# Patient Record
Sex: FEMALE | Race: BLACK OR AFRICAN AMERICAN | NOT HISPANIC OR LATINO | Employment: OTHER | URBAN - METROPOLITAN AREA
[De-identification: names, ages, dates, MRNs, and addresses within clinical notes are randomized per-mention and may not be internally consistent; named-entity substitution may affect disease eponyms.]

---

## 2017-06-28 ENCOUNTER — CONSULT (OUTPATIENT)
Dept: SLEEP MEDICINE | Facility: HOSPITAL | Age: 67
End: 2017-06-28

## 2017-06-28 VITALS
WEIGHT: 293 LBS | OXYGEN SATURATION: 93 % | SYSTOLIC BLOOD PRESSURE: 114 MMHG | HEIGHT: 65 IN | HEART RATE: 81 BPM | DIASTOLIC BLOOD PRESSURE: 70 MMHG | BODY MASS INDEX: 48.82 KG/M2

## 2017-06-28 DIAGNOSIS — G47.33 OBSTRUCTIVE SLEEP APNEA, ADULT: Primary | ICD-10-CM

## 2017-06-28 PROBLEM — E66.01 MORBID OBESITY DUE TO EXCESS CALORIES: Status: ACTIVE | Noted: 2017-06-28

## 2017-06-28 PROCEDURE — 99203 OFFICE O/P NEW LOW 30 MIN: CPT | Performed by: INTERNAL MEDICINE

## 2017-06-28 RX ORDER — FLUTICASONE PROPIONATE 50 MCG
SPRAY, SUSPENSION (ML) NASAL DAILY
COMMUNITY
Start: 2017-06-16 | End: 2021-09-24

## 2017-06-28 RX ORDER — LEVOTHYROXINE SODIUM 0.12 MG/1
TABLET ORAL DAILY
COMMUNITY
Start: 2017-03-27

## 2017-06-28 RX ORDER — MELOXICAM 7.5 MG/1
7.5 TABLET ORAL DAILY
COMMUNITY
End: 2018-07-26

## 2017-06-28 RX ORDER — FUROSEMIDE 40 MG/1
40 TABLET ORAL 2 TIMES DAILY
COMMUNITY

## 2017-06-28 RX ORDER — UBIDECARENONE 75 MG
CAPSULE ORAL DAILY
COMMUNITY
Start: 2017-04-01

## 2017-06-28 RX ORDER — GABAPENTIN 300 MG/1
300 CAPSULE ORAL 3 TIMES DAILY
COMMUNITY
End: 2018-07-26

## 2017-06-28 RX ORDER — BUMETANIDE 1 MG/1
TABLET ORAL DAILY
COMMUNITY
Start: 2017-05-23

## 2017-06-28 RX ORDER — PRAVASTATIN SODIUM 40 MG
40 TABLET ORAL DAILY
COMMUNITY
End: 2022-01-25

## 2017-06-28 NOTE — PROGRESS NOTES
Subjective   Romel Wilson is a 67 y.o. female is being seen for consultation today at the request of Lena Bush MD  for the evaluation of snoring and obstructive sleep apnea.    History of Present Illness  Patient has a history of being diagnosed with obstructive sleep apnea.  She been on CPAP but switched insurance this past October November.  Apparently her [employer] company picked up her CPAP machine because her current insurance was not paying for it.  She is referred now for further evaluation.  The patient states she's had snoring noted for at least 10 or 12 years.  She thinks she was diagnosed first in 2014 been on treatment since then.  She really had a last study in March 2016.  It showed severe obstructive sleep apnea with an elevated periodic limb movement index.    Patient states she will fall asleep if sitting quietly during the day.  She is often is sleeping sitting up since she no longer has her machine.  She history of loud snoring she is awakened with a dry mouth and gasping for breath.  She has awakened choking.  She has trouble breathing through her nose but denies ever breaking her nose.  She has morning headaches 3 days per week.  She denies any history of reflux symptoms she denies sleep paralysis.  She occasionally has auditory hallucinations when going to sleep.  She occasionally has kicking and jerking of her legs noted at night.  She that she is gained 10 pounds in the past year.    She goes to bed about 11:30 at night will fall asleep within 20 minutes.  She awakens 2-3 times during the night.  She gets about 6 hours of sleep but does not feel rested.  She denies any history of hypertension or diabetes.  She does have a history of thyroid problems.  She had a previous thyroidectomy.  She has history of arthritis.  No Known Allergies to medication she does have seasonal environmental allergies.       Current Outpatient Prescriptions:   •  bumetanide (BUMEX) 1 MG tablet, , Disp: , Rfl:   •   furosemide (LASIX) 40 MG tablet, Take 40 mg by mouth 2 (Two) Times a Day., Disp: , Rfl:   •  gabapentin (NEURONTIN) 300 MG capsule, Take 300 mg by mouth 3 (Three) Times a Day., Disp: , Rfl:   •  levothyroxine (SYNTHROID, LEVOTHROID) 125 MCG tablet, , Disp: , Rfl:   •  meloxicam (MOBIC) 7.5 MG tablet, Take 7.5 mg by mouth Daily., Disp: , Rfl:   •  pravastatin (PRAVACHOL) 40 MG tablet, Take 40 mg by mouth Daily., Disp: , Rfl:   •  vitamin B-12 (CYANOCOBALAMIN) 100 MCG tablet, , Disp: , Rfl:   •  fluticasone (FLONASE) 50 MCG/ACT nasal spray, , Disp: , Rfl:     Smoking status: Current Some Day Smoker                                                    Packs/day: 1.00      Years: 48.00      Smokeless status: Never Used                           History   Alcohol Use No     Comment: once a year       Caffeine: She has 1 cup of coffee per day in 3 yifan per week    Past Medical History:   Diagnosis Date   • Arthritis    • Disease of thyroid gland        Past Surgical History:   Procedure Laterality Date   • HYSTERECTOMY     • THYROIDECTOMY         Family History   Problem Relation Age of Onset   • Hypertension Mother        The following portions of the patient's history were reviewed and updated as appropriate: allergies, current medications, past family history, past medical history, past social history, past surgical history and problem list.    Review of Systems   Constitutional: Positive for fatigue and unexpected weight change.   HENT: Negative.    Eyes: Negative.    Respiratory: Positive for cough and shortness of breath.    Cardiovascular: Positive for leg swelling.   Gastrointestinal: Negative.    Endocrine: Positive for cold intolerance and heat intolerance.   Genitourinary: Negative.    Musculoskeletal: Positive for arthralgias, back pain, gait problem, joint swelling and myalgias.   Skin: Negative.    Allergic/Immunologic: Positive for environmental allergies.   Neurological: Negative for headaches.  "  Hematological: Negative.    Psychiatric/Behavioral: Negative.     [place] scores 13/24    Objective     /70  Pulse 81  Ht 65\" (165.1 cm)  Wt (!) 322 lb (146 kg)  SpO2 93%  BMI 53.58 kg/m2     Physical Exam   Constitutional: She is oriented to person, place, and time. She appears well-developed and well-nourished.   She is morbidly obese.   HENT:   Head: Normocephalic and atraumatic.   She has nasal airway narrowing and Mallampati class III anatomy   Eyes: EOM are normal. Pupils are equal, round, and reactive to light.   Neck: Normal range of motion. Neck supple.   Cardiovascular: Normal rate, regular rhythm and normal heart sounds.    Pulmonary/Chest: Effort normal and breath sounds normal.   Abdominal: Soft. Bowel sounds are normal.   Musculoskeletal: Normal range of motion. She exhibits edema.   She has trace pedal edema   Neurological: She is alert and oriented to person, place, and time.   Skin: Skin is warm and dry.   Psychiatric: She has a normal mood and affect. Her behavior is normal.    patient's sleep study at [facility] March 2016 showed an AHI of 30.7.  She had oxygen desaturation to 78% and spent 4.35% of her time in bed in desaturated state.  She had an elevated periodic limb movement arousal index of 15.9.      Assessment/Plan   Romel was seen today for sleeping problem.    Diagnoses and all orders for this visit:    Obstructive sleep apnea, adult  -     Detailed AutoPAP Order     periodic limb movement disorder    Patient.  She does have severe obstructive sleep apnea.  We've discussed possible therapies including CPAP weight loss oral appliances and surgery.  We will order CPAP with an AutoSet of 8 cm minimum an 18 cm maximum.  We will see her back in 2 months and be certain were correcting her respiratory events.  We may need to consider medical therapy for periodic limb movements if it seems be bothering her although she is already on gabapentin which may well be be effective.  " She is encouraged to lose weight.  She is encouraged practice lateral position sleep.  She is encouraged to avoid alcohol and sedatives close to bedtime.         Clyde Mccollum MD [facility]  Sleep Medicine  Pulmonary and Critical Care Medicine

## 2017-06-28 NOTE — PATIENT INSTRUCTIONS
Sleep Apnea  Sleep apnea is a condition in which breathing pauses or becomes shallow during sleep. Episodes of sleep apnea usually last 10 seconds or longer, and they may occur as many as 20 times an hour. Sleep apnea disrupts your sleep and keeps your body from getting the rest that it needs. This condition can increase your risk of certain health problems, including:  · Heart attack.  · Stroke.  · Obesity.  · Diabetes.  · Heart failure.  · Irregular heartbeat.  There are three kinds of sleep apnea:  · Obstructive sleep apnea. This kind is caused by a blocked or collapsed airway.  · Central sleep apnea. This kind happens when the part of the brain that controls breathing does not send the correct signals to the muscles that control breathing.  · Mixed sleep apnea. This is a combination of obstructive and central sleep apnea.  CAUSES  The most common cause of this condition is a collapsed or blocked airway. An airway can collapse or become blocked if:  · Your throat muscles are abnormally relaxed.  · Your tongue and tonsils are larger than normal.  · You are overweight.  · Your airway is smaller than normal.  RISK FACTORS  This condition is more likely to develop in people who:  · Are overweight.  · Smoke.  · Have a smaller than normal airway.  · Are elderly.  · Are male.  · Drink alcohol.  · Take sedatives or tranquilizers.  · Have a family history of sleep apnea.  SYMPTOMS  Symptoms of this condition include:  · Trouble staying asleep.  · Daytime sleepiness and tiredness.  · Irritability.  · Loud snoring.  · Morning headaches.  · Trouble concentrating.  · Forgetfulness.  · Decreased interest in sex.  · Unexplained sleepiness.  · Mood swings.  · Personality changes.  · Feelings of depression.  · Waking up often during the night to urinate.  · Dry mouth.  · Sore throat.  DIAGNOSIS  This condition may be diagnosed with:  · A medical history.  · A physical exam.  · A series of tests that are done while you are  sleeping (sleep study). These tests are usually done in a sleep lab, but they may also be done at home.  TREATMENT  Treatment for this condition aims to restore normal breathing and to ease symptoms during sleep. It may involve managing health issues that can affect breathing, such as high blood pressure or obesity. Treatment may include:  · Sleeping on your side.  · Using a decongestant if you have nasal congestion.  · Avoiding the use of depressants, including alcohol, sedatives, and narcotics.  · Losing weight if you are overweight.  · Making changes to your diet.  · Quitting smoking.  · Using a device to open your airway while you sleep, such as:    An oral appliance. This is a custom-made mouthpiece that shifts your lower jaw forward.    A continuous positive airway pressure (CPAP) device. This device delivers oxygen to your airway through a mask.    A nasal expiratory positive airway pressure (EPAP) device. This device has valves that you put into each nostril.    A bi-level positive airway pressure (BPAP) device. This device delivers oxygen to your airway through a mask.  · Surgery if other treatments do not work. During surgery, excess tissue is removed to create a wider airway.  It is important to get treatment for sleep apnea. Without treatment, this condition can lead to:  · High blood pressure.  · Coronary artery disease.  · (Men) An inability to achieve or maintain an erection (impotence).  · Reduced thinking abilities.  HOME CARE INSTRUCTIONS  · Make any lifestyle changes that your health care provider recommends.  · Eat a healthy, well-balanced diet.  · Take over-the-counter and prescription medicines only as told by your health care provider.  · Avoid using depressants, including alcohol, sedatives, and narcotics.  · Take steps to lose weight if you are overweight.  · If you were given a device to open your airway while you sleep, use it only as told by your health care provider.  · Do not use any  tobacco products, such as cigarettes, chewing tobacco, and e-cigarettes. If you need help quitting, ask your health care provider.  · Keep all follow-up visits as told by your health care provider. This is important.  SEEK MEDICAL CARE IF:  · The device that you received to open your airway during sleep is uncomfortable or does not seem to be working.  · Your symptoms do not improve.  · Your symptoms get worse.  SEEK IMMEDIATE MEDICAL CARE IF:  · You develop chest pain.  · You develop shortness of breath.  · You develop discomfort in your back, arms, or stomach.  · You have trouble speaking.  · You have weakness on one side of your body.  · You have drooping in your face.  These symptoms may represent a serious problem that is an emergency. Do not wait to see if the symptoms will go away. Get medical help right away. Call your local emergency services (911 in the [place] Do not drive yourself to the hospital.     This information is not intended to replace advice given to you by your health care provider. Make sure you discuss any questions you have with your health care provider.     Document Released: 12/08/2003 Document Revised: 04/10/2017 Document Reviewed: 09/26/2016  [employer] Interactive Patient Education ©2017 [employer]

## 2017-08-07 ENCOUNTER — TELEPHONE (OUTPATIENT)
Dept: SLEEP MEDICINE | Facility: HOSPITAL | Age: 67
End: 2017-08-07

## 2017-08-07 NOTE — TELEPHONE ENCOUNTER
Called patient to re schedule her compliance follow up. It needs to be rescheduled 31-90 days after 8/4/2017

## 2017-09-29 ENCOUNTER — OFFICE VISIT (OUTPATIENT)
Dept: SLEEP MEDICINE | Facility: HOSPITAL | Age: 67
End: 2017-09-29

## 2017-09-29 VITALS
HEART RATE: 66 BPM | HEIGHT: 65 IN | BODY MASS INDEX: 48.82 KG/M2 | OXYGEN SATURATION: 97 % | WEIGHT: 293 LBS | SYSTOLIC BLOOD PRESSURE: 130 MMHG | DIASTOLIC BLOOD PRESSURE: 64 MMHG

## 2017-09-29 DIAGNOSIS — G47.33 OBSTRUCTIVE SLEEP APNEA, ADULT: Primary | ICD-10-CM

## 2017-09-29 PROCEDURE — 99213 OFFICE O/P EST LOW 20 MIN: CPT | Performed by: INTERNAL MEDICINE

## 2017-09-29 NOTE — PROGRESS NOTES
Subjective   Romel Wilson is a 67 y.o. female is here today for follow-up.  For severe obstructive sleep apnea and periodic limb and disorder.  Her primary care physician is Dr. Lena Bush    History of Present Illness  Patient was last seen in this clinic June 28, 2017.  She had severe obstructive sleep apnea seen on a previous sleep study with an AHI of 30.7.  Her periodic limb movement arousal index was also 15.9.  She is been on gabapentin.  She has a history of morbid obesity and hypothyroidism.  She was placed on new CPAP machine last she's doing well with that.  She says sometimes she sleeps better than others but thinks may be sleeping somewhat better overall is less tired during the day.  She says her gabapentin has been increased in her legs seem to be bothering her less.  Past Medical History:   Diagnosis Date   • Arthritis    • Disease of thyroid gland        Past Surgical History:   Procedure Laterality Date   • HYSTERECTOMY     • THYROIDECTOMY             Current Outpatient Prescriptions:   •  bumetanide (BUMEX) 1 MG tablet, , Disp: , Rfl:   •  fluticasone (FLONASE) 50 MCG/ACT nasal spray, , Disp: , Rfl:   •  furosemide (LASIX) 40 MG tablet, Take 40 mg by mouth 2 (Two) Times a Day., Disp: , Rfl:   •  gabapentin (NEURONTIN) 300 MG capsule, Take 300 mg by mouth 3 (Three) Times a Day., Disp: , Rfl:   •  levothyroxine (SYNTHROID, LEVOTHROID) 125 MCG tablet, , Disp: , Rfl:   •  meloxicam (MOBIC) 7.5 MG tablet, Take 7.5 mg by mouth Daily., Disp: , Rfl:   •  pravastatin (PRAVACHOL) 40 MG tablet, Take 40 mg by mouth Daily., Disp: , Rfl:   •  vitamin B-12 (CYANOCOBALAMIN) 100 MCG tablet, , Disp: , Rfl:     No Known Allergies    The following portions of the patient's history were reviewed and updated as appropriate: allergies, current medications and problem list.    Review of Systems   Constitutional: Negative.    HENT: Positive for congestion and postnasal drip.    Eyes: Negative.    Respiratory: Positive  "for cough and wheezing.    Cardiovascular: Negative.    Gastrointestinal: Negative.    Endocrine: Negative.    Genitourinary: Negative.    Musculoskeletal: Positive for arthralgias, back pain and joint swelling.   Skin: Positive for rash.   Allergic/Immunologic: Negative.    Neurological: Positive for headaches.   Hematological: Negative.    Psychiatric/Behavioral: Negative.    [place] scores 11/24    Objective     /64  Pulse 66  Ht 65\" (165.1 cm)  Wt (!) 328 lb (149 kg)  SpO2 97%  BMI 54.58 kg/m2    Physical Exam   Constitutional: She is oriented to person, place, and time. She appears well-developed and well-nourished.   She is morbidly obese.   HENT:   Head: Normocephalic and atraumatic.   She has Mallampati class IV anatomy   Eyes: EOM are normal. Pupils are equal, round, and reactive to light.   Neck: Normal range of motion. Neck supple.   Cardiovascular: Normal rate, regular rhythm and normal heart sounds.    Pulmonary/Chest: Effort normal and breath sounds normal.   Abdominal: Soft. Bowel sounds are normal.   Musculoskeletal: Normal range of motion. She exhibits edema.   She has trace pedal edema   Neurological: She is alert and oriented to person, place, and time.   Skin: Skin is warm and dry.   Psychiatric: She has a normal mood and affect. Her behavior is normal.    download from her machine for the past 55 days shows usage 98.2% the days.  She's using it 6 hours 54 minutes per day.  Her 90% pressure is 10.3.  Her AHI is normal at 4.7.      Assessment/Plan   Romel was seen today for follow-up.    Diagnoses and all orders for this visit:    Obstructive sleep apnea, adult  -     CPAP Therapy    Patient does have severe obstructive sleep apnea.  She has good control of her respiratory events on her current pressure settings.  She wishes to continue with the CPAP.  We will renew her supplies.  She is encouraged to lose weight.  She is encouraged to avoid alcohol and sedatives close to bedtime.  She " is encouraged practice lateral position sleep.  We will plan to see her back in 1 year.  She is to contact us earlier symptoms worsen             Clyde Mccollum MD [facility]  Sleep Medicine  Pulmonary and Critical Care Medicine      09/29/17  8:51 AM

## 2017-09-29 NOTE — PATIENT INSTRUCTIONS
Sleep Apnea  Sleep apnea is a condition in which breathing pauses or becomes shallow during sleep. Episodes of sleep apnea usually last 10 seconds or longer, and they may occur as many as 20 times an hour. Sleep apnea disrupts your sleep and keeps your body from getting the rest that it needs. This condition can increase your risk of certain health problems, including:  · Heart attack.  · Stroke.  · Obesity.  · Diabetes.  · Heart failure.  · Irregular heartbeat.  There are three kinds of sleep apnea:  · Obstructive sleep apnea. This kind is caused by a blocked or collapsed airway.  · Central sleep apnea. This kind happens when the part of the brain that controls breathing does not send the correct signals to the muscles that control breathing.  · Mixed sleep apnea. This is a combination of obstructive and central sleep apnea.  CAUSES  The most common cause of this condition is a collapsed or blocked airway. An airway can collapse or become blocked if:  · Your throat muscles are abnormally relaxed.  · Your tongue and tonsils are larger than normal.  · You are overweight.  · Your airway is smaller than normal.  RISK FACTORS  This condition is more likely to develop in people who:  · Are overweight.  · Smoke.  · Have a smaller than normal airway.  · Are elderly.  · Are male.  · Drink alcohol.  · Take sedatives or tranquilizers.  · Have a family history of sleep apnea.  SYMPTOMS  Symptoms of this condition include:  · Trouble staying asleep.  · Daytime sleepiness and tiredness.  · Irritability.  · Loud snoring.  · Morning headaches.  · Trouble concentrating.  · Forgetfulness.  · Decreased interest in sex.  · Unexplained sleepiness.  · Mood swings.  · Personality changes.  · Feelings of depression.  · Waking up often during the night to urinate.  · Dry mouth.  · Sore throat.  DIAGNOSIS  This condition may be diagnosed with:  · A medical history.  · A physical exam.  · A series of tests that are done while you are  sleeping (sleep study). These tests are usually done in a sleep lab, but they may also be done at home.  TREATMENT  Treatment for this condition aims to restore normal breathing and to ease symptoms during sleep. It may involve managing health issues that can affect breathing, such as high blood pressure or obesity. Treatment may include:  · Sleeping on your side.  · Using a decongestant if you have nasal congestion.  · Avoiding the use of depressants, including alcohol, sedatives, and narcotics.  · Losing weight if you are overweight.  · Making changes to your diet.  · Quitting smoking.  · Using a device to open your airway while you sleep, such as:    An oral appliance. This is a custom-made mouthpiece that shifts your lower jaw forward.    A continuous positive airway pressure (CPAP) device. This device delivers oxygen to your airway through a mask.    A nasal expiratory positive airway pressure (EPAP) device. This device has valves that you put into each nostril.    A bi-level positive airway pressure (BPAP) device. This device delivers oxygen to your airway through a mask.  · Surgery if other treatments do not work. During surgery, excess tissue is removed to create a wider airway.  It is important to get treatment for sleep apnea. Without treatment, this condition can lead to:  · High blood pressure.  · Coronary artery disease.  · (Men) An inability to achieve or maintain an erection (impotence).  · Reduced thinking abilities.  HOME CARE INSTRUCTIONS  · Make any lifestyle changes that your health care provider recommends.  · Eat a healthy, well-balanced diet.  · Take over-the-counter and prescription medicines only as told by your health care provider.  · Avoid using depressants, including alcohol, sedatives, and narcotics.  · Take steps to lose weight if you are overweight.  · If you were given a device to open your airway while you sleep, use it only as told by your health care provider.  · Do not use any  tobacco products, such as cigarettes, chewing tobacco, and e-cigarettes. If you need help quitting, ask your health care provider.  · Keep all follow-up visits as told by your health care provider. This is important.  SEEK MEDICAL CARE IF:  · The device that you received to open your airway during sleep is uncomfortable or does not seem to be working.  · Your symptoms do not improve.  · Your symptoms get worse.  SEEK IMMEDIATE MEDICAL CARE IF:  · You develop chest pain.  · You develop shortness of breath.  · You develop discomfort in your back, arms, or stomach.  · You have trouble speaking.  · You have weakness on one side of your body.  · You have drooping in your face.  These symptoms may represent a serious problem that is an emergency. Do not wait to see if the symptoms will go away. Get medical help right away. Call your local emergency services (911 in the [place] Do not drive yourself to the hospital.     This information is not intended to replace advice given to you by your health care provider. Make sure you discuss any questions you have with your health care provider.     Document Released: 12/08/2003 Document Revised: 04/10/2017 Document Reviewed: 09/26/2016  [employer] Interactive Patient Education ©2017 [employer]

## 2018-07-26 ENCOUNTER — OFFICE VISIT (OUTPATIENT)
Dept: SLEEP MEDICINE | Facility: HOSPITAL | Age: 68
End: 2018-07-26

## 2018-07-26 VITALS
HEART RATE: 61 BPM | WEIGHT: 293 LBS | HEIGHT: 65 IN | BODY MASS INDEX: 48.82 KG/M2 | DIASTOLIC BLOOD PRESSURE: 63 MMHG | OXYGEN SATURATION: 92 % | SYSTOLIC BLOOD PRESSURE: 105 MMHG

## 2018-07-26 DIAGNOSIS — E66.01 MORBID OBESITY DUE TO EXCESS CALORIES (HCC): ICD-10-CM

## 2018-07-26 DIAGNOSIS — G47.33 OSA (OBSTRUCTIVE SLEEP APNEA): Primary | ICD-10-CM

## 2018-07-26 PROCEDURE — 99213 OFFICE O/P EST LOW 20 MIN: CPT | Performed by: NURSE PRACTITIONER

## 2018-07-26 NOTE — PROGRESS NOTES
Subjective: Follow-up        Chief Complaint:   Chief Complaint   Patient presents with   • Follow-up       HPI:    Romel Wilson is a 68 y.o. female here for follow-up of alan  Patient [place]  Score shows 19/24 states she is still very sleepy during the day and does like to take a nap intermittently she does feel refreshed after awakening.  She has not had any success with weight loss.  When she wakes up in the night she finds that her mask is in the bed she has taken it off during the night and does not know that she did so.  She has also had some problems with the mask leaking.      Current medications are:   Current Outpatient Prescriptions:   •  bumetanide (BUMEX) 1 MG tablet, , Disp: , Rfl:   •  fluticasone (FLONASE) 50 MCG/ACT nasal spray, , Disp: , Rfl:   •  furosemide (LASIX) 40 MG tablet, Take 40 mg by mouth 2 (Two) Times a Day., Disp: , Rfl:   •  levothyroxine (SYNTHROID, LEVOTHROID) 125 MCG tablet, , Disp: , Rfl:   •  pravastatin (PRAVACHOL) 40 MG tablet, Take 40 mg by mouth Daily., Disp: , Rfl:   •  vitamin B-12 (CYANOCOBALAMIN) 100 MCG tablet, , Disp: , Rfl: .      The patient's relevant past medical, surgical, family and social history were reviewed and updated in Epic as appropriate.       Review of Systems   Constitutional: Positive for fatigue.   Respiratory: Positive for apnea and shortness of breath.    Cardiovascular: Positive for chest pain and leg swelling.   Allergic/Immunologic: Positive for environmental allergies.   Psychiatric/Behavioral: Positive for sleep disturbance. The patient is nervous/anxious.    All other systems reviewed and are negative.        Objective:    Physical Exam   Constitutional: She is oriented to person, place, and time. She appears well-developed and well-nourished.   Obese female   HENT:   Head: Normocephalic and atraumatic.   Mouth/Throat: Oropharynx is clear and moist.   Mallampati stage IV anatomy   Eyes: Conjunctivae are normal.   Neck: Neck supple. No  thyromegaly present.   Cardiovascular: Normal rate.    Pulmonary/Chest: Effort normal and breath sounds normal.   Musculoskeletal:   Ambulating with rolling walker   Lymphadenopathy:     She has no cervical adenopathy.   Neurological: She is alert and oriented to person, place, and time.   Skin: Skin is warm and dry.   Psychiatric: She has a normal mood and affect. Her behavior is normal. Judgment and thought content normal.   Nursing note and vitals reviewed.    Usage greater than 4 hours 76.7%.  Greater than 90% of the time pressure 10.8.  AHI is 4.8.    ASSESSMENT/PLAN    Romel was seen today for follow-up.    Diagnoses and all orders for this visit:    NANCY (obstructive sleep apnea)  -     CPAP Therapy    Morbid obesity due to excess calories (CMS/[facility])            1. Counseled patient regarding multimodal approach with healthy nutrition, healthy sleep, regular physical activity, social activities, counseling, and medications. Encouraged to practice lateral sleep position. Avoid alcohol and sedatives close to bedtime.  2. Refill supplies.  3. She is going to her [employer] company to be refitted for another mask.  I feel this is a good idea and she will possibility of a better fit with a different mask.  Return to clinic if she does not found that she is at least 90% better.  Otherwise we will see her in 6 months to 1 year.    I have reviewed the results of my evaluation and impression and discussed my recommendations in detail with the patient.      Signed by  KATELYNN Caraballo    July 27, 2018      CC: Lena Bush MD          No ref. provider found

## 2018-07-26 NOTE — PATIENT INSTRUCTIONS
Obesity, Adult  Obesity is the condition of having too much total body fat. Being overweight or obese means that your weight is greater than what is considered healthy for your body size. Obesity is determined by a measurement called BMI. BMI is an estimate of body fat and is calculated from height and weight. For adults, a BMI of 30 or higher is considered obese.  Obesity can eventually lead to other health concerns and major illnesses, including:  · Stroke.  · Coronary artery disease (CAD).  · Type 2 diabetes.  · Some types of cancer, including cancers of the colon, breast, uterus, and gallbladder.  · Osteoarthritis.  · High blood pressure (hypertension).  · High cholesterol.  · Sleep apnea.  · Gallbladder stones.  · Infertility problems.    What are the causes?  The main cause of obesity is taking in (consuming) more calories than your body uses for energy. Other factors that contribute to this condition may include:  · Being born with genes that make you more likely to become obese.  · Having a medical condition that causes obesity. These conditions include:  ? Hypothyroidism.  ? Polycystic ovarian syndrome (PCOS).  ? Binge-eating disorder.  ? Cushing syndrome.  · Taking certain medicines, such as steroids, antidepressants, and seizure medicines.  · Not being physically active (sedentary lifestyle).  · Living where there are limited places to exercise safely or buy healthy foods.  · Not getting enough sleep.    What increases the risk?  The following factors may increase your risk of this condition:  · Having a family history of obesity.  · Being a woman of -American descent.  · Being a man of  descent.    What are the signs or symptoms?  Having excessive body fat is the main symptom of this condition.  How is this diagnosed?  This condition may be diagnosed based on:  · Your symptoms.  · Your medical history.  · A physical exam. Your health care provider may measure:  ? Your BMI. If you are an  adult with a BMI between 25 and less than 30, you are considered overweight. If you are an adult with a BMI of 30 or higher, you are considered obese.  ? The distances around your hips and your waist (circumferences). These may be compared to each other to help diagnose your condition.  ? Your skinfold thickness. Your health care provider may gently pinch a fold of your skin and measure it.    How is this treated?  Treatment for this condition often includes changing your lifestyle. Treatment may include some or all of the following:  · Dietary changes. Work with your health care provider and a dietitian to set a weight-loss goal that is healthy and reasonable for you. Dietary changes may include eating:  ? Smaller portions. A portion size is the amount of a particular food that is healthy for you to eat at one time. This varies from person to person.  ? Low-calorie or low-fat options.  ? More whole grains, fruits, and vegetables.  · Regular physical activity. This may include aerobic activity (cardio) and strength training.  · Medicine to help you lose weight. Your health care provider may prescribe medicine if you are unable to lose 1 pound a week after 6 weeks of eating more healthily and doing more physical activity.  · Surgery. Surgical options may include gastric banding and gastric bypass. Surgery may be done if:  ? Other treatments have not helped to improve your condition.  ? You have a BMI of 40 or higher.  ? You have life-threatening health problems related to obesity.    Follow these instructions at home:    Eating and drinking    · Follow recommendations from your health care provider about what you eat and drink. Your health care provider may advise you to:  ? Limit fast foods, sweets, and processed snack foods.  ? Choose low-fat options, such as low-fat milk instead of whole milk.  ? Eat 5 or more servings of fruits or vegetables every day.  ? Eat at home more often. This gives you more control over  what you eat.  ? Choose healthy foods when you eat out.  ? Learn what a healthy portion size is.  ? Keep low-fat snacks on hand.  ? Avoid sugary drinks, such as soda, fruit juice, iced tea sweetened with sugar, and flavored milk.  ? Eat a healthy breakfast.  · Drink enough water to keep your urine clear or pale yellow.  · Do not go without eating for long periods of time (do not fast) or follow a fad diet. Fasting and fad diets can be unhealthy and even dangerous.  Physical Activity  · Exercise regularly, as told by your health care provider. Ask your health care provider what types of exercise are safe for you and how often you should exercise.  · Warm up and stretch before being active.  · Cool down and stretch after being active.  · Rest between periods of activity.  Lifestyle  · Limit the time that you spend in front of your TV, computer, or video game system.  · Find ways to reward yourself that do not involve food.  · Limit alcohol intake to no more than 1 drink a day for nonpregnant women and 2 drinks a day for men. One drink equals 12 oz of beer, 5 oz of wine, or 1½ oz of hard liquor.  General instructions  · Keep a weight loss journal to keep track of the food you eat and how much you exercise you get.  · Take over-the-counter and prescription medicines only as told by your health care provider.  · Take vitamins and supplements only as told by your health care provider.  · Consider joining a support group. Your health care provider may be able to recommend a support group.  · Keep all follow-up visits as told by your health care provider. This is important.  Contact a health care provider if:  · You are unable to meet your weight loss goal after 6 weeks of dietary and lifestyle changes.  This information is not intended to replace advice given to you by your health care provider. Make sure you discuss any questions you have with your health care provider.  Document Released: 01/25/2006 Document Revised:  05/22/2017 Document Reviewed: 10/05/2016  [employer] Interactive Patient Education © 2018 [employer]  Sleep Apnea  Sleep apnea is a condition that affects breathing. People with sleep apnea have moments during sleep when their breathing pauses briefly or gets shallow. Sleep apnea can cause these symptoms:  · Trouble staying asleep.  · Sleepiness or tiredness during the day.  · Irritability.  · Loud snoring.  · Morning headaches.  · Trouble concentrating.  · Forgetting things.  · Less interest in sex.  · Being sleepy for no reason.  · Mood swings.  · Personality changes.  · Depression.  · Waking up a lot during the night to pee (urinate).  · Dry mouth.  · Sore throat.    Follow these instructions at home:  · Make any changes in your routine that your doctor recommends.  · Eat a healthy, well-balanced diet.  · Take over-the-counter and prescription medicines only as told by your doctor.  · Avoid using alcohol, calming medicines (sedatives), and narcotic medicines.  · Take steps to lose weight if you are overweight.  · If you were given a machine (device) to use while you sleep, use it only as told by your doctor.  · Do not use any tobacco products, such as cigarettes, chewing tobacco, and e-cigarettes. If you need help quitting, ask your doctor.  · Keep all follow-up visits as told by your doctor. This is important.  Contact a doctor if:  · The machine that you were given to use during sleep is uncomfortable or does not seem to be working.  · Your symptoms do not get better.  · Your symptoms get worse.  Get help right away if:  · Your chest hurts.  · You have trouble breathing in enough air (shortness of breath).  · You have an uncomfortable feeling in your back, arms, or stomach.  · You have trouble talking.  · One side of your body feels weak.  · A part of your face is hanging down (drooping).  These symptoms may be an emergency. Do not wait to see if the symptoms will go away. Get medical help right away. Call  your local emergency services (911 in the [place] Do not drive yourself to the hospital.  This information is not intended to replace advice given to you by your health care provider. Make sure you discuss any questions you have with your health care provider.  Document Released: 09/26/2009 Document Revised: 08/13/2017 Document Reviewed: 09/26/2016  [employer] Interactive Patient Education © 2018 [employer]

## 2019-07-29 ENCOUNTER — OFFICE VISIT (OUTPATIENT)
Dept: SLEEP MEDICINE | Facility: HOSPITAL | Age: 69
End: 2019-07-29

## 2019-07-29 VITALS
HEIGHT: 65 IN | BODY MASS INDEX: 48.82 KG/M2 | OXYGEN SATURATION: 91 % | WEIGHT: 293 LBS | HEART RATE: 55 BPM | SYSTOLIC BLOOD PRESSURE: 125 MMHG | DIASTOLIC BLOOD PRESSURE: 71 MMHG

## 2019-07-29 DIAGNOSIS — G47.33 OBSTRUCTIVE SLEEP APNEA, ADULT: Primary | ICD-10-CM

## 2019-07-29 PROCEDURE — 99213 OFFICE O/P EST LOW 20 MIN: CPT | Performed by: NURSE PRACTITIONER

## 2019-07-29 NOTE — PROGRESS NOTES
"    Chief Complaint:   Chief Complaint   Patient presents with   • Follow-up       HPI:    Romel Wilson is a 69 y.o. female here for follow-up of sleep apnea.  Patient was last seen 7/26/2018 for severe obstructive sleep apnea.  On that visit patient did not feel she was doing well.  Patient is sleeping 6 to 7 hours nightly and does not feel refreshed upon awakening.  Patient states she can go back to sleep as soon as she gets up for the morning.  Patient has an [place] score of 16/24.  Patient states \"I feel no different than I ever have.\"  Patient is willing to try an increase setting to see if this will correct her sleepiness as well as her apnea.        Current medications are:   Current Outpatient Medications:   •  bumetanide (BUMEX) 1 MG tablet, , Disp: , Rfl:   •  fluticasone (FLONASE) 50 MCG/ACT nasal spray, , Disp: , Rfl:   •  furosemide (LASIX) 40 MG tablet, Take 40 mg by mouth 2 (Two) Times a Day., Disp: , Rfl:   •  levothyroxine (SYNTHROID, LEVOTHROID) 125 MCG tablet, , Disp: , Rfl:   •  pravastatin (PRAVACHOL) 40 MG tablet, Take 40 mg by mouth Daily., Disp: , Rfl:   •  vitamin B-12 (CYANOCOBALAMIN) 100 MCG tablet, , Disp: , Rfl: .      The patient's relevant past medical, surgical, family and social history were reviewed and updated in Epic as appropriate.       Review of Systems   Constitutional: Positive for fatigue.   Respiratory: Positive for apnea, cough, choking and shortness of breath.    Cardiovascular: Positive for chest pain and leg swelling.   Musculoskeletal: Positive for arthralgias, gait problem, joint swelling and myalgias.   Allergic/Immunologic: Positive for environmental allergies.   Psychiatric/Behavioral: Positive for sleep disturbance. The patient is nervous/anxious.    All other systems reviewed and are negative.        Objective:    Physical Exam   Constitutional: She is oriented to person, place, and time. She appears well-developed and well-nourished.   HENT:   Head: " Normocephalic and atraumatic.   Mouth/Throat: Oropharynx is clear and moist.   Mallampati 4 anatomy   Eyes: Conjunctivae are normal.   Neck: Neck supple. No thyromegaly present.   Cardiovascular: Normal rate and regular rhythm.   Pulmonary/Chest: Effort normal. She has wheezes in the left upper field.   Lymphadenopathy:     She has no cervical adenopathy.   Neurological: She is alert and oriented to person, place, and time.   Skin: Skin is warm and dry.   Psychiatric: She has a normal mood and affect. Her behavior is normal. Judgment and thought content normal.   Nursing note and vitals reviewed.    Greater than 4-hour use 91.7%.  90% pressure 11.2.  AHI of 6.4.  Days of use 177/180.  Download reviewed with patient.  ASSESSMENT/PLAN    Romel was seen today for follow-up.    Diagnoses and all orders for this visit:    Obstructive sleep apnea, adult  -     CPAP Therapy            1. Counseled patient regarding multimodal approach with healthy nutrition, healthy sleep, regular physical activity, social activities, counseling, and medications. Encouraged to practice lateral sleep position. Avoid alcohol and sedatives close to bedtime.  2. Refill supplies x1 year.  Have changed patient's pressure to 11-20 this is up from 8-18 due to 90% pressure, hypersomnolence, and increased AHI.  I will see patient back in 8 weeks.    I have reviewed the results of my evaluation and impression and discussed my recommendations in detail with the patient.      Signed by  KATELYNN Caraballo    July 29, 2019      CC: Lena Bush MD          No ref. provider found

## 2019-09-23 ENCOUNTER — OFFICE VISIT (OUTPATIENT)
Dept: SLEEP MEDICINE | Facility: HOSPITAL | Age: 69
End: 2019-09-23

## 2019-09-23 VITALS
OXYGEN SATURATION: 95 % | BODY MASS INDEX: 48.82 KG/M2 | HEIGHT: 65 IN | DIASTOLIC BLOOD PRESSURE: 54 MMHG | HEART RATE: 56 BPM | WEIGHT: 293 LBS | SYSTOLIC BLOOD PRESSURE: 114 MMHG

## 2019-09-23 DIAGNOSIS — G47.33 OBSTRUCTIVE SLEEP APNEA, ADULT: Primary | ICD-10-CM

## 2019-09-23 DIAGNOSIS — G47.30 HYPERSOMNIA WITH SLEEP APNEA: ICD-10-CM

## 2019-09-23 DIAGNOSIS — G47.10 HYPERSOMNIA WITH SLEEP APNEA: ICD-10-CM

## 2019-09-23 PROCEDURE — 99212 OFFICE O/P EST SF 10 MIN: CPT | Performed by: NURSE PRACTITIONER

## 2019-09-23 NOTE — PROGRESS NOTES
Chief Complaint:   Chief Complaint   Patient presents with   • Follow-up       HPI:    Romel Wilson is a 69 y.o. female here for follow-up of sleep apnea.  On patient's last visit 7/29/2019 she was not feeling any better while wearing CPAP and her AHI was mildly elevated.  I did increase her pressures to 11-20.  She is in complete compliance with CPAP and AHI is within normal limits.  Patient does still feel sleepy during the day.  Patient sleeps 5 hours nightly and is still tired upon awakening.  Patient does nap during the day.  Patient has an [place] score of 10/24.  We have discussed medications to help with this feeling of tiredness and patient declines.        Current medications are:   Current Outpatient Medications:   •  bumetanide (BUMEX) 1 MG tablet, , Disp: , Rfl:   •  fluticasone (FLONASE) 50 MCG/ACT nasal spray, , Disp: , Rfl:   •  furosemide (LASIX) 40 MG tablet, Take 40 mg by mouth 2 (Two) Times a Day., Disp: , Rfl:   •  levothyroxine (SYNTHROID, LEVOTHROID) 125 MCG tablet, , Disp: , Rfl:   •  pravastatin (PRAVACHOL) 40 MG tablet, Take 40 mg by mouth Daily., Disp: , Rfl:   •  vitamin B-12 (CYANOCOBALAMIN) 100 MCG tablet, , Disp: , Rfl: .      The patient's relevant past medical, surgical, family and social history were reviewed and updated in Epic as appropriate.       Review of Systems   Constitutional: Positive for fatigue.   Eyes: Positive for visual disturbance.   Respiratory: Positive for apnea, cough and choking.    Cardiovascular: Positive for leg swelling.   Musculoskeletal: Positive for arthralgias, back pain, gait problem, joint swelling and myalgias.   Allergic/Immunologic: Positive for environmental allergies.   Psychiatric/Behavioral: Positive for sleep disturbance. The patient is nervous/anxious.    All other systems reviewed and are negative.        Objective:    Physical Exam   Constitutional: She is oriented to person, place, and time. She appears well-developed and well-nourished.    HENT:   Head: Normocephalic and atraumatic.   Mouth/Throat: Oropharynx is clear and moist.   Class 4 airway   Eyes: Conjunctivae are normal.   Neck: Neck supple. No thyromegaly present.   Cardiovascular: Normal rate and regular rhythm.   Pulmonary/Chest: Effort normal and breath sounds normal.   Lymphadenopathy:     She has no cervical adenopathy.   Neurological: She is alert and oriented to person, place, and time.   Skin: Skin is warm and dry.   Psychiatric: She has a normal mood and affect. Her behavior is normal. Judgment and thought content normal.   Nursing note and vitals reviewed.  60/60 days of use.  Greater than 4-hour use 100%.  90% pressure 12.6.  AHI 4.3.  Download reviewed with patient.      ASSESSMENT/PLAN    Romel was seen today for follow-up.    Diagnoses and all orders for this visit:    Obstructive sleep apnea, adult  -     CPAP Therapy    Hypersomnia with sleep apnea            1. Counseled patient regarding multimodal approach with healthy nutrition, healthy sleep, regular physical activity, social activities, counseling, and medications. Encouraged to practice lateral  sleep position. Avoid alcohol and sedatives close to bedtime.  2. Patient wishes to continue with CPAP therapy.  I have advised patient should she change her mind at any time regarding stimulant therapy she may call the office and let me know.    I have reviewed the results of my evaluation and impression and discussed my recommendations in detail with the patient.      Signed by  KATELYNN Caraballo    September 23, 2019      CC: Lena Bush MD          No ref. provider found

## 2019-10-31 ENCOUNTER — TELEPHONE (OUTPATIENT)
Dept: SLEEP MEDICINE | Facility: HOSPITAL | Age: 69
End: 2019-10-31

## 2019-10-31 DIAGNOSIS — G47.33 OSA (OBSTRUCTIVE SLEEP APNEA): Primary | ICD-10-CM

## 2019-10-31 NOTE — TELEPHONE ENCOUNTER
Patient states that she is waking up with a dry mouth and feels that her pressure is too high. Could her pressure be adjusted? Compliance is in chart

## 2019-10-31 NOTE — TELEPHONE ENCOUNTER
CALLED PATIENT TO ADVISE PRESSURE HAS BEEN LOWERED AND THAT SHE CAN ADJUST HUMIDIFIER LEVEL ON MACHINE. PATIENT VERBALIZED UNDERSTANDING PATIENT IS SCHEDULED FOR FU 10/31/19 TR

## 2019-10-31 NOTE — TELEPHONE ENCOUNTER
She can increase humidity setting to help with dryness. I have lowered pressure, want to see her in 4-5 weeks/

## 2019-12-27 ENCOUNTER — OFFICE VISIT (OUTPATIENT)
Dept: SLEEP MEDICINE | Facility: HOSPITAL | Age: 69
End: 2019-12-27

## 2019-12-27 VITALS
DIASTOLIC BLOOD PRESSURE: 67 MMHG | BODY MASS INDEX: 48.82 KG/M2 | HEART RATE: 78 BPM | HEIGHT: 65 IN | SYSTOLIC BLOOD PRESSURE: 128 MMHG | OXYGEN SATURATION: 91 % | WEIGHT: 293 LBS

## 2019-12-27 DIAGNOSIS — G47.33 OSA (OBSTRUCTIVE SLEEP APNEA): Primary | ICD-10-CM

## 2019-12-27 PROCEDURE — 99212 OFFICE O/P EST SF 10 MIN: CPT | Performed by: NURSE PRACTITIONER

## 2019-12-27 RX ORDER — OMEPRAZOLE 40 MG/1
CAPSULE, DELAYED RELEASE ORAL DAILY
COMMUNITY
Start: 2019-12-06 | End: 2021-09-24

## 2019-12-27 RX ORDER — ROSUVASTATIN CALCIUM 20 MG/1
TABLET, COATED ORAL DAILY
COMMUNITY
Start: 2019-12-06

## 2019-12-27 RX ORDER — NAPROXEN 500 MG/1
TABLET ORAL
COMMUNITY
Start: 2019-10-22 | End: 2020-06-08

## 2019-12-27 NOTE — PROGRESS NOTES
Patient states that the dme has requested a decrease of min pressure back to 8 until they see her in 2 weeks. They believe the pressure is drying her out.

## 2019-12-27 NOTE — PROGRESS NOTES
Chief Complaint:   Chief Complaint   Patient presents with   • Follow-up       HPI:    Romel Wilson is a 69 y.o. female here for follow-up of sleep apnea.  Patient was last seen 9/23/2019.  Patient was still having excessive daytime sleepiness as well as a mild increased AHI.  Patient's settings were increased at that time to 11 to 20 cm H2O.  Patient then did call 10/31/2019 and did want her minimum pressure lowered back to 9 cm H2O.  This was done for patient.  Patient states she is doing well now with CPAP therapy though is having very dry mucous membranes.  Patient has increased humidity setting on her CPAP and also using over-the-counter mouth moisturizers without relief.  Patient does wish to continue CPAP and lieu of these symptoms.  Patient is going to speak again to her DME to see if anything else can be done about the moisture level.        Current medications are:   Current Outpatient Medications:   •  bumetanide (BUMEX) 1 MG tablet, , Disp: , Rfl:   •  fluticasone (FLONASE) 50 MCG/ACT nasal spray, , Disp: , Rfl:   •  furosemide (LASIX) 40 MG tablet, Take 40 mg by mouth 2 (Two) Times a Day., Disp: , Rfl:   •  levothyroxine (SYNTHROID, LEVOTHROID) 125 MCG tablet, , Disp: , Rfl:   •  naproxen (NAPROSYN) 500 MG tablet, , Disp: , Rfl:   •  omeprazole (priLOSEC) 40 MG capsule, , Disp: , Rfl:   •  pravastatin (PRAVACHOL) 40 MG tablet, Take 40 mg by mouth Daily., Disp: , Rfl:   •  rosuvastatin (CRESTOR) 20 MG tablet, , Disp: , Rfl:   •  vitamin B-12 (CYANOCOBALAMIN) 100 MCG tablet, , Disp: , Rfl: .      The patient's relevant past medical, surgical, family and social history were reviewed and updated in Epic as appropriate.       Review of Systems   Constitutional: Positive for fatigue.   HENT:        Dry mouth   Eyes: Positive for visual disturbance.   Respiratory: Positive for apnea and cough.    Cardiovascular: Positive for chest pain and leg swelling.   Gastrointestinal:        Heartburn    Musculoskeletal: Positive for arthralgias, back pain, gait problem, joint swelling and myalgias.   Allergic/Immunologic: Positive for environmental allergies.   Psychiatric/Behavioral: Positive for sleep disturbance. The patient is nervous/anxious.    All other systems reviewed and are negative.        Objective:    Physical Exam   Constitutional: She is oriented to person, place, and time. She appears well-developed and well-nourished.   HENT:   Head: Normocephalic and atraumatic.   Mouth/Throat: Oropharynx is clear and moist.   Class 4 airway   Eyes: Conjunctivae are normal.   Neck: Neck supple.   Cardiovascular: Normal rate and regular rhythm.   Pulmonary/Chest: Effort normal and breath sounds normal.   Neurological: She is alert and oriented to person, place, and time.   Skin: Skin is warm and dry.   Psychiatric: She has a normal mood and affect. Her behavior is normal. Judgment and thought content normal.   Nursing note and vitals reviewed.    56/57 days of use.  Greater than 4-hour use 96.5%.  90% pressure 13.3.  AHI 4.3.  Download reviewed with patient.    ASSESSMENT/PLAN    Romel was seen today for follow-up.    Diagnoses and all orders for this visit:    NANCY (obstructive sleep apnea)            1. Counseled patient regarding multimodal approach with healthy nutrition, healthy sleep, regular physical activity, social activities, counseling, and medications. Encouraged to practice lateral  sleep position. Avoid alcohol and sedatives close to bedtime.  2.   Refill supplies x1 year.  Return to clinic 1 year or sooner symptoms warrant.  I have reviewed the results of my evaluation and impression and discussed my recommendations in detail with the patient.      Signed by  KATELYNN Caraballo    December 27, 2019      CC: Lena Bush MD          No ref. provider found

## 2020-01-15 ENCOUNTER — TELEPHONE (OUTPATIENT)
Dept: SLEEP MEDICINE | Facility: HOSPITAL | Age: 70
End: 2020-01-15

## 2020-01-15 NOTE — TELEPHONE ENCOUNTER
Patient called and states that she has been having a lot of trouble with goulds and wants to know if she could go on and try the mandibular device that you all discussed.

## 2020-01-16 DIAGNOSIS — G47.33 OSA (OBSTRUCTIVE SLEEP APNEA): Primary | ICD-10-CM

## 2020-01-16 NOTE — TELEPHONE ENCOUNTER
SPOKE TO PATIENT AND ADVISED HER THAT I WILL MAIL THE ORDER TO HER SO THAT SHE CAN SCHEDULE AN APPOINTMENT WITH HER DENTIST. PATIENT VERBALIZED UNDERSTANDING AND APPRECIATION.

## 2020-02-04 ENCOUNTER — TELEPHONE (OUTPATIENT)
Dept: SLEEP MEDICINE | Facility: HOSPITAL | Age: 70
End: 2020-02-04

## 2020-02-04 NOTE — TELEPHONE ENCOUNTER
PT STATES SEVERAL ATTEMPTS MADE TO CONTACT [employer] AND NO RESOONSE, PT WISHES TO CHANGE DME COMPANY

## 2020-02-05 NOTE — TELEPHONE ENCOUNTER
SPOKE WITH PATIENT. SHE STATES THAT SHE HAS TRIED TO CONTACT [employer] SEVERAL TIMES OVER THE PAST WEEKS WITH NO RESPONSE. SHE HAS EVEN LEFT A VM FOR THE MANAGER WITH NO RETURN CALL. SHE FEELS THAT HER MACHINE IS NOT WORKING PROPERLY AND WOULD LIKE TO DISCUSS WITH A RESPIRATORY THERAPIST. SHE WOULD LIKE A NEW [employer] COMPANY. FAXED INFORMATION TO [employer].

## 2020-05-04 ENCOUNTER — APPOINTMENT (OUTPATIENT)
Dept: GENERAL RADIOLOGY | Facility: HOSPITAL | Age: 70
End: 2020-05-04

## 2020-05-04 ENCOUNTER — HOSPITAL ENCOUNTER (EMERGENCY)
Facility: HOSPITAL | Age: 70
Discharge: HOME | End: 2020-05-04
Attending: EMERGENCY MEDICINE | Admitting: EMERGENCY MEDICINE

## 2020-05-04 VITALS
HEART RATE: 85 BPM | BODY MASS INDEX: 48.82 KG/M2 | TEMPERATURE: 99.2 F | HEIGHT: 65 IN | SYSTOLIC BLOOD PRESSURE: 124 MMHG | RESPIRATION RATE: 20 BRPM | OXYGEN SATURATION: 89 % | WEIGHT: 293 LBS | DIASTOLIC BLOOD PRESSURE: 69 MMHG

## 2020-05-04 DIAGNOSIS — E79.0 ELEVATED URIC ACID IN BLOOD: ICD-10-CM

## 2020-05-04 DIAGNOSIS — M13.10 MONOARTICULAR ARTHRITIS: Primary | ICD-10-CM

## 2020-05-04 LAB
ALBUMIN SERPL-MCNC: 4 G/DL (ref 3.5–5.2)
ALBUMIN/GLOB SERPL: 1 G/DL
ALP SERPL-CCNC: 92 U/L (ref 39–117)
ALT SERPL W P-5'-P-CCNC: 19 U/L (ref 1–33)
ANION GAP SERPL CALCULATED.3IONS-SCNC: 14 MMOL/L (ref 5–15)
AST SERPL-CCNC: 22 U/L (ref 1–32)
BASOPHILS # BLD AUTO: 0.03 10*3/MM3 (ref 0–0.2)
BASOPHILS NFR BLD AUTO: 0.2 % (ref 0–1.5)
BILIRUB SERPL-MCNC: 0.4 MG/DL (ref 0.2–1.2)
BUN BLD-MCNC: 13 MG/DL (ref 8–23)
BUN/CREAT SERPL: 10.9 (ref 7–25)
CALCIUM SPEC-SCNC: 9.3 MG/DL (ref 8.6–10.5)
CHLORIDE SERPL-SCNC: 92 MMOL/L (ref 98–107)
CO2 SERPL-SCNC: 35 MMOL/L (ref 22–29)
CREAT BLD-MCNC: 1.19 MG/DL (ref 0.57–1)
CRP SERPL-MCNC: 1.12 MG/DL (ref 0–0.5)
DEPRECATED RDW RBC AUTO: 49 FL (ref 37–54)
EOSINOPHIL # BLD AUTO: 0.07 10*3/MM3 (ref 0–0.4)
EOSINOPHIL NFR BLD AUTO: 0.5 % (ref 0.3–6.2)
ERYTHROCYTE [DISTWIDTH] IN BLOOD BY AUTOMATED COUNT: 14.9 % (ref 12.3–15.4)
GFR SERPL CREATININE-BSD FRML MDRD: 54 ML/MIN/1.73
GLOBULIN UR ELPH-MCNC: 4.1 GM/DL
GLUCOSE BLD-MCNC: 108 MG/DL (ref 65–99)
HCT VFR BLD AUTO: 44.4 % (ref 34–46.6)
HGB BLD-MCNC: 14.4 G/DL (ref 12–15.9)
IMM GRANULOCYTES # BLD AUTO: 0.06 10*3/MM3 (ref 0–0.05)
IMM GRANULOCYTES NFR BLD AUTO: 0.4 % (ref 0–0.5)
LYMPHOCYTES # BLD AUTO: 3.18 10*3/MM3 (ref 0.7–3.1)
LYMPHOCYTES NFR BLD AUTO: 20.9 % (ref 19.6–45.3)
MCH RBC QN AUTO: 28.9 PG (ref 26.6–33)
MCHC RBC AUTO-ENTMCNC: 32.4 G/DL (ref 31.5–35.7)
MCV RBC AUTO: 89 FL (ref 79–97)
MONOCYTES # BLD AUTO: 0.98 10*3/MM3 (ref 0.1–0.9)
MONOCYTES NFR BLD AUTO: 6.4 % (ref 5–12)
NEUTROPHILS # BLD AUTO: 10.93 10*3/MM3 (ref 1.7–7)
NEUTROPHILS NFR BLD AUTO: 71.6 % (ref 42.7–76)
NRBC BLD AUTO-RTO: 0 /100 WBC (ref 0–0.2)
PLATELET # BLD AUTO: 324 10*3/MM3 (ref 140–450)
PMV BLD AUTO: 9.7 FL (ref 6–12)
POTASSIUM BLD-SCNC: 2.9 MMOL/L (ref 3.5–5.2)
PROT SERPL-MCNC: 8.1 G/DL (ref 6–8.5)
RBC # BLD AUTO: 4.99 10*6/MM3 (ref 3.77–5.28)
SODIUM BLD-SCNC: 141 MMOL/L (ref 136–145)
URATE SERPL-MCNC: 9.9 MG/DL (ref 2.4–5.7)
WBC NRBC COR # BLD: 15.25 10*3/MM3 (ref 3.4–10.8)

## 2020-05-04 PROCEDURE — 99284 EMERGENCY DEPT VISIT MOD MDM: CPT

## 2020-05-04 PROCEDURE — 85025 COMPLETE CBC W/AUTO DIFF WBC: CPT | Performed by: EMERGENCY MEDICINE

## 2020-05-04 PROCEDURE — 80053 COMPREHEN METABOLIC PANEL: CPT | Performed by: EMERGENCY MEDICINE

## 2020-05-04 PROCEDURE — 84550 ASSAY OF BLOOD/URIC ACID: CPT | Performed by: EMERGENCY MEDICINE

## 2020-05-04 PROCEDURE — 63710000001 PREDNISONE PER 1 MG: Performed by: NURSE PRACTITIONER

## 2020-05-04 PROCEDURE — 73630 X-RAY EXAM OF FOOT: CPT

## 2020-05-04 PROCEDURE — 63710000001 ONDANSETRON PER 8 MG: Performed by: EMERGENCY MEDICINE

## 2020-05-04 PROCEDURE — 86140 C-REACTIVE PROTEIN: CPT | Performed by: EMERGENCY MEDICINE

## 2020-05-04 RX ORDER — CEPHALEXIN 250 MG/1
500 CAPSULE ORAL ONCE
Status: COMPLETED | OUTPATIENT
Start: 2020-05-04 | End: 2020-05-04

## 2020-05-04 RX ORDER — ONDANSETRON 4 MG/1
4 TABLET, FILM COATED ORAL ONCE
Status: COMPLETED | OUTPATIENT
Start: 2020-05-04 | End: 2020-05-04

## 2020-05-04 RX ORDER — PREDNISONE 20 MG/1
60 TABLET ORAL ONCE
Status: COMPLETED | OUTPATIENT
Start: 2020-05-04 | End: 2020-05-04

## 2020-05-04 RX ORDER — HYDROCODONE BITARTRATE AND ACETAMINOPHEN 7.5; 325 MG/1; MG/1
1 TABLET ORAL EVERY 8 HOURS PRN
Qty: 12 TABLET | Refills: 0 | OUTPATIENT
Start: 2020-05-04 | End: 2020-06-08

## 2020-05-04 RX ORDER — METHYLPREDNISOLONE 4 MG/1
TABLET ORAL
Qty: 21 TABLET | Refills: 0 | OUTPATIENT
Start: 2020-05-04 | End: 2020-06-08

## 2020-05-04 RX ORDER — HYDROCODONE BITARTRATE AND ACETAMINOPHEN 7.5; 325 MG/1; MG/1
1 TABLET ORAL ONCE
Status: COMPLETED | OUTPATIENT
Start: 2020-05-04 | End: 2020-05-04

## 2020-05-04 RX ORDER — ONDANSETRON 4 MG/1
4 TABLET, ORALLY DISINTEGRATING ORAL EVERY 8 HOURS PRN
Qty: 14 TABLET | Refills: 0 | OUTPATIENT
Start: 2020-05-04 | End: 2020-06-08

## 2020-05-04 RX ORDER — CEPHALEXIN 500 MG/1
500 CAPSULE ORAL 3 TIMES DAILY
Qty: 30 CAPSULE | Refills: 0 | OUTPATIENT
Start: 2020-05-04 | End: 2020-06-08

## 2020-05-04 RX ADMIN — PREDNISONE 60 MG: 20 TABLET ORAL at 21:51

## 2020-05-04 RX ADMIN — ONDANSETRON HYDROCHLORIDE 4 MG: 4 TABLET, FILM COATED ORAL at 19:34

## 2020-05-04 RX ADMIN — HYDROCODONE BITARTRATE AND ACETAMINOPHEN 1 TABLET: 7.5; 325 TABLET ORAL at 19:34

## 2020-05-04 RX ADMIN — CEPHALEXIN 500 MG: 250 CAPSULE ORAL at 21:51

## 2020-05-05 NOTE — DISCHARGE INSTRUCTIONS
Ends as directed.  Take pain medications with nausea medicine and drink ample clear fluids.  Follow-up with your primary care provider to monitor your recovery.  Thank you

## 2020-05-05 NOTE — ED PROVIDER NOTES
"Subjective   Patient presents to the emergency department with complaint of pain to her right foot which began spontaneously this morning after awakening.  Patient states that she experienced a similar pain in the left foot the week before but it resolved spontaneously.  Patient typically uses a walker.  She denies any recent falls.  She denies any fever or history of gout.  \"I have arthritis\".  Patient localizes her greatest pain in the arch and near the first MTP joint.  Patient has no history of diabetes or hypertension.      Foot Pain   Location:  Right foot on the dorsal portion as well as the distal portion of the arch and MTP joint  Quality:  8  Severity:  Moderate  Onset quality:  Sudden  Duration:  1 day  Timing:  Constant  Progression:  Unchanged  Chronicity:  New  Associated symptoms: no fever        Review of Systems   Constitutional: Negative for fever.   Endocrine: Negative.    Musculoskeletal: Positive for gait problem (painful weight bearing). Negative for back pain.        Pain at the first MTP joint and the dorsal right foot.  No trauma   Allergic/Immunologic: Negative.    Hematological: Does not bruise/bleed easily.   All other systems reviewed and are negative.      Past Medical History:   Diagnosis Date   • Arthritis    • Disease of thyroid gland        No Known Allergies    Past Surgical History:   Procedure Laterality Date   • HYSTERECTOMY     • THYROIDECTOMY         Family History   Problem Relation Age of Onset   • Hypertension Mother        Social History     Socioeconomic History   • Marital status: Single     Spouse name: Not on file   • Number of children: Not on file   • Years of education: Not on file   • Highest education level: Not on file   Tobacco Use   • Smoking status: Current Every Day Smoker     Packs/day: 0.50     Types: Cigarettes   • Smokeless tobacco: Never Used   Substance and Sexual Activity   • Alcohol use: No     Comment: once a year   • Drug use: No           Objective "   Physical Exam   Constitutional: She is oriented to person, place, and time. She appears well-developed and well-nourished. No distress.   HENT:   Head: Normocephalic.   Eyes: Conjunctivae are normal.   Cardiovascular: Normal rate and regular rhythm.   Pulmonary/Chest: Effort normal and breath sounds normal.   Abdominal: Soft. Bowel sounds are normal.   Musculoskeletal:   Right foot: There is no erythema or warmth or soft tissue swelling to the foot.  Skin is in good repair.  Pulses are normal.  Capillary refill is brisk.  Palpation of the first MTP joint seems to be the focus for the pain.  Proximal and distal joints are negative.  Neurovascular exam is negative.  No calf pain.  No hip pain pelvis is stable left foot is unremarkable.   Neurological: She is alert and oriented to person, place, and time.   Skin: Skin is warm and dry. Capillary refill takes less than 2 seconds. No rash noted. She is not diaphoretic. No erythema.   Psychiatric: She has a normal mood and affect. Her behavior is normal. Judgment and thought content normal.   Nursing note and vitals reviewed.      Procedures           ED Course            Recent Results (from the past 24 hour(s))   Comprehensive Metabolic Panel    Collection Time: 05/04/20  7:35 PM   Result Value Ref Range    Glucose 108 (H) 65 - 99 mg/dL    BUN 13 8 - 23 mg/dL    Creatinine 1.19 (H) 0.57 - 1.00 mg/dL    Sodium 141 136 - 145 mmol/L    Potassium 2.9 (L) 3.5 - 5.2 mmol/L    Chloride 92 (L) 98 - 107 mmol/L    CO2 35.0 (H) 22.0 - 29.0 mmol/L    Calcium 9.3 8.6 - 10.5 mg/dL    Total Protein 8.1 6.0 - 8.5 g/dL    Albumin 4.00 3.50 - 5.20 g/dL    ALT (SGPT) 19 1 - 33 U/L    AST (SGOT) 22 1 - 32 U/L    Alkaline Phosphatase 92 39 - 117 U/L    Total Bilirubin 0.4 0.2 - 1.2 mg/dL    eGFR  African Amer 54 (L) >60 mL/min/1.73    Globulin 4.1 gm/dL    A/G Ratio 1.0 g/dL    BUN/Creatinine Ratio 10.9 7.0 - 25.0    Anion Gap 14.0 5.0 - 15.0 mmol/L   Uric Acid    Collection Time: 05/04/20   7:35 PM   Result Value Ref Range    Uric Acid 9.9 (H) 2.4 - 5.7 mg/dL   C-reactive Protein    Collection Time: 05/04/20  7:35 PM   Result Value Ref Range    C-Reactive Protein 1.12 (H) 0.00 - 0.50 mg/dL   CBC Auto Differential    Collection Time: 05/04/20  7:35 PM   Result Value Ref Range    WBC 15.25 (H) 3.40 - 10.80 10*3/mm3    RBC 4.99 3.77 - 5.28 10*6/mm3    Hemoglobin 14.4 12.0 - 15.9 g/dL    Hematocrit 44.4 34.0 - 46.6 %    MCV 89.0 79.0 - 97.0 fL    MCH 28.9 26.6 - 33.0 pg    MCHC 32.4 31.5 - 35.7 g/dL    RDW 14.9 12.3 - 15.4 %    RDW-SD 49.0 37.0 - 54.0 fl    MPV 9.7 6.0 - 12.0 fL    Platelets 324 140 - 450 10*3/mm3    Neutrophil % 71.6 42.7 - 76.0 %    Lymphocyte % 20.9 19.6 - 45.3 %    Monocyte % 6.4 5.0 - 12.0 %    Eosinophil % 0.5 0.3 - 6.2 %    Basophil % 0.2 0.0 - 1.5 %    Immature Grans % 0.4 0.0 - 0.5 %    Neutrophils, Absolute 10.93 (H) 1.70 - 7.00 10*3/mm3    Lymphocytes, Absolute 3.18 (H) 0.70 - 3.10 10*3/mm3    Monocytes, Absolute 0.98 (H) 0.10 - 0.90 10*3/mm3    Eosinophils, Absolute 0.07 0.00 - 0.40 10*3/mm3    Basophils, Absolute 0.03 0.00 - 0.20 10*3/mm3    Immature Grans, Absolute 0.06 (H) 0.00 - 0.05 10*3/mm3    nRBC 0.0 0.0 - 0.2 /100 WBC     Note: In addition to lab results from this visit, the labs listed above may include labs taken at another facility or during a different encounter within the last 24 hours. Please correlate lab times with ED admission and discharge times for further clarification of the services performed during this visit.    XR Foot 3+ View Right   Final Result   Mild soft tissue swelling diffusely. The bones are demineralized. Otherwise no acute abnormality right foot      Signer Name: Kacie Hurtado MD    Signed: 5/4/2020 8:52 PM    Workstation Name: [email or web address]     [facility]        Vitals:    05/04/20 1719 05/04/20 2000 05/04/20 2030 05/04/20 2100   BP: 116/72 119/84 125/80 124/69   BP Location: Right arm      Patient Position: Sitting       Pulse: 87 84 80 85   Resp: 20      Temp:       TempSrc:       SpO2: 93% 93% 93% (!) 89%   Weight:       Height:         Medications   HYDROcodone-acetaminophen (NORCO) 7.5-325 MG per tablet 1 tablet (1 tablet Oral Given 5/4/20 1934)   ondansetron (ZOFRAN) tablet 4 mg (4 mg Oral Given 5/4/20 1934)   cephalexin (KEFLEX) capsule 500 mg (500 mg Oral Given 5/4/20 2151)   predniSONE (DELTASONE) tablet 60 mg (60 mg Oral Given 5/4/20 2151)     ECG/EMG Results (last 24 hours)     ** No results found for the last 24 hours. **        No orders to display                                         MDM    Final diagnoses:   Monoarticular arthritis   Elevated uric acid in blood            Farhana Gaffney, KATELYNN  05/05/20 0102

## 2020-06-08 ENCOUNTER — APPOINTMENT (OUTPATIENT)
Dept: GENERAL RADIOLOGY | Facility: HOSPITAL | Age: 70
End: 2020-06-08

## 2020-06-08 ENCOUNTER — HOSPITAL ENCOUNTER (EMERGENCY)
Facility: HOSPITAL | Age: 70
Discharge: HOME | End: 2020-06-08
Attending: EMERGENCY MEDICINE | Admitting: EMERGENCY MEDICINE

## 2020-06-08 VITALS
DIASTOLIC BLOOD PRESSURE: 55 MMHG | TEMPERATURE: 98.3 F | WEIGHT: 293 LBS | HEIGHT: 65 IN | OXYGEN SATURATION: 94 % | BODY MASS INDEX: 48.82 KG/M2 | RESPIRATION RATE: 20 BRPM | SYSTOLIC BLOOD PRESSURE: 111 MMHG | HEART RATE: 67 BPM

## 2020-06-08 DIAGNOSIS — M79.671 BILATERAL FOOT PAIN: Primary | ICD-10-CM

## 2020-06-08 DIAGNOSIS — M72.2 PLANTAR FASCIITIS, BILATERAL: ICD-10-CM

## 2020-06-08 DIAGNOSIS — M79.672 BILATERAL FOOT PAIN: Primary | ICD-10-CM

## 2020-06-08 LAB
ALBUMIN SERPL-MCNC: 3.8 G/DL (ref 3.5–5.2)
ALBUMIN/GLOB SERPL: 1.1 G/DL
ALP SERPL-CCNC: 84 U/L (ref 39–117)
ALT SERPL W P-5'-P-CCNC: 8 U/L (ref 1–33)
ANION GAP SERPL CALCULATED.3IONS-SCNC: 12 MMOL/L (ref 5–15)
AST SERPL-CCNC: 15 U/L (ref 1–32)
BASOPHILS # BLD AUTO: 0.05 10*3/MM3 (ref 0–0.2)
BASOPHILS NFR BLD AUTO: 0.4 % (ref 0–1.5)
BILIRUB SERPL-MCNC: 0.6 MG/DL (ref 0.2–1.2)
BUN BLD-MCNC: 11 MG/DL (ref 8–23)
BUN/CREAT SERPL: 11.5 (ref 7–25)
CALCIUM SPEC-SCNC: 9.1 MG/DL (ref 8.6–10.5)
CHLORIDE SERPL-SCNC: 97 MMOL/L (ref 98–107)
CO2 SERPL-SCNC: 32 MMOL/L (ref 22–29)
CREAT BLD-MCNC: 0.96 MG/DL (ref 0.57–1)
DEPRECATED RDW RBC AUTO: 49.6 FL (ref 37–54)
EOSINOPHIL # BLD AUTO: 0.05 10*3/MM3 (ref 0–0.4)
EOSINOPHIL NFR BLD AUTO: 0.4 % (ref 0.3–6.2)
ERYTHROCYTE [DISTWIDTH] IN BLOOD BY AUTOMATED COUNT: 14.6 % (ref 12.3–15.4)
GFR SERPL CREATININE-BSD FRML MDRD: 70 ML/MIN/1.73
GLOBULIN UR ELPH-MCNC: 3.6 GM/DL
GLUCOSE BLD-MCNC: 103 MG/DL (ref 65–99)
HCT VFR BLD AUTO: 42.5 % (ref 34–46.6)
HGB BLD-MCNC: 13.5 G/DL (ref 12–15.9)
IMM GRANULOCYTES # BLD AUTO: 0.07 10*3/MM3 (ref 0–0.05)
IMM GRANULOCYTES NFR BLD AUTO: 0.5 % (ref 0–0.5)
LYMPHOCYTES # BLD AUTO: 2.29 10*3/MM3 (ref 0.7–3.1)
LYMPHOCYTES NFR BLD AUTO: 16.8 % (ref 19.6–45.3)
MCH RBC QN AUTO: 29.3 PG (ref 26.6–33)
MCHC RBC AUTO-ENTMCNC: 31.8 G/DL (ref 31.5–35.7)
MCV RBC AUTO: 92.2 FL (ref 79–97)
MONOCYTES # BLD AUTO: 1.1 10*3/MM3 (ref 0.1–0.9)
MONOCYTES NFR BLD AUTO: 8.1 % (ref 5–12)
NEUTROPHILS # BLD AUTO: 10.07 10*3/MM3 (ref 1.7–7)
NEUTROPHILS NFR BLD AUTO: 73.8 % (ref 42.7–76)
NRBC BLD AUTO-RTO: 0 /100 WBC (ref 0–0.2)
PLATELET # BLD AUTO: 263 10*3/MM3 (ref 140–450)
PMV BLD AUTO: 9.4 FL (ref 6–12)
POTASSIUM BLD-SCNC: 3.3 MMOL/L (ref 3.5–5.2)
PROT SERPL-MCNC: 7.4 G/DL (ref 6–8.5)
RBC # BLD AUTO: 4.61 10*6/MM3 (ref 3.77–5.28)
SODIUM BLD-SCNC: 141 MMOL/L (ref 136–145)
WBC NRBC COR # BLD: 13.63 10*3/MM3 (ref 3.4–10.8)

## 2020-06-08 PROCEDURE — 85025 COMPLETE CBC W/AUTO DIFF WBC: CPT | Performed by: EMERGENCY MEDICINE

## 2020-06-08 PROCEDURE — 73630 X-RAY EXAM OF FOOT: CPT

## 2020-06-08 PROCEDURE — 80053 COMPREHEN METABOLIC PANEL: CPT | Performed by: EMERGENCY MEDICINE

## 2020-06-08 PROCEDURE — 99284 EMERGENCY DEPT VISIT MOD MDM: CPT

## 2020-06-08 RX ORDER — MULTIVIT WITH MINERALS/LUTEIN
250 TABLET ORAL DAILY
COMMUNITY

## 2020-06-08 RX ORDER — HYDROCODONE BITARTRATE AND ACETAMINOPHEN 7.5; 325 MG/1; MG/1
1 TABLET ORAL ONCE
Status: COMPLETED | OUTPATIENT
Start: 2020-06-08 | End: 2020-06-08

## 2020-06-08 RX ORDER — MELATONIN
1000 DAILY
COMMUNITY

## 2020-06-08 RX ADMIN — HYDROCODONE BITARTRATE AND ACETAMINOPHEN 1 TABLET: 7.5; 325 TABLET ORAL at 08:51

## 2020-06-08 NOTE — ED PROVIDER NOTES
EMERGENCY DEPARTMENT ENCOUNTER    Room Number:  JAYCOB/JAYCOB  Date of encounter:  6/9/2020  PCP: Lena Bush MD  Historian: Patient      HPI:  Chief Complaint: Bilateral foot pain    A complete HPI/ROS/PMH/PSH/SH/FH are unobtainable due to: Not applicable    Context: Romel Wilson is a 70 y.o. female who presents to the ED c/o bilateral foot pain.  She was seen here about 10 days ago for the same problem mostly in the right leg.  It seems to be in both feet now.  She is run out of the medication they gave her needs more.  She denies any trauma.  She has had no fevers no chills.  Denies any lower leg edema.  She has had no open wounds.  No redness or red streaks.      PAST MEDICAL HISTORY  Active Ambulatory Problems     Diagnosis Date Noted   • Obstructive sleep apnea, adult 06/28/2017   • Morbid obesity due to excess calories (CMS/[facility]) 06/28/2017     Resolved Ambulatory Problems     Diagnosis Date Noted   • No Resolved Ambulatory Problems     Past Medical History:   Diagnosis Date   • Arthritis    • Disease of thyroid gland    • Gout          PAST SURGICAL HISTORY  Past Surgical History:   Procedure Laterality Date   • HYSTERECTOMY     • THYROIDECTOMY           FAMILY HISTORY  Family History   Problem Relation Age of Onset   • Hypertension Mother          SOCIAL HISTORY  Social History     Socioeconomic History   • Marital status: Single     Spouse name: Not on file   • Number of children: Not on file   • Years of education: Not on file   • Highest education level: Not on file   Tobacco Use   • Smoking status: Current Every Day Smoker     Packs/day: 0.50     Types: Cigarettes   • Smokeless tobacco: Never Used   Substance and Sexual Activity   • Alcohol use: No     Comment: once a year   • Drug use: No         ALLERGIES  Patient has no known allergies.        REVIEW OF SYSTEMS  Review of Systems     All systems reviewed and negative except for those discussed in HPI.       PHYSICAL EXAM    I have reviewed the triage  vital signs and nursing notes.    ED Triage Vitals [06/08/20 0813]   Temp Heart Rate Resp BP SpO2   98.3 °F (36.8 °C) 82 20 101/65 99 %      Temp src Heart Rate Source Patient Position BP Location FiO2 (%)   Oral Monitor Sitting Left arm --       Physical Exam  GENERAL: Warm dry nontoxic well developed.  Appears in no acute distress.  HENT: Nares patent  EYES: No scleral icterus  CV: Regular rhythm, regular rate  RESPIRATORY: Normal effort.  No audible wheezes, rales or rhonchi  ABDOMEN: Soft, nontender  MUSCULOSKELETAL: No deformities.  Both feet have onychomycosis.  She is tender across the plantar fascia of both feet.  There is no open wounds no redness no induration.  There is no regional lymphadenitis or lymphadenopathy.  She has no point tenderness at the MP joint or the ankle.  NEURO: Alert, moves all extremities, follows commands  SKIN: Warm, dry, no rash visualized        LAB RESULTS  No results found for this or any previous visit (from the past 24 hour(s)).    Ordered the above labs and independently reviewed the results.        RADIOLOGY  No Radiology Exams Resulted Within Past 24 Hours    I ordered and reviewed the above noted radiographic studies.    I viewed images of x-ray of the foot which showed no acute findings per my independent interpretation.  See radiologist's dictation for official interpretation.        PROCEDURES    Procedures      MEDICATIONS GIVEN IN ER    Medications   HYDROcodone-acetaminophen (NORCO) 7.5-325 MG per tablet 1 tablet (1 tablet Oral Given 6/8/20 0851)         PROGRESS, DATA ANALYSIS, CONSULTS, AND MEDICAL DECISION MAKING    All labs have been independently reviewed by me.  All radiology studies have been reviewed by me and the radiologist dictating the report.   EKG's have been independently viewed and interpreted by me.      Differential diagnoses: Plantar fasciitis, pathological fracture           Discussed the findings with the patient.  Advised her could not write her  for any more narcotics.        AS OF 18:44 VITALS:    BP - 111/55  HR - 67  TEMP - 98.3 °F (36.8 °C) (Oral)  O2 SATS - 94%        DIAGNOSIS  Final diagnoses:   Bilateral foot pain   Plantar fasciitis, bilateral         DISPOSITION  DISCHARGE    Patient discharged in stable condition.    Reviewed implications of results, diagnosis, meds, responsibility to follow up, warning signs and symptoms of possible worsening, potential complications and reasons to return to ER.    Patient/Family voiced understanding of above instructions.    Discussed plan for discharge, as there is no emergent indication for admission.  Pt/family is agreeable and understands need for follow up and possible repeat testing.  Pt/family is aware that discharge does not mean that nothing is wrong but that it indicates no emergency is currently present that requires admission and they must continue care with follow-up as given below or with a physician of their choice.     FOLLOW-UP  Lena Bush MD  [address] [place] [address]  [phone]               Medication List      New Prescriptions    diclofenac 50 MG EC tablet  Commonly known as:  VOLTAREN  Take 1 tablet by mouth 3 (Three) Times a Day.        Stop    cephalexin 500 MG capsule  Commonly known as:  KEFLEX     HYDROcodone-acetaminophen 7.5-325 MG per tablet  Commonly known as:  NORCO     methylPREDNISolone 4 MG tablet  Commonly known as:  MEDROL (BENY)     naproxen 500 MG tablet  Commonly known as:  NAPROSYN     ondansetron ODT 4 MG disintegrating tablet  Commonly known as:  ZOFRAN-ODT                     Clyde Suarez PA  06/09/20 2364

## 2020-08-12 ENCOUNTER — HOSPITAL ENCOUNTER (OUTPATIENT)
Dept: SLEEP MEDICINE | Facility: HOSPITAL | Age: 70
End: 2020-08-12

## 2020-08-12 VITALS
DIASTOLIC BLOOD PRESSURE: 64 MMHG | BODY MASS INDEX: 48.82 KG/M2 | RESPIRATION RATE: 16 BRPM | HEART RATE: 58 BPM | WEIGHT: 293 LBS | TEMPERATURE: 96.9 F | HEIGHT: 65 IN | SYSTOLIC BLOOD PRESSURE: 112 MMHG

## 2020-08-12 DIAGNOSIS — E66.01 MORBID OBESITY DUE TO EXCESS CALORIES (HCC): ICD-10-CM

## 2020-08-12 DIAGNOSIS — G47.33 OSA (OBSTRUCTIVE SLEEP APNEA): ICD-10-CM

## 2020-08-12 DIAGNOSIS — Z71.89 CPAP USE COUNSELING: ICD-10-CM

## 2020-08-12 DIAGNOSIS — Z78.9 DIFFICULTY WITH CPAP USE: Primary | ICD-10-CM

## 2020-08-12 DIAGNOSIS — G47.33 OSA ON CPAP: ICD-10-CM

## 2020-08-12 DIAGNOSIS — G47.33 OBSTRUCTIVE SLEEP APNEA, ADULT: ICD-10-CM

## 2020-08-12 DIAGNOSIS — Z99.89 OSA ON CPAP: ICD-10-CM

## 2020-08-12 DIAGNOSIS — Z99.89 USES CONTINUOUS POSITIVE AIRWAY PRESSURE (CPAP) VENTILATION AT HOME: ICD-10-CM

## 2020-08-12 NOTE — ADDENDUM NOTE
Encounter addended by: Deuce Carlson, RRT, RPSGT, RST, CCSH on: 8/12/2020 4:45 PM   Actions taken: Sign clinical note

## 2020-08-12 NOTE — PROGRESS NOTES
Subjective  Ms. Romel Wilson arrived to her appointment for today’s visit with me via [employer] ride-assistance program.  She is complaining of difficulty with the mask coming off in the middle of the night.  She complains of the mask not covering enough of her nose, she came in on a Respironics Dreamwear Full Face size Medium-wide mask.  She explains it as sliding off and herself being a “wild sleeper”.  She is also complaining of dry mouth and dry nasal cavities.       Objective  Ms. Wilson is a very nice black female in no apparent distress.  She walks with a walker on her own and states she only uses the walker for help balancing occasionally and that she mostly uses it to make her care team happy.    • Height - 65”, Weight - 304 lbs, BMI - , Temperature 96.9 degrees Fahrenheit, Heart-rate is 58 bpm, Blood pressure is 112/64 mmHg with MAP of 79 mmHg.  • Medications were reviewed with her.  • Auto-CPAP download of last 30 days is present.  Many nights show a slightly elevated AHI but the overall AHI based on the last 30 days is 5.6 and 90% pressure is 11.5 cmh2o even though 90% pressure on last several nights had been higher.      Assessment  The following were addressed during her visit  • Proper cleaning procedures - she claimed that she uses Rhona detergent every night.  The equipment is heavily stained from tobacco use.    • Ms. Wilson demonstrated how she dons the CPAP mask.  • She showed me how she has to work-around the break in the machine.  The clasp that holds in the tank is broken.  • We put her on Auto CPAP 10 cmh2o and she was able to tolerate this new pressure setting that should eliminate some of her respiratory events that are happening at night.    • Respironics Cecy View Full Face Mask size large cushion was tried on her.  She was pleased with this mask, from the fit to how the hose connects.  She was educated on its fit and use and the quick-disconnects.  It was effective with no audible leak.     • The heating system on the machine also appears to be broken and she explained it uses hardly any water at all and I verified that the air is not very warm after turning both the base plate and the tubing on maximum settings.  She definitely could benefit from a new machine.    Plan  Ms. Wilson was able to lie on the bed with the minimum CPAP of 10 cmh2o comfortably tolerated on the Respironics Cecy View Full Face Mask size Large cushion.  She breathed on the machine for 15 minutes.  • Recommend an increase in minimum CPAP from 8cmh2o to 10 cmh2o.  • Recommend changing mask to Respironics Cecy View FF size large  • Recommend repair/replacement of Auto CPAP machine as hers is broken and is over 3 years old.    • Have her follow up after getting new machine and hopefully her download will show decreased AHI and potentially raise minimum CPAP up more if she is able to tolerate.

## 2020-08-12 NOTE — PROGRESS NOTES
A total of 55 minutes were spent with Ms. Wilson and myself with equipment fit and settings adjustments.

## 2020-08-14 DIAGNOSIS — G47.33 OSA (OBSTRUCTIVE SLEEP APNEA): Primary | ICD-10-CM

## 2020-08-17 NOTE — ADDENDUM NOTE
Encounter addended by: Tamika Orozco on: 8/17/2020 11:59 AM   Actions taken: Charge Capture section accepted, Visit diagnoses modified

## 2020-09-25 ENCOUNTER — OFFICE VISIT (OUTPATIENT)
Dept: SLEEP MEDICINE | Facility: HOSPITAL | Age: 70
End: 2020-09-25

## 2020-09-25 VITALS
TEMPERATURE: 97.1 F | HEIGHT: 65 IN | OXYGEN SATURATION: 93 % | DIASTOLIC BLOOD PRESSURE: 70 MMHG | RESPIRATION RATE: 20 BRPM | WEIGHT: 293 LBS | HEART RATE: 64 BPM | SYSTOLIC BLOOD PRESSURE: 114 MMHG | BODY MASS INDEX: 48.82 KG/M2

## 2020-09-25 DIAGNOSIS — G47.33 OSA (OBSTRUCTIVE SLEEP APNEA): Primary | ICD-10-CM

## 2020-09-25 PROCEDURE — 99212 OFFICE O/P EST SF 10 MIN: CPT | Performed by: NURSE PRACTITIONER

## 2020-09-25 NOTE — PROGRESS NOTES
Chief Complaint:   Chief Complaint   Patient presents with   • Follow-up       HPI:    Romel Wilson is a 70 y.o. female here for follow-up of sleep apnea.  Patient was last seen 12/27/2019.  Patient states she is doing well with CPAP therapy.  Patient is sleeping 7 to 8 hours nightly and does go to sleep within 15 minutes.  Patient does feel refreshed upon awakening.  Patient has an [place] score of 13/24.  Patient has no concerns or complaints regarding CPAP therapy and does wish to continue.        Current medications are:   Current Outpatient Medications:   •  bumetanide (BUMEX) 1 MG tablet, Daily., Disp: , Rfl:   •  cholecalciferol (VITAMIN D3) 25 MCG (1000 UT) tablet, Take 1,000 Units by mouth Daily., Disp: , Rfl:   •  diclofenac (VOLTAREN) 50 MG EC tablet, Take 1 tablet by mouth 3 (Three) Times a Day., Disp: 15 tablet, Rfl: 0  •  fluticasone (FLONASE) 50 MCG/ACT nasal spray, Daily., Disp: , Rfl:   •  furosemide (LASIX) 40 MG tablet, Take 40 mg by mouth 2 (Two) Times a Day., Disp: , Rfl:   •  levothyroxine (SYNTHROID, LEVOTHROID) 125 MCG tablet, Daily., Disp: , Rfl:   •  omeprazole (priLOSEC) 40 MG capsule, Daily., Disp: , Rfl:   •  pravastatin (PRAVACHOL) 40 MG tablet, Take 40 mg by mouth Daily., Disp: , Rfl:   •  rosuvastatin (CRESTOR) 20 MG tablet, Daily., Disp: , Rfl:   •  vitamin B-12 (CYANOCOBALAMIN) 100 MCG tablet, Daily., Disp: , Rfl:   •  vitamin C (ASCORBIC ACID) 250 MG tablet, Take 250 mg by mouth Daily., Disp: , Rfl: .      The patient's relevant past medical, surgical, family and social history were reviewed and updated in Epic as appropriate.       Review of Systems   Eyes: Positive for visual disturbance.   Respiratory: Positive for apnea and cough.    Cardiovascular: Positive for chest pain and leg swelling.   Gastrointestinal:        Heartburn   Musculoskeletal: Positive for arthralgias, back pain, gait problem, joint swelling and myalgias.   Allergic/Immunologic: Positive for environmental  allergies.   Psychiatric/Behavioral: Positive for sleep disturbance. The patient is nervous/anxious.    All other systems reviewed and are negative.        Objective:    Physical Exam  Constitutional:       Appearance: Normal appearance. She is obese.   HENT:      Head: Normocephalic and atraumatic.      Mouth/Throat:      Mouth: Mucous membranes are moist.      Pharynx: Oropharynx is clear.      Comments: Call 4 airway  Eyes:      Conjunctiva/sclera: Conjunctivae normal.      Pupils: Pupils are equal, round, and reactive to light.   Pulmonary:      Effort: Pulmonary effort is normal.   Skin:     General: Skin is warm and dry.      Coloration: Skin is not jaundiced or pale.      Findings: No erythema.   Neurological:      Mental Status: She is alert and oriented to person, place, and time.   Psychiatric:         Mood and Affect: Mood normal.         Behavior: Behavior normal.         Thought Content: Thought content normal.         Judgment: Judgment normal.     40/42 days of use  Greater than 4-hour use 90.5  90% pressure 12.6  AHI of 3.5  Settings 10 to 20 cm H2O      ASSESSMENT/PLAN    Romel was seen today for follow-up.    Diagnoses and all orders for this visit:    NANCY (obstructive sleep apnea)  -     CPAP Therapy            1. Counseled patient regarding multimodal approach with healthy nutrition, healthy sleep, regular physical activity, social activities, counseling, and medications. Encouraged to practice lateral  sleep position. Avoid alcohol and sedatives close to bedtime.  2.   Refill supplies x1 year.  Return to clinic 1 year or sooner symptoms warrant.  I have reviewed the results of my evaluation and impression and discussed my recommendations in detail with the patient.      Signed by  KATELYNN Caraballo    September 25, 2020      CC: Lena Bush MD          No ref. provider found

## 2021-09-24 ENCOUNTER — TELEMEDICINE (OUTPATIENT)
Dept: SLEEP MEDICINE | Facility: HOSPITAL | Age: 71
End: 2021-09-24

## 2021-09-24 VITALS — HEIGHT: 65 IN | WEIGHT: 293 LBS | BODY MASS INDEX: 48.82 KG/M2

## 2021-09-24 DIAGNOSIS — G47.33 OSA (OBSTRUCTIVE SLEEP APNEA): Primary | ICD-10-CM

## 2021-09-24 PROCEDURE — 99213 OFFICE O/P EST LOW 20 MIN: CPT | Performed by: NURSE PRACTITIONER

## 2021-09-24 NOTE — PROGRESS NOTES
"  Chief Complaint:   Chief Complaint   Patient presents with   • Follow-up       HPI:    Romel Wilson is a 71 y.o. female here for follow-up of sleep apnea.  Patient was last seen 9/25/2020.  Patient states she is now very anxious using her CPAP as she did hear about the recall.  She does go the consequences of untreated sleep apnea and has continued to use and will do so until she receives her new machine.  She has registered this with [employer].  She is sleeping 6 to 8 hours nightly and does awaken if she feels \"nervous.\"  Asked if she felt nervous before the recall and she did not.  She slept very well without issue.  Patient has an [place] score of 12/24.  She is going to continue to use her CPAP at this time and will start the new one as soon as it arrives.        Current medications are:   Current Outpatient Medications:   •  bumetanide (BUMEX) 1 MG tablet, Daily., Disp: , Rfl:   •  cholecalciferol (VITAMIN D3) 25 MCG (1000 UT) tablet, Take 1,000 Units by mouth Daily., Disp: , Rfl:   •  furosemide (LASIX) 40 MG tablet, Take 40 mg by mouth 2 (Two) Times a Day., Disp: , Rfl:   •  levothyroxine (SYNTHROID, LEVOTHROID) 125 MCG tablet, Daily., Disp: , Rfl:   •  pravastatin (PRAVACHOL) 40 MG tablet, Take 40 mg by mouth Daily., Disp: , Rfl:   •  rosuvastatin (CRESTOR) 20 MG tablet, Daily., Disp: , Rfl:   •  vitamin B-12 (CYANOCOBALAMIN) 100 MCG tablet, Daily., Disp: , Rfl:   •  vitamin C (ASCORBIC ACID) 250 MG tablet, Take 250 mg by mouth Daily., Disp: , Rfl: .      The patient's relevant past medical, surgical, family and social history were reviewed and updated in Epic as appropriate.       Review of Systems   Eyes: Positive for visual disturbance.   Respiratory: Positive for apnea and cough.    Cardiovascular: Positive for chest pain.   Gastrointestinal:        Heartburn   Musculoskeletal: Positive for arthralgias, back pain, gait problem, joint swelling and myalgias.   Allergic/Immunologic: Positive for " environmental allergies.   Psychiatric/Behavioral: Positive for sleep disturbance. The patient is nervous/anxious.    All other systems reviewed and are negative.        Objective:    Physical Exam  Constitutional:       Appearance: Normal appearance.   HENT:      Head: Normocephalic and atraumatic.      Mouth/Throat:      Comments: Class 4 airway  Pulmonary:      Effort: Pulmonary effort is normal. No respiratory distress.   Neurological:      Mental Status: She is alert and oriented to person, place, and time.   Psychiatric:         Mood and Affect: Mood normal.         Behavior: Behavior normal.         Thought Content: Thought content normal.         Judgment: Judgment normal.       90/90 days of use  Greater than 4-hour use 100%  9% pressure 12.3  AHI of 2.2  Settings 10-18      ASSESSMENT/PLAN    Diagnoses and all orders for this visit:    1. NANCY (obstructive sleep apnea) (Primary)  -     CPAP Therapy            1. Counseled patient regarding multimodal approach with healthy nutrition, healthy sleep, regular physical activity, social activities, counseling, and medications. Encouraged to practice lateral  sleep position. Avoid alcohol and sedatives close to bedtime.  2.   Refill supplies x1 year.  Return to clinic 1 year or sooner symptoms warrant.  Patient gave verbal consent for video visit.  I have reviewed the results of my evaluation and impression and discussed my recommendations in detail with the patient.      Signed by  Ethel Denise, KATELYNN    September 24, 2021      CC: Lena Bush MD          No ref. provider found

## 2021-10-18 ENCOUNTER — TELEPHONE (OUTPATIENT)
Dept: SLEEP MEDICINE | Facility: HOSPITAL | Age: 71
End: 2021-10-18

## 2021-10-18 DIAGNOSIS — F41.8 ANXIETY ABOUT HEALTH: Primary | ICD-10-CM

## 2021-10-18 NOTE — TELEPHONE ENCOUNTER
PATIENT CALLED STATING THAT SHE IS WANTING A NEW ORDER FOR CPAP; STATES INSURANCE WILL PAY.   ALSO HAVING ISSUES WITH WAKING UP WITH HEADACHES AND ANXIETY.    REQUESTED A RETURN CALL [phone]

## 2021-10-19 DIAGNOSIS — G47.33 OSA (OBSTRUCTIVE SLEEP APNEA): Primary | ICD-10-CM

## 2021-10-20 RX ORDER — CLONAZEPAM 0.5 MG/1
TABLET ORAL
Qty: 30 TABLET | Refills: 0 | Status: SHIPPED | OUTPATIENT
Start: 2021-10-20 | End: 2022-01-25

## 2021-10-20 NOTE — TELEPHONE ENCOUNTER
I have spoken to patient.  Patient states she is awakening approximately every 2 hours.  I have asked her to please try melatonin extended release which she already has.  Patient states sleeping is not the problem it is anxiety related to her machine due to the Areli recall.  We have sent patient an order for a new machine as she has been told by her insurance that it will be covered.  She has not yet received this order to submit to her insurance company.  In the meantime I will send in a small dose of antianxiety medication to see if this will help.  She does understand this is a temporary medication until her new machine is received.

## 2022-01-25 ENCOUNTER — TELEMEDICINE (OUTPATIENT)
Dept: SLEEP MEDICINE | Facility: HOSPITAL | Age: 72
End: 2022-01-25

## 2022-01-25 VITALS — WEIGHT: 293 LBS | HEIGHT: 65 IN | BODY MASS INDEX: 48.82 KG/M2

## 2022-01-25 DIAGNOSIS — G47.33 OSA ON CPAP: Primary | ICD-10-CM

## 2022-01-25 DIAGNOSIS — F41.8 ANXIETY ABOUT HEALTH: ICD-10-CM

## 2022-01-25 DIAGNOSIS — Z99.89 OSA ON CPAP: Primary | ICD-10-CM

## 2022-01-25 PROCEDURE — 99213 OFFICE O/P EST LOW 20 MIN: CPT | Performed by: NURSE PRACTITIONER

## 2022-01-25 RX ORDER — HYDROXYZINE PAMOATE 25 MG/1
CAPSULE ORAL
Qty: 60 CAPSULE | Refills: 2 | Status: SHIPPED | OUTPATIENT
Start: 2022-01-25 | End: 2022-05-24 | Stop reason: SDUPTHER

## 2022-01-25 NOTE — PROGRESS NOTES
Chief Complaint:   Chief Complaint   Patient presents with   • Follow-up       HPI:    Romel Wilson is a 71 y.o. female here for follow-up of sleep apnea and anxiety.  Patient was last seen 1/21/2021.  Patient did have a Areli machine that have been recalled and is experiencing anxiety and states she found out last year.  She does state over the past month and a half that this has worsened.  We did try her on clonazepam and this was not efficacious.  She does sleep 6 to 7 hours nightly and will usually feel rested upon awakening.  She does go to sleep within 30 minutes.  She does awaken several times throughout the night due to anxiety attacks.  She has been trying extended release melatonin without relief.  She has an [place] score of 11/24.  Patient will continue use until the new 1 arrives and we will try low-dose Vistaril to help with her anxiety.  Patient wishes to move forward with this plan of care.        Current medications are:   Current Outpatient Medications:   •  bumetanide (BUMEX) 1 MG tablet, Daily., Disp: , Rfl:   •  cholecalciferol (VITAMIN D3) 25 MCG (1000 UT) tablet, Take 1,000 Units by mouth Daily., Disp: , Rfl:   •  furosemide (LASIX) 40 MG tablet, Take 40 mg by mouth 2 (Two) Times a Day., Disp: , Rfl:   •  hydrOXYzine pamoate (Vistaril) 25 MG capsule, 1-2 p.o. nightly as needed, Disp: 60 capsule, Rfl: 2  •  levothyroxine (SYNTHROID, LEVOTHROID) 125 MCG tablet, Daily., Disp: , Rfl:   •  rosuvastatin (CRESTOR) 20 MG tablet, Daily., Disp: , Rfl:   •  vitamin B-12 (CYANOCOBALAMIN) 100 MCG tablet, Daily., Disp: , Rfl:   •  vitamin C (ASCORBIC ACID) 250 MG tablet, Take 250 mg by mouth Daily., Disp: , Rfl: .      The patient's relevant past medical, surgical, family and social history were reviewed and updated in Epic as appropriate.       Review of Systems   Eyes: Positive for visual disturbance.   Respiratory: Positive for apnea.    Cardiovascular: Positive for palpitations and leg swelling.    Genitourinary: Positive for frequency.   Musculoskeletal: Positive for arthralgias, back pain, gait problem, joint swelling and myalgias.   Psychiatric/Behavioral: Positive for dysphoric mood and sleep disturbance. The patient is nervous/anxious.    All other systems reviewed and are negative.        Objective:    Physical Exam  Constitutional:       Appearance: Normal appearance.   HENT:      Head: Normocephalic and atraumatic.      Mouth/Throat:      Comments: CLASS 4 AIRWAY  Pulmonary:      Effort: Pulmonary effort is normal. No respiratory distress.   Neurological:      Mental Status: She is alert and oriented to person, place, and time.   Psychiatric:         Mood and Affect: Mood normal.         Behavior: Behavior normal.         Thought Content: Thought content normal.         Judgment: Judgment normal.         ASSESSMENT/PLAN    Diagnoses and all orders for this visit:    1. NANCY on CPAP (Primary)  -     CPAP Therapy    2. Anxiety about health  -     hydrOXYzine pamoate (Vistaril) 25 MG capsule; 1-2 p.o. nightly as needed  Dispense: 60 capsule; Refill: 2            1. Counseled patient regarding multimodal approach with healthy nutrition, healthy sleep, regular physical activity, social activities, counseling, and medications. Encouraged to practice lateral sleep position. Avoid alcohol and sedatives close to bedtime.  2.   Refill supplies x1 year.  Patient is continuing to wait on her new Areli machine.  DC clonazepam will start Vistaril 25 mg 1-2 at bedtime as needed I will see her back in 6 to 8 weeks to reassess efficacy.  Patient gave verbal consent today for video visit.  I have reviewed the results of my evaluation and impression and discussed my recommendations in detail with the patient.      Signed by  KATELYNN Caraballo    January 25, 2022      CC: Lena Bush MD          No ref. provider found

## 2022-03-11 ENCOUNTER — TELEMEDICINE (OUTPATIENT)
Dept: SLEEP MEDICINE | Facility: HOSPITAL | Age: 72
End: 2022-03-11

## 2022-03-11 VITALS — HEIGHT: 65 IN | WEIGHT: 293 LBS | BODY MASS INDEX: 48.82 KG/M2

## 2022-03-11 DIAGNOSIS — F51.04 PSYCHOPHYSIOLOGICAL INSOMNIA: Primary | ICD-10-CM

## 2022-03-11 DIAGNOSIS — G47.33 OSA (OBSTRUCTIVE SLEEP APNEA): ICD-10-CM

## 2022-03-11 PROCEDURE — 99213 OFFICE O/P EST LOW 20 MIN: CPT | Performed by: NURSE PRACTITIONER

## 2022-03-11 NOTE — PROGRESS NOTES
Chief Complaint:   Chief Complaint   Patient presents with   • Follow-up       HPI:    Romel Wilson is a 72 y.o. female here for follow-up of sleep apnea and insomnia.  Patient was last seen 1/25/2022.  Patient has hard time going to sleep in the past we did try extended release melatonin and clonazepam without relief.  On her last visit we did start her on Vistaril and patient states she is doing very well.  She does sleep 6 and 7 hours a night and go to sleep within 1 hour.  Patient states she does toss and turn at times but does not get up out of the bed.  Patient has an [place] score of 9/24.  Patient is continuing use of her Payne machine until her new 1 arrives.  She does benefit from CPAP and will continue therapy.        Current medications are:   Current Outpatient Medications:   •  bumetanide (BUMEX) 1 MG tablet, Daily., Disp: , Rfl:   •  cholecalciferol (VITAMIN D3) 25 MCG (1000 UT) tablet, Take 1,000 Units by mouth Daily., Disp: , Rfl:   •  furosemide (LASIX) 40 MG tablet, Take 40 mg by mouth 2 (Two) Times a Day., Disp: , Rfl:   •  hydrOXYzine pamoate (Vistaril) 25 MG capsule, 1-2 p.o. nightly as needed, Disp: 60 capsule, Rfl: 2  •  levothyroxine (SYNTHROID, LEVOTHROID) 125 MCG tablet, Daily., Disp: , Rfl:   •  rosuvastatin (CRESTOR) 20 MG tablet, Daily., Disp: , Rfl:   •  vitamin B-12 (CYANOCOBALAMIN) 100 MCG tablet, Daily., Disp: , Rfl:   •  vitamin C (ASCORBIC ACID) 250 MG tablet, Take 250 mg by mouth Daily., Disp: , Rfl: .      The patient's relevant past medical, surgical, family and social history were reviewed and updated in Epic as appropriate.       Review of Systems   Respiratory: Positive for apnea.    Cardiovascular: Positive for palpitations and leg swelling.   Psychiatric/Behavioral: Positive for sleep disturbance. The patient is nervous/anxious.    All other systems reviewed and are negative.        Objective:    Physical Exam  Constitutional:       Appearance: Normal appearance.    HENT:      Head: Normocephalic and atraumatic.      Mouth/Throat:      Comments: Class 4 airway  Pulmonary:      Effort: Pulmonary effort is normal. No respiratory distress.   Neurological:      Mental Status: She is alert and oriented to person, place, and time.   Psychiatric:         Mood and Affect: Mood normal.         Behavior: Behavior normal.         Thought Content: Thought content normal.         Judgment: Judgment normal.       90/90 days of use  Greater than 4-hour use 100%  90% pressure 11.0  AHI 1.6  Settings 10-8    ASSESSMENT/PLAN    Diagnoses and all orders for this visit:    1. Psychophysiological insomnia (Primary)    2. NANCY (obstructive sleep apnea)  -     PAP Therapy            1. Counseled patient regarding multimodal approach with healthy nutrition, healthy sleep, regular physical activity, social activities, counseling, and medications. Encouraged to practice lateral sleep position. Avoid alcohol and sedatives close to bedtime.  2. Continue Vistaril as needed  3. Refill supplies x1 year.  Return to clinic 1 year or sooner symptoms warrant.  4. Patient gave verbal consent for video visit.    I have reviewed the results of my evaluation and impression and discussed my recommendations in detail with the patient.      Signed by  KATELYNN Caraballo    March 11, 2022      CC: Lena Bush MD          No ref. provider found

## 2022-05-24 DIAGNOSIS — F41.8 ANXIETY ABOUT HEALTH: ICD-10-CM

## 2022-05-24 RX ORDER — HYDROXYZINE PAMOATE 25 MG/1
CAPSULE ORAL
Qty: 60 CAPSULE | Refills: 2 | OUTPATIENT
Start: 2022-05-24

## 2022-05-24 RX ORDER — HYDROXYZINE PAMOATE 25 MG/1
CAPSULE ORAL
Qty: 60 CAPSULE | Refills: 2 | Status: SHIPPED | OUTPATIENT
Start: 2022-05-24 | End: 2022-08-01

## 2022-08-01 ENCOUNTER — TELEPHONE (OUTPATIENT)
Dept: SLEEP MEDICINE | Facility: HOSPITAL | Age: 72
End: 2022-08-01

## 2022-08-01 DIAGNOSIS — F41.8 ANXIETY ABOUT HEALTH: ICD-10-CM

## 2022-08-01 RX ORDER — HYDROXYZINE PAMOATE 25 MG/1
CAPSULE ORAL
Qty: 60 CAPSULE | Refills: 2 | Status: SHIPPED | OUTPATIENT
Start: 2022-08-01 | End: 2022-10-31

## 2022-08-01 NOTE — TELEPHONE ENCOUNTER
PATIENT STATES SHE IS NOW ELIGIBLE FOR A NEW PAP MACHINE AND WOULD LIKE AN ORDER FOR A NEW MACHINE TO BE SENT TO [facility] IN [place].

## 2022-08-03 ENCOUNTER — OFFICE VISIT (OUTPATIENT)
Dept: SLEEP MEDICINE | Facility: HOSPITAL | Age: 72
End: 2022-08-03

## 2022-08-03 VITALS
HEIGHT: 65 IN | SYSTOLIC BLOOD PRESSURE: 123 MMHG | OXYGEN SATURATION: 84 % | DIASTOLIC BLOOD PRESSURE: 59 MMHG | HEART RATE: 76 BPM | BODY MASS INDEX: 48.82 KG/M2 | WEIGHT: 293 LBS

## 2022-08-03 DIAGNOSIS — G47.33 OSA (OBSTRUCTIVE SLEEP APNEA): Primary | ICD-10-CM

## 2022-08-03 PROCEDURE — 99213 OFFICE O/P EST LOW 20 MIN: CPT | Performed by: NURSE PRACTITIONER

## 2022-08-03 RX ORDER — ALLOPURINOL 100 MG/1
TABLET ORAL
COMMUNITY

## 2022-08-03 RX ORDER — POTASSIUM CHLORIDE 750 MG/1
TABLET, FILM COATED, EXTENDED RELEASE ORAL
COMMUNITY

## 2022-08-03 NOTE — PROGRESS NOTES
Sleep Clinic Follow Up Note    Chief Complaint  Follow-up    Subjective     History of Present Illness (from previous encounter of (3/11/2022):  Romel Wilson is a 72 y.o. female here for follow-up of sleep apnea and insomnia.  Patient was last seen 2022.  Patient has hard time going to sleep in the past we did try extended release melatonin and clonazepam without relief.  On her last visit we did start her on Vistaril and patient states she is doing very well.  She does sleep 6 and 7 hours a night and go to sleep within 1 hour.  Patient states she does toss and turn at times but does not get up out of the bed.  Patient has an [place] score of 9/24. Patient is continuing use of her Payne machine until her new 1 arrives.  She does benefit from CPAP and will continue therapy.  (End copied text)    Interval History:  Romel Wilson is a 72 y.o. female returns for follow up and compliance of CPAP therapy.  She requests an order for a new CPAP machine and was told she would be eligible this month.  The patient was last seen on 3/11/2020. Overall the patient feels good with regard to therapy. The device appears to be/does not working appropriately. On average the patient sleeps 4 hours per night. The patient wakes  times per night.       Further details are as follows:    [place] Scale is: 13/24      1. Sitting and Readin. Watching TV:  3. Sitting quietly in a public place:   4. As a passanger in a car for an hour:  5. Lying down in the afternoon to rest:  6. Sitting and talking to someone:  7. Sitting quietly after lunch without alcohol:  8. In a car while stopped in traffic for a few minutes:    Weight:  Current Weight:    Weight change in the last year:  gain: 16 lbs    The patient's relevant past medical, surgical, family, and social history reviewed and updated in Epic as appropriate.    PMH:    Past Medical History:   Diagnosis Date   • Arthritis    • Disease of thyroid gland    • Gout      Past Surgical  "History:   Procedure Laterality Date   • HYSTERECTOMY     • THYROIDECTOMY       OB History    No obstetric history on file.       No Known Allergies    MEDS:  Prior to Admission medications    Medication Sig Start Date End Date Taking? Authorizing Provider   bumetanide (BUMEX) 1 MG tablet Daily. 5/23/17   Patrick Barkley MD   cholecalciferol (VITAMIN D3) 25 MCG (1000 UT) tablet Take 1,000 Units by mouth Daily.    Patrick Barkley MD   furosemide (LASIX) 40 MG tablet Take 40 mg by mouth 2 (Two) Times a Day.    Patrick Barkley MD   hydrOXYzine pamoate (VISTARIL) 25 MG capsule TAKE 1 TO 2 CAPSULES NIGHTLY AS NEEDED 8/1/22   Ethel Denise APRN   levothyroxine (SYNTHROID, LEVOTHROID) 125 MCG tablet Daily. 3/27/17   Patrick Barkley MD   rosuvastatin (CRESTOR) 20 MG tablet Daily. 12/6/19   Patrick Barkley MD   vitamin B-12 (CYANOCOBALAMIN) 100 MCG tablet Daily. 4/1/17   Patrick Barkley MD   vitamin C (ASCORBIC ACID) 250 MG tablet Take 250 mg by mouth Daily.    Patrick Barkley MD         FH:  Family History   Problem Relation Age of Onset   • Hypertension Mother        Objective   Vital Signs:  /59   Pulse 76   Ht 165.1 cm (65\")   Wt (!) 145 kg (319 lb)   SpO2 (!) 84%   BMI 53.08 kg/m²           Physical Exam  Constitutional:       Appearance: Normal appearance.      Comments: BMI: 53.08   HENT:      Head: Normocephalic and atraumatic.      Nose: Nose normal.      Mouth/Throat:      Mouth: Mucous membranes are dry.   Cardiovascular:      Rate and Rhythm: Normal rate and regular rhythm.      Heart sounds: No murmur heard.    No friction rub. No gallop.   Pulmonary:      Effort: Pulmonary effort is normal. No respiratory distress.      Breath sounds: Normal breath sounds. No wheezing or rhonchi.   Neurological:      Mental Status: She is alert and oriented to person, place, and time.   Psychiatric:         Behavior: Behavior normal.         Mallampati Score: III (soft " and hard palate and base of uvula visible)    CPAP Report:  AHI: 2.1  Days of Usage: 30/30 (100%)  95th Percentile Pressure: 12 cm H2O  Number of Days Greater than 4 hours: 100%  Settings: Auto CPAP: Minimum pressure 10 cm H2O, maximum pressure 18 cm H2O, a flex-3, ramp linear-25 minutes, ramp pressure 4 cm H2O    Result Review :              Assessment and Plan  This is a 72-year-old female who returns requesting order for new CPAP machine.  Patient is waiting for replacement of her Areli machine.  She has excellent compliance and her AHI is 2.1.  Her usage averages 7 hours and 53 minutes nightly.  We discussed that if the machine is older than 5 years she would be eligible for replacement and that she would have to return in 31-90 days for compliance check.  If the machine is less than 5 years old this would be an out-of-pocket expense. She continues to have difficulty with anxiety related to her Payne recalled machine. She states she is eligible for a new machine now.  She may continue Vistaril for anxiety.    Diagnoses and all orders for this visit:    1. NANCY (obstructive sleep apnea) (Primary)  -     Cancel: PAP Therapy  -     PAP Therapy         The patient continues to use and benefit from CPAP therapy.    1. The patient was counseled regarding multimodal approach with healthy nutrition, healthy sleep, regular physical activity, social activities, counseling, and medications. Encouraged to practice lateral sleep position. Avoid alcohol and sedatives close to bedtime.     2.  We will refill supplies x1 year.  Return to clinic 1 year or sooner if symptoms warrant.  Patient gave verbal consent today for video visit. I have reviewed the results of my evaluation and impression and discussed my recommendations in detail with the patient.    Follow Up  Return in about 1 year (around 8/3/2023) for Annual visit, 31 to 90 days after PAP setup.  Patient was given instructions and counseling regarding her condition  or for health maintenance advice. Please see specific information pulled into the AVS if appropriate.       KATELYNN Simpson, [facility]-BC  Pulmonology, Critical Care, and Sleep Medicine  Electronically signed by KATELYNN García, 08/03/22, 7:52 AM EDT.

## 2022-10-31 DIAGNOSIS — F41.8 ANXIETY ABOUT HEALTH: ICD-10-CM

## 2022-10-31 RX ORDER — HYDROXYZINE PAMOATE 25 MG/1
CAPSULE ORAL
Qty: 60 CAPSULE | Refills: 2 | Status: SHIPPED | OUTPATIENT
Start: 2022-10-31

## 2022-11-16 NOTE — PROGRESS NOTES
Sleep Clinic Video Visit Follow Up Note    You have chosen to receive care through a telehealth visit.  Do you consent to use a video connection for your medical care today? Yes       Chief Complaint  Follow up for PAP compliance    Subjective     History of Present Illness (from previous encounter on 8/3/2022):  Romel Wilson is a 72 y.o. female returns for follow up and compliance of CPAP therapy.  She requests an order for a new CPAP machine and was told she would be eligible this month.  The patient was last seen on 3/11/2020. Overall the patient feels good with regard to therapy. The device appears to be/does not working appropriately. On average the patient sleeps 4 hours per night. The patient wakes times per night.  (End copied text)    -PAP therapy and supplies were ordered    Interval History:  Romel Wilson is a 72 y.o. female returns for follow up and compliance of CPAP therapy. The patient was last seen on 8/3/2022. Overall the patient feels good with regard to therapy. The device appears to be working appropriately. On average the patient sleeps 7  hours per night. The patient wake 2 times per night. She has more energy and feels improved.2    The patient reports the following changes to their medical and medication history since they were last seen: None      Further details are as follows:    [place] Scale is: 11/24      1. Sitting and Readin. Watching TV:  3. Sitting quietly in a public place:   4. As a passanger in a car for an hour:  5. Lying down in the afternoon to rest:  6. Sitting and talking to someone:  7. Sitting quietly after lunch without alcohol:  8. In a car while stopped in traffic for a few minutes:    Weight:  Current Weight: 309 lb    Weight change in the last year:  loss: 10 lbs    The patient's relevant past medical, surgical, family, and social history reviewed and updated in Epic as appropriate.    PMH:    Past Medical History:   Diagnosis Date   • Arthritis    • Disease of  thyroid gland    • Gout      Past Surgical History:   Procedure Laterality Date   • HYSTERECTOMY     • THYROIDECTOMY       OB History    No obstetric history on file.       No Known Allergies    MEDS:  Prior to Admission medications    Medication Sig Start Date End Date Taking? Authorizing Provider   hydrOXYzine pamoate (VISTARIL) 25 MG capsule TAKE 1 TO 2 CAPSULES NIGHTLY AS NEEDED 10/31/22   Ethel Denise APRN   allopurinol (ZYLOPRIM) 100 MG tablet     Patrick Barkley MD   bumetanide (BUMEX) 1 MG tablet Daily. 5/23/17   Patrick Barkley MD   cholecalciferol (VITAMIN D3) 25 MCG (1000 UT) tablet Take 1,000 Units by mouth Daily.    Patrick Barkley MD   furosemide (LASIX) 40 MG tablet Take 40 mg by mouth 2 (Two) Times a Day.    Patrick Barkley MD   levothyroxine (SYNTHROID, LEVOTHROID) 125 MCG tablet Daily. 3/27/17   Patrick Barkley MD   potassium chloride 10 MEQ CR tablet     Patrick Barkley MD   rosuvastatin (CRESTOR) 20 MG tablet Daily. 12/6/19   Patrick Barkley MD   vitamin B-12 (CYANOCOBALAMIN) 100 MCG tablet Daily. 4/1/17   Patrick Barkley MD   vitamin C (ASCORBIC ACID) 250 MG tablet Take 250 mg by mouth Daily.    Patrick Barkley MD         FH:  Family History   Problem Relation Age of Onset   • Hypertension Mother        Objective   Vital Signs:  Wt (!) 140 kg (309 lb)   BMI 51.42 kg/m²             Physical Exam  Constitutional:       General: She is not in acute distress.     Appearance: Normal appearance.   Neurological:      General: No focal deficit present.      Mental Status: She is oriented to person, place, and time.   Psychiatric:         Mood and Affect: Mood normal.         Behavior: Behavior normal.             CPAP Report:  AHI: 1.5/H  Days of Usage: 67/67 (100%)  95th Percentile Pressure: 12.9 cm H2O  Number of Days Greater than 4 hours: 67/67 (100%)  Average usage (days used): 8 hours 13 minutes  Settings: CPAP AutoSet-minimum pressure 10  cm H2O, maximum pressure 18 cm H2O, EPR full-time, EPR level 3, response standard.    Result Review :              Assessment and Plan  Romel Wilson is a 72 y.o. femalewho presents for follow-up and compliance of PAP therapy.  Patient is in full compliance with total and greater than 4-hour usage at 100%.  AHI is 1.5/H.  I will refill patient's supplies and she will return for follow-up compliance in 1 year or sooner should she have further questions or concerns.    Diagnoses and all orders for this visit:    1. Obstructive sleep apnea, adult (Primary)  -     PAP Therapy           The patient continues to use and benefit from CPAP therapy.    1. The patient was counseled regarding multimodal approach with healthy nutrition, healthy sleep, regular physical activity, social activities, counseling, and medications. Encouraged to practice lateral sleep position. Avoid alcohol and sedatives close to bedtime.     2.  We will refill supplies x1 year.  Return to clinic 1 year or sooner if symptoms warrant.  Patient gave verbal consent today for video visit. I have reviewed the results of my evaluation and impression and discussed my recommendations in detail with the patient.    Follow Up  Return in about 1 year (around 11/17/2023).  Patient was given instructions and counseling regarding her condition or for health maintenance advice. Please see specific information pulled into the AVS if appropriate.       KATELYNN Simpson, [facility]-BC  Pulmonology, Critical Care, and Sleep Medicine  Electronically signed by KATELYNN García, 11/16/22, 12:54 PM EST.

## 2022-11-17 ENCOUNTER — TELEMEDICINE (OUTPATIENT)
Dept: SLEEP MEDICINE | Facility: HOSPITAL | Age: 72
End: 2022-11-17

## 2022-11-17 VITALS — WEIGHT: 293 LBS | BODY MASS INDEX: 48.82 KG/M2 | HEIGHT: 65 IN

## 2022-11-17 DIAGNOSIS — G47.33 OBSTRUCTIVE SLEEP APNEA, ADULT: Primary | ICD-10-CM

## 2022-11-17 PROCEDURE — 99213 OFFICE O/P EST LOW 20 MIN: CPT | Performed by: NURSE PRACTITIONER

## 2023-06-27 ENCOUNTER — TELEPHONE (OUTPATIENT)
Dept: SLEEP MEDICINE | Facility: HOSPITAL | Age: 73
End: 2023-06-27

## 2023-06-27 NOTE — TELEPHONE ENCOUNTER
Provider: MARIA D  Caller: RAHUL MORALES  Relationship to Patient: SELF  Phone Number: [phone]  Reason for Call: SRUTHILAKESHIA WONT TAKE [employer] AT END OF THE MONTH. PT WANTED TO INFORM THAT [facility] WILL REACHING OUT TO GET THE ORDER TO COVER HER CPAP MACHINE. NEED IT DONE AS SOON AS POSSIBLE DUE TO HER MACHINE CUTTING OFF JUNE 30.

## 2023-08-06 DIAGNOSIS — F41.8 ANXIETY ABOUT HEALTH: ICD-10-CM

## 2023-08-07 RX ORDER — HYDROXYZINE PAMOATE 25 MG/1
CAPSULE ORAL
Qty: 60 CAPSULE | Refills: 2 | Status: SHIPPED | OUTPATIENT
Start: 2023-08-07

## 2023-08-21 ENCOUNTER — TRANSCRIBE ORDERS (OUTPATIENT)
Dept: ADMINISTRATIVE | Facility: HOSPITAL | Age: 73
End: 2023-08-21
Payer: MEDICARE

## 2023-08-21 DIAGNOSIS — F17.200 TOBACCO DEPENDENCE WITH CURRENT USE: Primary | ICD-10-CM

## 2023-09-07 ENCOUNTER — TRANSCRIBE ORDERS (OUTPATIENT)
Dept: ADMINISTRATIVE | Facility: HOSPITAL | Age: 73
End: 2023-09-07
Payer: MEDICARE

## 2023-09-07 DIAGNOSIS — F17.200 TOBACCO DEPENDENCE WITH CURRENT USE: Primary | ICD-10-CM

## 2023-10-08 ENCOUNTER — HOSPITAL ENCOUNTER (OUTPATIENT)
Dept: CT IMAGING | Facility: HOSPITAL | Age: 73
Discharge: HOME | End: 2023-10-08
Admitting: INTERNAL MEDICINE
Payer: MEDICARE

## 2023-10-08 DIAGNOSIS — F17.200 TOBACCO DEPENDENCE WITH CURRENT USE: ICD-10-CM

## 2023-10-08 PROCEDURE — 71271 CT THORAX LUNG CANCER SCR C-: CPT

## 2023-10-16 ENCOUNTER — TRANSCRIBE ORDERS (OUTPATIENT)
Dept: ADMINISTRATIVE | Facility: HOSPITAL | Age: 73
End: 2023-10-16
Payer: MEDICARE

## 2023-10-16 DIAGNOSIS — Z12.31 ENCOUNTER FOR SCREENING MAMMOGRAM FOR MALIGNANT NEOPLASM OF BREAST: Primary | ICD-10-CM

## 2023-11-17 ENCOUNTER — TELEMEDICINE (OUTPATIENT)
Dept: SLEEP MEDICINE | Facility: CLINIC | Age: 73
End: 2023-11-17
Payer: MEDICARE

## 2023-11-17 VITALS — BODY MASS INDEX: 48.82 KG/M2 | WEIGHT: 293 LBS | HEIGHT: 65 IN

## 2023-11-17 DIAGNOSIS — G47.33 OSA ON CPAP: Primary | ICD-10-CM

## 2023-11-17 NOTE — PROGRESS NOTES
Chief Complaint:   Chief Complaint   Patient presents with    Follow-up       HPI:    Romel Wilson is a 73 y.o. female here for follow-up of sleep apnea.  Patient was last seen 11/17/2022.  Patient states she is not doing well with CPAP therapy even though her numbers look good.  Patient states she is up and down all night moving her mask around as she is very claustrophobic.  Patient is currently using a fullface mask and we have spoken about using possibly a nasal patient would like to try this and I will get her order sent over to [facility] today.  She currently has an [place] score of 11/24.  Patient does understand if she is not comfortable with the nasal mask she will let me know otherwise we will continue therapy.        Current medications are:   Current Outpatient Medications:     bumetanide (BUMEX) 1 MG tablet, Daily., Disp: , Rfl:     cholecalciferol (VITAMIN D3) 25 MCG (1000 UT) tablet, Take 1,000 Units by mouth Daily., Disp: , Rfl:     hydrOXYzine pamoate (VISTARIL) 25 MG capsule, TAKE 1 TO 2 CAPSULES NIGHTLY AS NEEDED, Disp: 60 capsule, Rfl: 2    levothyroxine (SYNTHROID, LEVOTHROID) 125 MCG tablet, Daily., Disp: , Rfl:     potassium chloride 10 MEQ CR tablet, , Disp: , Rfl:     rosuvastatin (CRESTOR) 20 MG tablet, Daily., Disp: , Rfl:     vitamin B-12 (CYANOCOBALAMIN) 100 MCG tablet, Daily., Disp: , Rfl:     vitamin C (ASCORBIC ACID) 250 MG tablet, Take 250 mg by mouth Daily., Disp: , Rfl: .      The patient's relevant past medical, surgical, family and social history were reviewed and updated in Epic as appropriate.       Review of Systems   Respiratory:  Positive for apnea, cough and shortness of breath.    Cardiovascular:  Positive for leg swelling.   Endocrine: Positive for cold intolerance and heat intolerance.   Musculoskeletal:  Positive for arthralgias, back pain, gait problem, joint swelling and myalgias.   Allergic/Immunologic: Positive for environmental allergies.   All other systems reviewed  "and are negative.        Objective:    Physical Exam  HENT:      Mouth/Throat:      Comments: Class 4 airway  Pulmonary:      Effort: Pulmonary effort is normal. No respiratory distress.   Neurological:      Mental Status: She is alert and oriented to person, place, and time.   Psychiatric:         Mood and Affect: Mood normal.         Behavior: Behavior normal.         Thought Content: Thought content normal.         Judgment: Judgment normal.       Ht 165.1 cm (65\")   Wt (!) 150 kg (330 lb)   BMI 54.91 kg/m²     CPAP Report  27/30 days of use  Greater than 4-hour use 77%  Setting 10-18  AHI 0.8  95th percentile pressure 12.7    The patient continues to use and benefit from CPAP therapy.    ASSESSMENT/PLAN    Diagnoses and all orders for this visit:    1. NANCY on CPAP (Primary)  -     PAP Therapy        Counseled patient regarding multimodal approach with healthy nutrition, healthy sleep, regular physical activity, social activities, counseling, and medications. Encouraged to practice lateral sleep position. Avoid alcohol and sedatives close to bedtime.    Refill supplies x1 year.  Return to clinic 1 year sooner symptoms warrant.  Patient is in [place] but we are unable to make a secure connection and did do the visit on the telephone with her permission    I have reviewed the results of my evaluation and impression and discussed my recommendations in detail with the patient.      Signed by  KATELYNN Caraballo    November 17, 2023      CC: Lena Bush MD         No ref. provider found      "

## 2024-03-17 DIAGNOSIS — F41.8 ANXIETY ABOUT HEALTH: ICD-10-CM

## 2024-03-18 RX ORDER — HYDROXYZINE PAMOATE 25 MG/1
CAPSULE ORAL
Qty: 60 CAPSULE | Refills: 11 | Status: SHIPPED | OUTPATIENT
Start: 2024-03-18

## 2025-02-12 ENCOUNTER — TELEMEDICINE (OUTPATIENT)
Dept: SLEEP MEDICINE | Facility: CLINIC | Age: 75
End: 2025-02-12
Payer: MEDICARE

## 2025-02-12 VITALS — HEIGHT: 65 IN | WEIGHT: 293 LBS | BODY MASS INDEX: 48.82 KG/M2

## 2025-02-12 DIAGNOSIS — F51.04 PSYCHOPHYSIOLOGICAL INSOMNIA: ICD-10-CM

## 2025-02-12 DIAGNOSIS — G47.33 OSA (OBSTRUCTIVE SLEEP APNEA): Primary | ICD-10-CM

## 2025-02-12 NOTE — PROGRESS NOTES
Chief Complaint:   Chief Complaint   Patient presents with    Follow-up       HPI:    Romel Wilson is a 75 y.o. female here for follow-up of sleep apnea.  Patient was last seen 11/17/2023.  We did need her SIM card to DME and patient states she was unaware.  Patient states she is doing well with CPAP.  She was sleep 6-1/2 to 7 hours nightly.  She does get up x 1 for the restroom but it can take her several hours to go to sleep.  She does have a prescription for hydroxyzine 1 to 2 tablets but is only been taking 1 I have suggested she go up to 2 tablets.  She does do well when taking sleepy time tea and this could also be a good option for patient as needed.  She increasing hydroxyzine and certainly let me know if this is not helpful she will continue CPAP and I will see her back in 1 year.        Current medications are:   Current Outpatient Medications:     bumetanide (BUMEX) 1 MG tablet, Daily., Disp: , Rfl:     cholecalciferol (VITAMIN D3) 25 MCG (1000 UT) tablet, Take 1,000 Units by mouth Daily., Disp: , Rfl:     hydrOXYzine pamoate (VISTARIL) 25 MG capsule, TAKE 1 TO 2 CAPSULES NIGHTLY AS NEEDED, Disp: 60 capsule, Rfl: 11    levothyroxine (SYNTHROID, LEVOTHROID) 125 MCG tablet, Daily., Disp: , Rfl:     potassium chloride 10 MEQ CR tablet, , Disp: , Rfl:     rosuvastatin (CRESTOR) 20 MG tablet, Daily., Disp: , Rfl:     vitamin B-12 (CYANOCOBALAMIN) 100 MCG tablet, Daily., Disp: , Rfl:     vitamin C (ASCORBIC ACID) 250 MG tablet, Take 250 mg by mouth Daily., Disp: , Rfl: .      The patient's relevant past medical, surgical, family and social history were reviewed and updated in Epic as appropriate.       Review of Systems   Respiratory:  Positive for apnea, cough and shortness of breath.    Cardiovascular:  Positive for leg swelling.   Endocrine: Positive for cold intolerance and heat intolerance.   Musculoskeletal:  Positive for arthralgias, back pain, gait problem, joint swelling and myalgias.  "  Allergic/Immunologic: Positive for environmental allergies.   Psychiatric/Behavioral:  Positive for sleep disturbance.    All other systems reviewed and are negative.        Objective:    Physical Exam  Constitutional:       Appearance: Normal appearance.   HENT:      Head: Normocephalic and atraumatic.      Mouth/Throat:      Comments: Class 4 airway  Pulmonary:      Effort: Pulmonary effort is normal. No respiratory distress.   Neurological:      Mental Status: She is alert and oriented to person, place, and time.   Psychiatric:         Mood and Affect: Mood normal.         Behavior: Behavior normal.         Thought Content: Thought content normal.         Judgment: Judgment normal.       Ht 165.1 cm (65\")   Wt (!) 150 kg (330 lb)   BMI 54.91 kg/m²     CPAP Report  No download    The patient continues to use and benefit from CPAP therapy.    ASSESSMENT/PLAN    Diagnoses and all orders for this visit:    1. NANCY (obstructive sleep apnea) (Primary)  -     PAP Therapy    2. Psychophysiological insomnia      Patient states she is doing well with CPAP I will refill supplies x 1 year.  She will increase hydroxyzine to 2 tablets to see if this is helpful she will certainly let me know if it is not I will see her back in 1 year in office.    The patient is located in [facility] at home . The patient presents today for telehealth service.  This service was conducted via audio/video technology through a secure ivWatch video visit connection through Epic.  This provider is located in [facility].  Patient stated they are in a secure environment for the session.  Patient's condition being diagnosed/treated is appropriate for telemedicine.  The provider identified himself as well as his credentials.  The patient, and/or patient's guardian, consent to be seen remotely, and when consent is given they understanding that the consent allows for patient identifiable information to be sent to a third-party as needed.  " They may refuse to be seen remotely at any time.  The electronic data is encrypted and password protected, and the patient and/or guardian has been advised of the potential risk to privacy not withstanding such measures.  Patient identifiers used: Name and date of birth.   I have reviewed the results of my evaluation and impression and discussed my recommendations in detail with the patient.      Signed by  KATELYNN Caraballo    February 12, 2025      CC: Lena Bush MD         No ref. provider found

## 2025-02-28 ENCOUNTER — TRANSCRIBE ORDERS (OUTPATIENT)
Dept: ADMINISTRATIVE | Facility: HOSPITAL | Age: 75
End: 2025-02-28
Payer: MEDICARE

## 2025-02-28 DIAGNOSIS — I73.9 PVD (PERIPHERAL VASCULAR DISEASE): ICD-10-CM

## 2025-02-28 DIAGNOSIS — I87.2 CHRONIC VENOUS INSUFFICIENCY: Primary | ICD-10-CM

## 2025-02-28 DIAGNOSIS — R60.0 PERIPHERAL EDEMA: ICD-10-CM

## 2025-03-09 ENCOUNTER — HOSPITAL ENCOUNTER (INPATIENT)
Facility: HOSPITAL | Age: 75
LOS: 5 days | Discharge: NURSING, REHAB OR LONG-TERM CARE | DRG: 603 | End: 2025-03-15
Attending: EMERGENCY MEDICINE | Admitting: INTERNAL MEDICINE
Payer: MEDICARE

## 2025-03-09 ENCOUNTER — APPOINTMENT (OUTPATIENT)
Dept: CARDIOLOGY | Facility: HOSPITAL | Age: 75
DRG: 603 | End: 2025-03-09
Payer: MEDICARE

## 2025-03-09 ENCOUNTER — APPOINTMENT (OUTPATIENT)
Dept: GENERAL RADIOLOGY | Facility: HOSPITAL | Age: 75
DRG: 603 | End: 2025-03-09
Payer: MEDICARE

## 2025-03-09 DIAGNOSIS — M79.605 PAIN OF LEFT LOWER EXTREMITY: Primary | ICD-10-CM

## 2025-03-09 DIAGNOSIS — L03.116 CELLULITIS OF LEFT LOWER EXTREMITY: ICD-10-CM

## 2025-03-09 PROBLEM — E03.9 HYPOTHYROIDISM (ACQUIRED): Status: ACTIVE | Noted: 2025-03-09

## 2025-03-09 LAB
ALBUMIN SERPL-MCNC: 3.5 G/DL (ref 3.5–5.2)
ALBUMIN/GLOB SERPL: 1 G/DL
ALP SERPL-CCNC: 94 U/L (ref 39–117)
ALT SERPL W P-5'-P-CCNC: 9 U/L (ref 1–33)
ANION GAP SERPL CALCULATED.3IONS-SCNC: 11 MMOL/L (ref 5–15)
AST SERPL-CCNC: 17 U/L (ref 1–32)
BASOPHILS # BLD AUTO: 0.02 10*3/MM3 (ref 0–0.2)
BASOPHILS NFR BLD AUTO: 0.2 % (ref 0–1.5)
BH CV LOWER VASCULAR LEFT COMMON FEMORAL AUGMENT: NORMAL
BH CV LOWER VASCULAR LEFT COMMON FEMORAL COMPRESS: NORMAL
BH CV LOWER VASCULAR LEFT COMMON FEMORAL PHASIC: NORMAL
BH CV LOWER VASCULAR LEFT COMMON FEMORAL SPONT: NORMAL
BH CV LOWER VASCULAR LEFT DISTAL FEMORAL COMPRESS: NORMAL
BH CV LOWER VASCULAR LEFT GREATER SAPH AK COMPRESS: NORMAL
BH CV LOWER VASCULAR LEFT GREATER SAPH BK COMPRESS: NORMAL
BH CV LOWER VASCULAR LEFT LESSER SAPH COMPRESS: NORMAL
BH CV LOWER VASCULAR LEFT MID FEMORAL AUGMENT: NORMAL
BH CV LOWER VASCULAR LEFT MID FEMORAL COMPRESS: NORMAL
BH CV LOWER VASCULAR LEFT MID FEMORAL PHASIC: NORMAL
BH CV LOWER VASCULAR LEFT MID FEMORAL SPONT: NORMAL
BH CV LOWER VASCULAR LEFT POPLITEAL AUGMENT: NORMAL
BH CV LOWER VASCULAR LEFT POPLITEAL COMPRESS: NORMAL
BH CV LOWER VASCULAR LEFT POPLITEAL PHASIC: NORMAL
BH CV LOWER VASCULAR LEFT POPLITEAL SPONT: NORMAL
BH CV LOWER VASCULAR LEFT PROFUNDA FEMORAL AUGMENT: NORMAL
BH CV LOWER VASCULAR LEFT PROFUNDA FEMORAL COMPRESS: NORMAL
BH CV LOWER VASCULAR LEFT PROFUNDA FEMORAL SPONT: NORMAL
BH CV LOWER VASCULAR LEFT PROXIMAL FEMORAL COMPRESS: NORMAL
BH CV LOWER VASCULAR LEFT SAPHENOFEMORAL JUNCTION COMPRESS: NORMAL
BH CV LOWER VASCULAR RIGHT COMMON FEMORAL AUGMENT: NORMAL
BH CV LOWER VASCULAR RIGHT COMMON FEMORAL COMPRESS: NORMAL
BH CV LOWER VASCULAR RIGHT COMMON FEMORAL PHASIC: NORMAL
BH CV LOWER VASCULAR RIGHT COMMON FEMORAL SPONT: NORMAL
BH CV VAS PRELIMINARY FINDINGS SCRIPTING: 1
BILIRUB SERPL-MCNC: 0.5 MG/DL (ref 0–1.2)
BUN SERPL-MCNC: 10 MG/DL (ref 8–23)
BUN/CREAT SERPL: 9.5 (ref 7–25)
CALCIUM SPEC-SCNC: 9 MG/DL (ref 8.6–10.5)
CHLORIDE SERPL-SCNC: 101 MMOL/L (ref 98–107)
CO2 SERPL-SCNC: 28 MMOL/L (ref 22–29)
CREAT SERPL-MCNC: 1.05 MG/DL (ref 0.57–1)
CRP SERPL-MCNC: 1.3 MG/DL (ref 0–0.5)
D-LACTATE SERPL-SCNC: 1.3 MMOL/L (ref 0.5–2)
D-LACTATE SERPL-SCNC: 1.9 MMOL/L (ref 0.5–2)
DEPRECATED RDW RBC AUTO: 53.3 FL (ref 37–54)
EGFRCR SERPLBLD CKD-EPI 2021: 55.5 ML/MIN/1.73
EOSINOPHIL # BLD AUTO: 0.13 10*3/MM3 (ref 0–0.4)
EOSINOPHIL NFR BLD AUTO: 1.2 % (ref 0.3–6.2)
ERYTHROCYTE [DISTWIDTH] IN BLOOD BY AUTOMATED COUNT: 15.8 % (ref 12.3–15.4)
GEN 5 1HR TROPONIN T REFLEX: 17 NG/L
GLOBULIN UR ELPH-MCNC: 3.4 GM/DL
GLUCOSE SERPL-MCNC: 98 MG/DL (ref 65–99)
HCT VFR BLD AUTO: 38.7 % (ref 34–46.6)
HGB BLD-MCNC: 12.2 G/DL (ref 12–15.9)
IMM GRANULOCYTES # BLD AUTO: 0.04 10*3/MM3 (ref 0–0.05)
IMM GRANULOCYTES NFR BLD AUTO: 0.4 % (ref 0–0.5)
LYMPHOCYTES # BLD AUTO: 2.25 10*3/MM3 (ref 0.7–3.1)
LYMPHOCYTES NFR BLD AUTO: 20.1 % (ref 19.6–45.3)
MCH RBC QN AUTO: 29 PG (ref 26.6–33)
MCHC RBC AUTO-ENTMCNC: 31.5 G/DL (ref 31.5–35.7)
MCV RBC AUTO: 92.1 FL (ref 79–97)
MONOCYTES # BLD AUTO: 0.93 10*3/MM3 (ref 0.1–0.9)
MONOCYTES NFR BLD AUTO: 8.3 % (ref 5–12)
NEUTROPHILS NFR BLD AUTO: 69.8 % (ref 42.7–76)
NEUTROPHILS NFR BLD AUTO: 7.85 10*3/MM3 (ref 1.7–7)
NRBC BLD AUTO-RTO: 0 /100 WBC (ref 0–0.2)
NT-PROBNP SERPL-MCNC: 132.9 PG/ML (ref 0–1800)
PLATELET # BLD AUTO: 285 10*3/MM3 (ref 140–450)
PMV BLD AUTO: 9.6 FL (ref 6–12)
POTASSIUM SERPL-SCNC: 4.1 MMOL/L (ref 3.5–5.2)
PROCALCITONIN SERPL-MCNC: 0.04 NG/ML (ref 0–0.25)
PROT SERPL-MCNC: 6.9 G/DL (ref 6–8.5)
QT INTERVAL: 450 MS
QTC INTERVAL: 456 MS
RBC # BLD AUTO: 4.2 10*6/MM3 (ref 3.77–5.28)
SODIUM SERPL-SCNC: 140 MMOL/L (ref 136–145)
TROPONIN T NUMERIC DELTA: 5 NG/L
TROPONIN T SERPL HS-MCNC: 12 NG/L
TROPONIN T SERPL HS-MCNC: 13 NG/L
WBC NRBC COR # BLD AUTO: 11.22 10*3/MM3 (ref 3.4–10.8)

## 2025-03-09 PROCEDURE — 84145 PROCALCITONIN (PCT): CPT | Performed by: NURSE PRACTITIONER

## 2025-03-09 PROCEDURE — 83605 ASSAY OF LACTIC ACID: CPT | Performed by: NURSE PRACTITIONER

## 2025-03-09 PROCEDURE — 93971 EXTREMITY STUDY: CPT | Performed by: INTERNAL MEDICINE

## 2025-03-09 PROCEDURE — 83880 ASSAY OF NATRIURETIC PEPTIDE: CPT | Performed by: NURSE PRACTITIONER

## 2025-03-09 PROCEDURE — 85025 COMPLETE CBC W/AUTO DIFF WBC: CPT | Performed by: NURSE PRACTITIONER

## 2025-03-09 PROCEDURE — 25010000002 BUMETANIDE PER 0.5 MG: Performed by: HOSPITALIST

## 2025-03-09 PROCEDURE — 84484 ASSAY OF TROPONIN QUANT: CPT | Performed by: NURSE PRACTITIONER

## 2025-03-09 PROCEDURE — 99285 EMERGENCY DEPT VISIT HI MDM: CPT

## 2025-03-09 PROCEDURE — G0378 HOSPITAL OBSERVATION PER HR: HCPCS

## 2025-03-09 PROCEDURE — 80053 COMPREHEN METABOLIC PANEL: CPT | Performed by: NURSE PRACTITIONER

## 2025-03-09 PROCEDURE — 25010000002 ENOXAPARIN PER 10 MG: Performed by: HOSPITALIST

## 2025-03-09 PROCEDURE — 93971 EXTREMITY STUDY: CPT

## 2025-03-09 PROCEDURE — 87040 BLOOD CULTURE FOR BACTERIA: CPT | Performed by: NURSE PRACTITIONER

## 2025-03-09 PROCEDURE — 25810000003 SODIUM CHLORIDE 0.9 % SOLUTION: Performed by: HOSPITALIST

## 2025-03-09 PROCEDURE — 63710000001 ONDANSETRON ODT 4 MG TABLET DISPERSIBLE: Performed by: EMERGENCY MEDICINE

## 2025-03-09 PROCEDURE — 99222 1ST HOSP IP/OBS MODERATE 55: CPT | Performed by: HOSPITALIST

## 2025-03-09 PROCEDURE — 25010000002 CEFTRIAXONE PER 250 MG: Performed by: NURSE PRACTITIONER

## 2025-03-09 PROCEDURE — 25010000002 VANCOMYCIN 10 G RECONSTITUTED SOLUTION: Performed by: HOSPITALIST

## 2025-03-09 PROCEDURE — 86140 C-REACTIVE PROTEIN: CPT | Performed by: NURSE PRACTITIONER

## 2025-03-09 PROCEDURE — 94660 CPAP INITIATION&MGMT: CPT

## 2025-03-09 PROCEDURE — 93005 ELECTROCARDIOGRAM TRACING: CPT | Performed by: NURSE PRACTITIONER

## 2025-03-09 PROCEDURE — 25010000002 FUROSEMIDE PER 20 MG: Performed by: HOSPITALIST

## 2025-03-09 PROCEDURE — 71045 X-RAY EXAM CHEST 1 VIEW: CPT

## 2025-03-09 PROCEDURE — 36415 COLL VENOUS BLD VENIPUNCTURE: CPT

## 2025-03-09 RX ORDER — FUROSEMIDE 10 MG/ML
40 INJECTION INTRAMUSCULAR; INTRAVENOUS EVERY 12 HOURS
Status: DISCONTINUED | OUTPATIENT
Start: 2025-03-09 | End: 2025-03-11

## 2025-03-09 RX ORDER — ENOXAPARIN SODIUM 100 MG/ML
60 INJECTION SUBCUTANEOUS EVERY 12 HOURS
Status: DISCONTINUED | OUTPATIENT
Start: 2025-03-09 | End: 2025-03-15 | Stop reason: HOSPADM

## 2025-03-09 RX ORDER — ACETAMINOPHEN 160 MG/5ML
650 SOLUTION ORAL EVERY 4 HOURS PRN
Status: DISCONTINUED | OUTPATIENT
Start: 2025-03-09 | End: 2025-03-15 | Stop reason: HOSPADM

## 2025-03-09 RX ORDER — SODIUM CHLORIDE 0.9 % (FLUSH) 0.9 %
10 SYRINGE (ML) INJECTION AS NEEDED
Status: DISCONTINUED | OUTPATIENT
Start: 2025-03-09 | End: 2025-03-15 | Stop reason: HOSPADM

## 2025-03-09 RX ORDER — ROSUVASTATIN CALCIUM 20 MG/1
20 TABLET, COATED ORAL NIGHTLY
Status: DISCONTINUED | OUTPATIENT
Start: 2025-03-09 | End: 2025-03-15 | Stop reason: HOSPADM

## 2025-03-09 RX ORDER — ONDANSETRON 4 MG/1
4 TABLET, ORALLY DISINTEGRATING ORAL ONCE
Status: COMPLETED | OUTPATIENT
Start: 2025-03-09 | End: 2025-03-09

## 2025-03-09 RX ORDER — POLYETHYLENE GLYCOL 3350 17 G/17G
17 POWDER, FOR SOLUTION ORAL DAILY PRN
Status: DISCONTINUED | OUTPATIENT
Start: 2025-03-09 | End: 2025-03-12

## 2025-03-09 RX ORDER — SODIUM CHLORIDE 0.9 % (FLUSH) 0.9 %
10 SYRINGE (ML) INJECTION EVERY 12 HOURS SCHEDULED
Status: DISCONTINUED | OUTPATIENT
Start: 2025-03-09 | End: 2025-03-15 | Stop reason: HOSPADM

## 2025-03-09 RX ORDER — BUMETANIDE 0.25 MG/ML
2 INJECTION, SOLUTION INTRAMUSCULAR; INTRAVENOUS ONCE
Status: COMPLETED | OUTPATIENT
Start: 2025-03-09 | End: 2025-03-09

## 2025-03-09 RX ORDER — ACETAMINOPHEN 325 MG/1
650 TABLET ORAL EVERY 4 HOURS PRN
Status: DISCONTINUED | OUTPATIENT
Start: 2025-03-09 | End: 2025-03-15 | Stop reason: HOSPADM

## 2025-03-09 RX ORDER — ONDANSETRON 2 MG/ML
4 INJECTION INTRAMUSCULAR; INTRAVENOUS EVERY 6 HOURS PRN
Status: DISCONTINUED | OUTPATIENT
Start: 2025-03-09 | End: 2025-03-15 | Stop reason: HOSPADM

## 2025-03-09 RX ORDER — ONDANSETRON 4 MG/1
4 TABLET, ORALLY DISINTEGRATING ORAL EVERY 6 HOURS PRN
Status: DISCONTINUED | OUTPATIENT
Start: 2025-03-09 | End: 2025-03-15 | Stop reason: HOSPADM

## 2025-03-09 RX ORDER — LEVOTHYROXINE SODIUM 125 UG/1
125 TABLET ORAL
Status: DISCONTINUED | OUTPATIENT
Start: 2025-03-10 | End: 2025-03-12

## 2025-03-09 RX ORDER — ACETAMINOPHEN 650 MG/1
650 SUPPOSITORY RECTAL EVERY 4 HOURS PRN
Status: DISCONTINUED | OUTPATIENT
Start: 2025-03-09 | End: 2025-03-15 | Stop reason: HOSPADM

## 2025-03-09 RX ORDER — HYDROCODONE BITARTRATE AND ACETAMINOPHEN 5; 325 MG/1; MG/1
1 TABLET ORAL ONCE
Refills: 0 | Status: COMPLETED | OUTPATIENT
Start: 2025-03-09 | End: 2025-03-09

## 2025-03-09 RX ORDER — BISACODYL 10 MG
10 SUPPOSITORY, RECTAL RECTAL DAILY PRN
Status: DISCONTINUED | OUTPATIENT
Start: 2025-03-09 | End: 2025-03-12

## 2025-03-09 RX ORDER — GUAIFENESIN AND DEXTROMETHORPHAN HYDROBROMIDE 600; 30 MG/1; MG/1
1 TABLET, EXTENDED RELEASE ORAL EVERY 12 HOURS SCHEDULED
Status: DISCONTINUED | OUTPATIENT
Start: 2025-03-09 | End: 2025-03-15 | Stop reason: HOSPADM

## 2025-03-09 RX ORDER — SODIUM CHLORIDE 9 MG/ML
40 INJECTION, SOLUTION INTRAVENOUS AS NEEDED
Status: DISCONTINUED | OUTPATIENT
Start: 2025-03-09 | End: 2025-03-15 | Stop reason: HOSPADM

## 2025-03-09 RX ORDER — AMOXICILLIN 250 MG
2 CAPSULE ORAL 2 TIMES DAILY PRN
Status: DISCONTINUED | OUTPATIENT
Start: 2025-03-09 | End: 2025-03-12

## 2025-03-09 RX ORDER — BISACODYL 5 MG/1
5 TABLET, DELAYED RELEASE ORAL DAILY PRN
Status: DISCONTINUED | OUTPATIENT
Start: 2025-03-09 | End: 2025-03-12

## 2025-03-09 RX ADMIN — ENOXAPARIN SODIUM 60 MG: 60 INJECTION SUBCUTANEOUS at 17:18

## 2025-03-09 RX ADMIN — ROSUVASTATIN 20 MG: 20 TABLET, FILM COATED ORAL at 20:59

## 2025-03-09 RX ADMIN — CEFTRIAXONE SODIUM 2000 MG: 2 INJECTION, POWDER, FOR SOLUTION INTRAMUSCULAR; INTRAVENOUS at 16:00

## 2025-03-09 RX ADMIN — ONDANSETRON 4 MG: 4 TABLET, ORALLY DISINTEGRATING ORAL at 12:38

## 2025-03-09 RX ADMIN — HYDROCODONE BITARTRATE AND ACETAMINOPHEN 1 TABLET: 5; 325 TABLET ORAL at 12:38

## 2025-03-09 RX ADMIN — VANCOMYCIN HYDROCHLORIDE 3000 MG: 10 INJECTION, POWDER, LYOPHILIZED, FOR SOLUTION INTRAVENOUS at 17:17

## 2025-03-09 RX ADMIN — FUROSEMIDE 40 MG: 10 INJECTION, SOLUTION INTRAMUSCULAR; INTRAVENOUS at 20:59

## 2025-03-09 RX ADMIN — BUMETANIDE 2 MG: 0.25 INJECTION INTRAMUSCULAR; INTRAVENOUS at 17:20

## 2025-03-09 RX ADMIN — Medication 10 ML: at 20:59

## 2025-03-09 RX ADMIN — GUAIFENESIN AND DEXTROMETHORPHAN HYDROBROMIDE 1 TABLET: 600; 30 TABLET, EXTENDED RELEASE ORAL at 20:59

## 2025-03-09 NOTE — ED PROVIDER NOTES
EMERGENCY DEPARTMENT ENCOUNTER    Pt Name: Romel Wilson  MRN: [id number]  Pt :   1950  Room Number:  S584/1  Date of encounter:  3/9/2025  PCP: Lena Bush MD  ED Provider: KATELYNN Bravo    Historian: Patient, son      HPI:  Chief Complaint: Leg pain swelling        Context: Romel Wilson is a 75 y.o. female who presents to the ED c/o left lower extremity pain and swelling since 2024.  Reports worsening of discoloration and pain to the left lower extremity.  She was seen by her PCP on , since then she doubled Bumex, was taking anti-inflammatories, and elevating legs without relief.  She was seen again by primary care in February, awaiting several tests including ultrasound of left lower extremity, follow-up with ankle and foot specialist and cardiology.  She presented today because of worsening of the pain and discomfort to the leg.  Reports chronically mild shortness of breath, history of allergies, reports congestion and runny nose for the past few days.  No chest or abdominal pain.  She lives along at high-rise apartment.  She finds difficult to walk due to pain and tingling to her legs.  She cannot find appropriate site of compression stockings and was using Ace wrap for compression.  She was not able to get home health help due to insurance issues.  She continues to smoke but cutting down.  She is taking doxycycline and Omnicef for cellulitis for the past 3 days      PAST MEDICAL HISTORY  Past Medical History:   Diagnosis Date    Arthritis     Disease of thyroid gland     Gout          PAST SURGICAL HISTORY  Past Surgical History:   Procedure Laterality Date    HYSTERECTOMY      THYROIDECTOMY           FAMILY HISTORY  Family History   Problem Relation Age of Onset    Hypertension Mother          SOCIAL HISTORY  Social History     Socioeconomic History    Marital status: Single   Tobacco Use    Smoking status: Every Day     Current packs/day: 0.50     Types:  Cigarettes    Smokeless tobacco: Never   Vaping Use    Vaping status: Never Used   Substance and Sexual Activity    Alcohol use: No     Comment: once a year    Drug use: No         ALLERGIES  Patient has no known allergies.        REVIEW OF SYSTEMS  Review of Systems       All systems reviewed and negative except for those discussed in HPI.       PHYSICAL EXAM    I have reviewed the triage vital signs and nursing notes.    ED Triage Vitals   Temp Pulse Resp BP SpO2   -- -- -- -- --      Temp src Heart Rate Source Patient Position BP Location FiO2 (%)   -- -- -- -- --       Physical Exam  GENERAL:   Appears in no acute distress.  Obese  HENT: Nares patent.  EYES: No scleral icterus.  CV: Regular rhythm, regular rate.  Bilateral lower extremity edema, warmth, mild erythema to the left shin and calf.  Warm extremities with good cap refill.  Pedal pulses auscultated with Doppler  RESPIRATORY: Normal effort.  No audible wheezes, rales or rhonchi.  ABDOMEN: Soft, nontender  MUSCULOSKELETAL: No deformities.   NEURO: Alert, moves all extremities, follows commands.  SKIN: Warm, dry, no rash visualized.      LAB RESULTS  Recent Results (from the past 24 hours)   ECG 12 Lead Dyspnea    Collection Time: 03/09/25 10:09 AM   Result Value Ref Range    QT Interval 450 ms    QTC Interval 456 ms   Comprehensive Metabolic Panel    Collection Time: 03/09/25 10:14 AM    Specimen: Blood   Result Value Ref Range    Glucose 98 65 - 99 mg/dL    BUN 10 8 - 23 mg/dL    Creatinine 1.05 (H) 0.57 - 1.00 mg/dL    Sodium 140 136 - 145 mmol/L    Potassium 4.1 3.5 - 5.2 mmol/L    Chloride 101 98 - 107 mmol/L    CO2 28.0 22.0 - 29.0 mmol/L    Calcium 9.0 8.6 - 10.5 mg/dL    Total Protein 6.9 6.0 - 8.5 g/dL    Albumin 3.5 3.5 - 5.2 g/dL    ALT (SGPT) 9 1 - 33 U/L    AST (SGOT) 17 1 - 32 U/L    Alkaline Phosphatase 94 39 - 117 U/L    Total Bilirubin 0.5 0.0 - 1.2 mg/dL    Globulin 3.4 gm/dL    A/G Ratio 1.0 g/dL    BUN/Creatinine Ratio 9.5 7.0 - 25.0     Anion Gap 11.0 5.0 - 15.0 mmol/L    eGFR 55.5 (L) >60.0 mL/min/1.73   Lactic Acid, Plasma    Collection Time: 03/09/25 10:14 AM    Specimen: Blood   Result Value Ref Range    Lactate 1.3 0.5 - 2.0 mmol/L   Procalcitonin    Collection Time: 03/09/25 10:14 AM    Specimen: Blood   Result Value Ref Range    Procalcitonin 0.04 0.00 - 0.25 ng/mL   proBNP    Collection Time: 03/09/25 10:14 AM    Specimen: Blood   Result Value Ref Range    proBNP 132.9 0.0 - 1,800.0 pg/mL   C-reactive Protein    Collection Time: 03/09/25 10:14 AM    Specimen: Blood   Result Value Ref Range    C-Reactive Protein 1.30 (H) 0.00 - 0.50 mg/dL   CBC Auto Differential    Collection Time: 03/09/25 10:14 AM    Specimen: Blood   Result Value Ref Range    WBC 11.22 (H) 3.40 - 10.80 10*3/mm3    RBC 4.20 3.77 - 5.28 10*6/mm3    Hemoglobin 12.2 12.0 - 15.9 g/dL    Hematocrit 38.7 34.0 - 46.6 %    MCV 92.1 79.0 - 97.0 fL    MCH 29.0 26.6 - 33.0 pg    MCHC 31.5 31.5 - 35.7 g/dL    RDW 15.8 (H) 12.3 - 15.4 %    RDW-SD 53.3 37.0 - 54.0 fl    MPV 9.6 6.0 - 12.0 fL    Platelets 285 140 - 450 10*3/mm3    Neutrophil % 69.8 42.7 - 76.0 %    Lymphocyte % 20.1 19.6 - 45.3 %    Monocyte % 8.3 5.0 - 12.0 %    Eosinophil % 1.2 0.3 - 6.2 %    Basophil % 0.2 0.0 - 1.5 %    Immature Grans % 0.4 0.0 - 0.5 %    Neutrophils, Absolute 7.85 (H) 1.70 - 7.00 10*3/mm3    Lymphocytes, Absolute 2.25 0.70 - 3.10 10*3/mm3    Monocytes, Absolute 0.93 (H) 0.10 - 0.90 10*3/mm3    Eosinophils, Absolute 0.13 0.00 - 0.40 10*3/mm3    Basophils, Absolute 0.02 0.00 - 0.20 10*3/mm3    Immature Grans, Absolute 0.04 0.00 - 0.05 10*3/mm3    nRBC 0.0 0.0 - 0.2 /100 WBC   High Sensitivity Troponin T    Collection Time: 03/09/25 10:14 AM    Specimen: Blood   Result Value Ref Range    HS Troponin T 12 <14 ng/L   High Sensitivity Troponin T 1Hr    Collection Time: 03/09/25 11:22 AM    Specimen: Blood   Result Value Ref Range    HS Troponin T 17 (H) <14 ng/L    Troponin T Numeric Delta 5 (C)  Abnormal if >/=3 ng/L   Duplex Venous Lower Extremity - Left    Collection Time: 03/09/25 12:36 PM   Result Value Ref Range    Right Common Femoral Spont Y     Right Common Femoral Phasic Y     Right Common Femoral Compress C     Right Common Femoral Augment Y     Left Common Femoral Spont Y     Left Common Femoral Phasic Y     Left Common Femoral Compress C     Left Common Femoral Augment Y     Left Saphenofemoral Junction Compress C     Left Profunda Femoral Spont Y     Left Profunda Femoral Compress C     Left Profunda Femoral Augment Y     Left Proximal Femoral Compress C     Left Mid Femoral Spont Y     Left Mid Femoral Phasic Y     Left Mid Femoral Compress C     Left Mid Femoral Augment Y     Left Distal Femoral Compress C     Left Popliteal Spont Y     Left Popliteal Phasic Y     Left Popliteal Compress C     Left Popliteal Augment Y     Left Greater Saph AK Compress C     Left Greater Saph BK Compress C     Left Lesser Saph Compress C     BH CV VAS PRELIMINARY FINDINGS SCRIPTING 1.0    High Sensitivity Troponin T    Collection Time: 03/09/25 12:54 PM    Specimen: Blood   Result Value Ref Range    HS Troponin T 13 <14 ng/L   Lactic Acid, Plasma    Collection Time: 03/09/25  3:35 PM    Specimen: Blood   Result Value Ref Range    Lactate 1.9 0.5 - 2.0 mmol/L   Urinalysis With Microscopic If Indicated (No Culture) - Urine, Clean Catch    Collection Time: 03/10/25 12:51 AM    Specimen: Urine, Clean Catch   Result Value Ref Range    Color, UA Yellow Yellow, Straw    Appearance, UA Clear Clear    pH, UA 7.5 5.0 - 8.0    Specific Gravity, UA 1.008 1.001 - 1.030    Glucose, UA Negative Negative    Ketones, UA Negative Negative    Bilirubin, UA Negative Negative    Blood, UA Negative Negative    Protein, UA Negative Negative    Leuk Esterase, UA Negative Negative    Nitrite, UA Negative Negative    Urobilinogen, UA 0.2 E.U./dL 0.2 - 1.0 E.U./dL   MRSA Screen, PCR (Inpatient) - Swab, Nares    Collection Time: 03/10/25  12:52 AM    Specimen: Nares; Swab   Result Value Ref Range    MRSA PCR Negative Negative   CBC Auto Differential    Collection Time: 03/10/25  4:30 AM    Specimen: Blood   Result Value Ref Range    WBC 11.25 (H) 3.40 - 10.80 10*3/mm3    RBC 4.17 3.77 - 5.28 10*6/mm3    Hemoglobin 12.1 12.0 - 15.9 g/dL    Hematocrit 39.0 34.0 - 46.6 %    MCV 93.5 79.0 - 97.0 fL    MCH 29.0 26.6 - 33.0 pg    MCHC 31.0 (L) 31.5 - 35.7 g/dL    RDW 15.6 (H) 12.3 - 15.4 %    RDW-SD 53.8 37.0 - 54.0 fl    MPV 9.4 6.0 - 12.0 fL    Platelets 273 140 - 450 10*3/mm3    Neutrophil % 68.9 42.7 - 76.0 %    Lymphocyte % 21.5 19.6 - 45.3 %    Monocyte % 7.7 5.0 - 12.0 %    Eosinophil % 1.2 0.3 - 6.2 %    Basophil % 0.3 0.0 - 1.5 %    Immature Grans % 0.4 0.0 - 0.5 %    Neutrophils, Absolute 7.74 (H) 1.70 - 7.00 10*3/mm3    Lymphocytes, Absolute 2.42 0.70 - 3.10 10*3/mm3    Monocytes, Absolute 0.87 0.10 - 0.90 10*3/mm3    Eosinophils, Absolute 0.14 0.00 - 0.40 10*3/mm3    Basophils, Absolute 0.03 0.00 - 0.20 10*3/mm3    Immature Grans, Absolute 0.05 0.00 - 0.05 10*3/mm3    nRBC 0.0 0.0 - 0.2 /100 WBC   Comprehensive Metabolic Panel    Collection Time: 03/10/25  4:30 AM    Specimen: Blood   Result Value Ref Range    Glucose 93 65 - 99 mg/dL    BUN 10 8 - 23 mg/dL    Creatinine 1.09 (H) 0.57 - 1.00 mg/dL    Sodium 142 136 - 145 mmol/L    Potassium 3.6 3.5 - 5.2 mmol/L    Chloride 100 98 - 107 mmol/L    CO2 30.0 (H) 22.0 - 29.0 mmol/L    Calcium 8.7 8.6 - 10.5 mg/dL    Total Protein 6.2 6.0 - 8.5 g/dL    Albumin 3.5 3.5 - 5.2 g/dL    ALT (SGPT) 9 1 - 33 U/L    AST (SGOT) 16 1 - 32 U/L    Alkaline Phosphatase 96 39 - 117 U/L    Total Bilirubin 0.4 0.0 - 1.2 mg/dL    Globulin 2.7 gm/dL    A/G Ratio 1.3 g/dL    BUN/Creatinine Ratio 9.2 7.0 - 25.0    Anion Gap 12.0 5.0 - 15.0 mmol/L    eGFR 53.1 (L) >60.0 mL/min/1.73   Magnesium    Collection Time: 03/10/25  4:30 AM    Specimen: Blood   Result Value Ref Range    Magnesium 1.9 1.6 - 2.4 mg/dL   TSH     Collection Time: 03/10/25  4:30 AM    Specimen: Blood   Result Value Ref Range    TSH 4.510 (H) 0.270 - 4.200 uIU/mL   Adult Transthoracic Echo Complete W/ Cont if Necessary Per Protocol    Collection Time: 03/10/25  8:57 AM   Result Value Ref Range    LVOT area 3.5 cm2    LVOT diam 2.10 cm    EDV(MOD-sp2) 62.1 ml    EDV(MOD-sp4) 63.6 ml    ESV(MOD-sp2) 20.3 ml    ESV(MOD-sp4) 21.2 ml    SV(MOD-sp2) 41.8 ml    SV(MOD-sp4) 42.4 ml    EF(MOD-sp2) 67.3 %    EF(MOD-sp4) 66.7 %    MV E max jefry 68.6 cm/sec    MV A max jefry 89.1 cm/sec    MV E/A 0.77     Med Peak E' Jefry 14.3 cm/sec    Lat Peak E' Jefry 10.5 cm/sec    Avg E/e' ratio 5.53     SV(LVOT) 71.2 ml    RV S' 16.2 cm/sec    LV V1 max 101.4 cm/sec    LV V1 max PG 4.3 mmHg    LV V1 mean PG 1.97 mmHg    LV V1 VTI 20.6 cm    Ao pk jefry 151.4 cm/sec    Ao max PG 9.2 mmHg    Ao mean PG 4.6 mmHg    Ao V2 VTI 25.6 cm    NELLY(I,D) 2.8 cm2    MV max PG 4.1 mmHg    MV mean PG 1.50 mmHg    MV V2 VTI 23.2 cm    MV P1/2t 69.0 msec    MVA(VTI) 3.1 cm2    MV dec slope 314.6 cm/sec2    PA V2 max 87.5 cm/sec    Ao root diam 3.6 cm    EF(MOD-bp) 66.7 %    LA ESV Index (BP) 17.0 ml/m2    AI P1/2t 680 msec    BH CV VAS BP LEFT /55 mmHg       If labs were ordered, I independently reviewed the results and considered them in treating the patient.        RADIOLOGY  Duplex Venous Lower Extremity - Left  Result Date: 3/9/2025    No evidence of deep or superficial venous thrombus in the left lower extremity.  The calf veins were difficult to visualize but no apparent thrombus.     XR Chest 1 View  Result Date: 3/9/2025  XR CHEST 1 VW Date of Exam: 3/9/2025 10:30 AM EDT Indication: cough Comparison: Low-dose chest CT [address] 23 Findings: Examination limited by body habitus. Cardiomediastinal silhouette is unremarkable. There is been a sternotomy. There is generalized interstitial prominence, similar to previous CT given differences in modality. No airspace disease, pneumothorax, nor pleural  effusion. No acute osseous abnormality identified.     Impression: 1.No acute process identified Electronically Signed: Dwayne Shaw MD  3/9/2025 10:38 AM EDT  Workstation ID: [id number]      I ordered and independently reviewed the above noted radiographic studies.      I viewed images of chest x-ray which showed no lobar pneumonia per my independent interpretation.    See radiologist's dictation for official interpretation.        PROCEDURES    Procedures    ECG 12 Lead Dyspnea   Final Result   Test Reason : Dyspnea   Blood Pressure :   */*   mmHG   Vent. Rate :  62 BPM     Atrial Rate :  62 BPM      P-R Int : 160 ms          QRS Dur :  86 ms       QT Int : 450 ms       P-R-T Axes :  47  41  41 degrees     QTcB Int : 456 ms      Normal sinus rhythm   T wave abnormality, consider anterior ischemia   Abnormal ECG   No previous ECGs available   Confirmed by DWAYNE ZELAYA MD ([id number]) on 3/9/2025 3:47:02 PM      Referred By: EDMD           Confirmed By: DWAYNE ZELAYA MD          MEDICATIONS GIVEN IN ER    Medications   sodium chloride 0.9 % flush 10 mL (has no administration in time range)   levothyroxine (SYNTHROID, LEVOTHROID) tablet 125 mcg (125 mcg Oral Given 3/10/25 0521)   rosuvastatin (CRESTOR) tablet 20 mg (20 mg Oral Given 3/9/25 2059)   sodium chloride 0.9 % flush 10 mL (10 mL Intravenous Given 3/9/25 2059)   sodium chloride 0.9 % flush 10 mL (has no administration in time range)   sodium chloride 0.9 % infusion 40 mL (has no administration in time range)   enoxaparin sodium (LOVENOX) syringe 60 mg (60 mg Subcutaneous Given 3/10/25 0521)   Potassium Replacement - Follow Nurse / BPA Driven Protocol (has no administration in time range)   Magnesium Low Dose Replacement - Follow Nurse / BPA Driven Protocol (has no administration in time range)   Phosphorus Replacement - Follow Nurse / BPA Driven Protocol (has no administration in time range)   Calcium Replacement - Follow Nurse / BPA Driven Protocol (has no  administration in time range)   acetaminophen (TYLENOL) tablet 650 mg (has no administration in time range)     Or   acetaminophen (TYLENOL) 160 MG/5ML oral solution 650 mg (has no administration in time range)     Or   acetaminophen (TYLENOL) suppository 650 mg (has no administration in time range)   sennosides-docusate (PERICOLACE) 8.6-50 MG per tablet 2 tablet (has no administration in time range)     And   polyethylene glycol (MIRALAX) packet 17 g (has no administration in time range)     And   bisacodyl (DULCOLAX) EC tablet 5 mg (has no administration in time range)     And   bisacodyl (DULCOLAX) suppository 10 mg (has no administration in time range)   ondansetron ODT (ZOFRAN-ODT) disintegrating tablet 4 mg (has no administration in time range)     Or   ondansetron (ZOFRAN) injection 4 mg (has no administration in time range)   guaifenesin-dextromethorphan 600-30 mg (MUCINEX DM) 1 tablet (1 tablet Oral Given 3/10/25 0854)   cefTRIAXone (ROCEPHIN) 2,000 mg in sodium chloride 0.9 % 100 mL MBP (2,000 mg Intravenous New Bag 3/10/25 0855)   furosemide (LASIX) injection 40 mg (40 mg Intravenous Given 3/10/25 0701)   potassium chloride (KLOR-CON M20) CR tablet 40 mEq (40 mEq Oral Given 3/10/25 0701)   HYDROcodone-acetaminophen (NORCO) 5-325 MG per tablet 1 tablet (1 tablet Oral Given 3/9/25 1238)   ondansetron ODT (ZOFRAN-ODT) disintegrating tablet 4 mg (4 mg Oral Given 3/9/25 1238)   cefTRIAXone (ROCEPHIN) 2,000 mg in sodium chloride 0.9 % 100 mL MBP (2,000 mg Intravenous New Bag 3/9/25 1600)   vancomycin 3000 mg/500 mL 0.9% NS IVPB ([facility]) (3,000 mg Intravenous New Bag 3/9/25 1717)   bumetanide (BUMEX) injection 2 mg (2 mg Intravenous Given 3/9/25 1720)         MEDICAL DECISION MAKING, PROGRESS, and CONSULTS    All labs, if obtained, have been independently reviewed by me.  All radiology studies, if obtained, have been reviewed by me and the radiologist dictating the report.  All EKG's, if obtained, have been  independently viewed and interpreted by me/my attending physician.      Discussion below represents my analysis of pertinent findings related to patient's condition, differential diagnosis, treatment plan and final disposition.  Patient is 75-year-old female who presented for evaluation of worsening of lower extremity pain and swelling especially to the left leg.  On physical exam she was nontoxic-appearing, obese, skin discoloration, mild erythema and warmth noted to the extremity.  Lab work was significant for mild leukocytosis 11.22, normal lactate, proBNP and procalcitonin, nearly normal CRP.  Duplex venous lower extremity was negative for DVT or SVT, chest x-ray no acute process.  Patient and her son were both concerned with not improvement with current medication treatment.  She lives alone and has difficulty getting to her apartment or walking.  Will consulted hospitalist Dr. Hoover for admission.  She will be admitted for observation.  Gigi Diop pharmacist consulted for antibiotic selection, advised Rocephin 2 g due to patient's size and vancomycin for cellulitis coverage.                       Differential diagnosis:    Lymphedema, venous insufficiency, cellulitis, DVT, SVT, CHF      Additional sources:    - Discussed/ obtained information from independent historians: Son    - External (non-ED) record review: 2/12/2025, telemedicine follow-up for NANCY    - Chronic or social conditions impacting care: Obesity, smoking    - Shared decision making: Patient      Orders placed during this visit:  Orders Placed This Encounter   Procedures    MRSA Screen, PCR (Inpatient) - Swab, Nares    Blood Culture - Blood,    Blood Culture - Blood,    XR Chest 1 View    Comprehensive Metabolic Panel    Lactic Acid, Plasma    Procalcitonin    proBNP    C-reactive Protein    CBC Auto Differential    High Sensitivity Troponin T    High Sensitivity Troponin T 1Hr    High Sensitivity Troponin T    Lactic Acid, Plasma    CBC  Auto Differential    Comprehensive Metabolic Panel    Magnesium    TSH    Urinalysis With Microscopic If Indicated (No Culture) - Urine, Clean Catch    Microalbumin / Creatinine Urine Ratio - Urine, Clean Catch    Potassium    Diet: Cardiac; Healthy Heart (2-3 Na+); Fluid Consistency: Thin (IDDSI 0)    Monitor Blood Pressure    Please auscultate and ricky distal pulses BLE  Misc Nursing Order (Specify)    Vital Signs    Intake & Output    Weigh Patient    Oral Care    Saline Lock & Maintain IV Access    Place Sequential Compression Device    Maintain Sequential Compression Device    Activity - Ad Beverly    Turn Patient    Compression - Not for VTE Prophylaxis    Strict Intake & Output    Inpatient Spiritual Care Consult    DIET MESSAGE Please send  special for dinner with a sprite    DIET MESSAGE Breakfast- 's special 1, no eggs, apple sauce, coffee Lunch- Broccoli cheddar soup, sprite Dinner- Braised beef tips, sprite    OT Consult: Eval & Treat ADL Performance Below Baseline    PT Consult: Eval & Treat Functional Mobility Below Baseline    Oxygen Therapy- Nasal Cannula; Titrate 1-6 LPM Per SpO2; 90 - 95%    NIPPV (CPAP or BIPAP)    ECG 12 Lead Dyspnea    Adult Transthoracic Echo Complete W/ Cont if Necessary Per Protocol    Insert Peripheral IV    Insert Peripheral IV    Initiate Observation Status    ED Bed Request    CBC & Differential         Additional orders considered but not ordered:      ED Course:    Consultants:      ED Course as of 03/10/25 0909   Sun Mar 09, 2025   1242 No obvious DVT or SVT on left lower extremity ultrasound [IR]      ED Course User Index  [IR] Lashae Oropeza, KATELYNN              Shared Decision Making:  After my consideration of clinical presentation and any laboratory/radiology studies obtained, I discussed the findings with the patient/patient representative who is in agreement with the treatment plan and the final disposition.   Risks and benefits of discharge and/or  observation/admission were discussed.       AS OF 09:09 EDT VITALS:    BP - 99/81  HR - 78  TEMP - 97.8 °F (36.6 °C) (Oral)  O2 SATS - 91%                  DIAGNOSIS  Final diagnoses:   Pain of left lower extremity   Cellulitis of left lower extremity         DISPOSITION  admit      Please note that portions of this document were completed with voice recognition software.     KATELYNN Bravo   03/10/25   09:09 EDT        Lashae Oropeza, KATELYNN  03/09/25 1555       Lashae Oropeza APRN  03/10/25 0909

## 2025-03-09 NOTE — ED NOTES
Romel Wilson    Nursing Report ED to Floor:  Mental status: A&O x 4  Ambulatory status: Bed Bound  Oxygen Therapy:  RA  Cardiac Rhythm: NSR  Admitted from: ER  Safety Concerns:  None  Precautions: None  Social Issues: None  ED Room #:  7    ED Nurse Phone Extension - [phone] or may call [phone].      HPI:   Chief Complaint   Patient presents with    Leg Swelling       Past Medical History:  Past Medical History:   Diagnosis Date    Arthritis     Disease of thyroid gland     Gout         Past Surgical History:  Past Surgical History:   Procedure Laterality Date    HYSTERECTOMY      THYROIDECTOMY          Admitting Doctor:   Lisandro Godoy MD    Consulting Provider(s):  Consults       No orders found from 2/8/2025 to 3/10/2025.             Admitting Diagnosis:   The primary encounter diagnosis was Pain of left lower extremity. A diagnosis of Cellulitis of left lower extremity was also pertinent to this visit.    Most Recent Vitals:   Vitals:    03/09/25 1230 03/09/25 1330 03/09/25 1400 03/09/25 1430   BP: 119/53 104/69 110/95 129/56   BP Location:       Patient Position:       Pulse: 64 68 65 69   Resp:       Temp:       TempSrc:       SpO2: 95% 94% 93% 93%   Weight:       Height:           Active LDAs/IV Access:   Lines, Drains & Airways       Active LDAs       Name Placement date Placement time Site Days    Peripheral IV 03/09/25 1013 Left Antecubital 03/09/25  1013  Antecubital  less than 1                    Labs (abnormal labs have a star):   Labs Reviewed   COMPREHENSIVE METABOLIC PANEL - Abnormal; Notable for the following components:       Result Value    Creatinine 1.05 (*)     eGFR 55.5 (*)     All other components within normal limits    Narrative:     GFR Categories in Chronic Kidney Disease (CKD)      GFR Category          GFR (mL/min/1.73)    Interpretation  G1                     90 or greater         Normal or high (1)  G2                      60-89                Mild decrease (1)  G3a                    45-59                Mild to moderate decrease  G3b                   30-44                Moderate to severe decrease  G4                    15-29                Severe decrease  G5                    14 or less           Kidney failure          (1)In the absence of evidence of kidney disease, neither GFR category G1 or G2 fulfill the criteria for CKD.    eGFR calculation 2021 CKD-EPI creatinine equation, which does not include race as a factor   C-REACTIVE PROTEIN - Abnormal; Notable for the following components:    C-Reactive Protein 1.30 (*)     All other components within normal limits   CBC WITH AUTO DIFFERENTIAL - Abnormal; Notable for the following components:    WBC 11.22 (*)     RDW 15.8 (*)     Neutrophils, Absolute 7.85 (*)     Monocytes, Absolute 0.93 (*)     All other components within normal limits   HIGH SENSITIVITIY TROPONIN T 1HR - Abnormal; Notable for the following components:    HS Troponin T 17 (*)     Troponin T Numeric Delta 5 (*)     All other components within normal limits    Narrative:     High Sensitive Troponin T Reference Range:  <14.0 ng/L- Negative Female for AMI  <22.0 ng/L- Negative Male for AMI  >=14 - Abnormal Female indicating possible myocardial injury.  >=22 - Abnormal Male indicating possible myocardial injury.   Clinicians would have to utilize clinical acumen, EKG, Troponin, and serial changes to determine if it is an Acute Myocardial Infarction or myocardial injury due to an underlying chronic condition.        LACTIC ACID, PLASMA - Normal   PROCALCITONIN - Normal    Narrative:     As a Marker for Sepsis (Non-Neonates):    1. <0.5 ng/mL represents a low risk of severe sepsis and/or septic shock.  2. >2 ng/mL represents a high risk of severe sepsis and/or septic shock.    As a Marker for Lower Respiratory Tract Infections that require antibiotic therapy:    PCT on Admission    Antibiotic Therapy       6-12 Hrs later    >0.5                Strongly Recommended  >0.25 - <0.5   "      Recommended   0.1 - 0.25          Discouraged              Remeasure/reassess PCT  <0.1                Strongly Discouraged     Remeasure/reassess PCT    As 28 day mortality risk marker: \"Change in Procalcitonin Result\" (>80% or <=80%) if Day 0 (or Day 1) and Day 4 values are available. Refer to [email or web address]    Change in PCT <=80%  A decrease of PCT levels below or equal to 80% defines a positive change in PCT test result representing a higher risk for 28-day all-cause mortality of patients diagnosed with severe sepsis for septic shock.    Change in PCT >80%  A decrease of PCT levels of more than 80% defines a negative change in PCT result representing a lower risk for 28-day all-cause mortality of patients diagnosed with severe sepsis or septic shock.      PROBNP (REFERENCE) - Normal    Narrative:     This assay is used as an aid in the diagnosis of individuals suspected of having heart failure. It can be used as an aid in the diagnosis of acute decompensated heart failure (ADHF) in patients presenting with signs and symptoms of ADHF to the emergency department (ED). In addition, NT-proBNP of <300 pg/mL indicates ADHF is not likely.    Age Range Result Interpretation  NT-proBNP Concentration (pg/mL:      <50             Positive            >450                   Gray                 300-450                    Negative             <300    50-75           Positive            >900                  Gray                300-900                  Negative            <300      >75             Positive            >1800                  Gray                300-1800                  Negative            <300   TROPONIN - Normal    Narrative:     High Sensitive Troponin T Reference Range:  <14.0 ng/L- Negative Female for AMI  <22.0 ng/L- Negative Male for AMI  >=14 - Abnormal Female indicating possible myocardial injury.  >=22 - Abnormal Male indicating possible myocardial injury.   Clinicians would " have to utilize clinical acumen, EKG, Troponin, and serial changes to determine if it is an Acute Myocardial Infarction or myocardial injury due to an underlying chronic condition.        TROPONIN - Normal    Narrative:     High Sensitive Troponin T Reference Range:  <14.0 ng/L- Negative Female for AMI  <22.0 ng/L- Negative Male for AMI  >=14 - Abnormal Female indicating possible myocardial injury.  >=22 - Abnormal Male indicating possible myocardial injury.   Clinicians would have to utilize clinical acumen, EKG, Troponin, and serial changes to determine if it is an Acute Myocardial Infarction or myocardial injury due to an underlying chronic condition.        MRSA SCREEN, PCR   BLOOD CULTURE   BLOOD CULTURE   LACTIC ACID, PLASMA   CBC AND DIFFERENTIAL    Narrative:     The following orders were created for panel order CBC & Differential.  Procedure                               Abnormality         Status                     ---------                               -----------         ------                     CBC Auto Differential[[id number]]        Abnormal            Final result                 Please view results for these tests on the individual orders.       Meds Given in ED:   Medications   sodium chloride 0.9 % flush 10 mL (has no administration in time range)   cefTRIAXone (ROCEPHIN) 2,000 mg in sodium chloride 0.9 % 100 mL MBP (has no administration in time range)   vancomycin 3000 mg/500 mL 0.9% NS IVPB ([facility]) (has no administration in time range)   bumetanide (BUMEX) injection 2 mg (has no administration in time range)   HYDROcodone-acetaminophen (NORCO) 5-325 MG per tablet 1 tablet (1 tablet Oral Given 3/9/25 1238)   ondansetron ODT (ZOFRAN-ODT) disintegrating tablet 4 mg (4 mg Oral Given 3/9/25 1238)           Last NIH score:                                                          Dysphagia screening results:        Candace Coma Scale:  No data recorded     CIWA:        Restraint Type:             Isolation Status:  No active isolations

## 2025-03-09 NOTE — H&P
[facility] Medicine Services  HISTORY AND PHYSICAL    Patient Name: Romel Wilson  : 1950  MRN: [id number]  Primary Care Physician: Lena Bush MD  Date of admission: 3/9/2025    Subjective   Subjective     Chief Complaint: Lower extremity pain and swelling.    HPI:  Romel Wilson is a 75 y.o. female with past medical history of morbid obesity with BMI of 53, hypothyroidism, obstructive sleep apnea noncompliant with CPAP and chronic lower extremity lymphedema?.  Patient presented to our ED today with worsening left lower extremity pain and swelling.  Started .  Getting worse.  Associated with skin discoloration.  She was seen by her PCP last month.  Bumex was doubled.  Several lower extremity testing including ultrasound were done with ankle and foot specialist and cardiology.  Reportedly where no acute DVTs.  In ED patient was hemodynamically stable.  On room air.  Labs showed mild leukocytosis with white blood count of 11.2 normal Pro-Bruno, lactate, and proBNP.  CRP 1.3.  Duplex today ordered and pending.  Chest x-ray negative.  Patient received dose of ceftriaxone and vancomycin in the ED for possible left lower extremity cellulitis.  [facility] was asked to admit the patient for further eval.        Review of Systems   Constitutional:  Positive for fatigue. Negative for fever.   Respiratory:  Negative for shortness of breath.    Cardiovascular:  Positive for leg swelling.   Gastrointestinal:  Positive for abdominal distention. Negative for anal bleeding.   Musculoskeletal:  Positive for arthralgias.          Personal History     Past Medical History:   Diagnosis Date    Arthritis     Disease of thyroid gland     Gout              Past Surgical History:   Procedure Laterality Date    HYSTERECTOMY      THYROIDECTOMY         Family History:  family history includes Hypertension in her mother.     Social History:  reports that she has been smoking cigarettes. She has  never used smokeless tobacco. She reports that she does not drink alcohol and does not use drugs.  Social History     Social History Narrative    Not on file       Medications:  bumetanide, cholecalciferol, hydrOXYzine pamoate, levothyroxine, potassium chloride, rosuvastatin, vitamin B-12, and vitamin C    No Known Allergies    Objective   Objective     Vital Signs:   Temp:  [98.8 °F (37.1 °C)] 98.8 °F (37.1 °C)  Heart Rate:  [61-75] 69  Resp:  [20] 20  BP: ()/(53-96) 129/56    Physical Exam  Vitals and nursing note reviewed.   Constitutional:       Appearance: She is obese.   HENT:      Head: Normocephalic.      Mouth/Throat:      Mouth: Mucous membranes are moist.   Cardiovascular:      Rate and Rhythm: Normal rate.   Pulmonary:      Effort: Pulmonary effort is normal.   Abdominal:      General: There is distension.      Tenderness: There is no abdominal tenderness.   Musculoskeletal:         General: Swelling and tenderness present.      Left lower leg: Edema present.   Neurological:      General: No focal deficit present.      Mental Status: She is alert.   Psychiatric:         Mood and Affect: Mood normal.            Result Review:  I have personally reviewed the results from the time of this admission to 3/9/2025 15:11 EDT and agree with these findings:  [x]  Laboratory list / accordion  []  Microbiology  [x]  Radiology  []  EKG/Telemetry   []  Cardiology/Vascular   []  Pathology  [x]  Old records  []  Other:  Most notable findings include:     LAB RESULTS:      Lab 03/09/25  1014   WBC 11.22*   HEMOGLOBIN 12.2   HEMATOCRIT 38.7   PLATELETS 285   NEUTROS ABS 7.85*   IMMATURE GRANS (ABS) 0.04   LYMPHS ABS 2.25   MONOS ABS 0.93*   EOS ABS 0.13   MCV 92.1   CRP 1.30*   PROCALCITONIN 0.04   LACTATE 1.3         Lab 03/09/25  1014   SODIUM 140   POTASSIUM 4.1   CHLORIDE 101   CO2 28.0   ANION GAP 11.0   BUN 10   CREATININE 1.05*   EGFR 55.5*   GLUCOSE 98   CALCIUM 9.0         Lab 03/09/25  1014   TOTAL PROTEIN  6.9   ALBUMIN 3.5   GLOBULIN 3.4   ALT (SGPT) 9   AST (SGOT) 17   BILIRUBIN 0.5   ALK PHOS 94         Lab 03/09/25  1254 03/09/25  1122 03/09/25  1014   PROBNP  --   --  132.9   HSTROP T 13 17* 12                 Brief Urine Lab Results       None          Microbiology Results (last 10 days)       ** No results found for the last 240 hours. **            XR Chest 1 View  Result Date: 3/9/2025  XR CHEST 1 VW Date of Exam: 3/9/2025 10:30 AM EDT Indication: cough Comparison: Low-dose chest CT [address] 23 Findings: Examination limited by body habitus. Cardiomediastinal silhouette is unremarkable. There is been a sternotomy. There is generalized interstitial prominence, similar to previous CT given differences in modality. No airspace disease, pneumothorax, nor pleural effusion. No acute osseous abnormality identified.     Impression: Impression: 1.No acute process identified Electronically Signed: Dwayne Shaw MD  3/9/2025 10:38 AM EDT  Workstation ID: [id number]          Assessment & Plan   Assessment & Plan       Cellulitis of left lower extremity    Obstructive sleep apnea, adult    Hypothyroidism (acquired)    Romel Wilson is a 75 y.o. female with past medical history of morbid obesity with BMI of 53, hypothyroidism, obstructive sleep apnea noncompliant with CPAP and chronic lower extremity lymphedema?. Patient presented to our ED today with worsening left lower extremity pain and swelling.     ## Acute on chronic lower extremity edema.  Failed p.o. diuretics  ## Chronic venous stasis with concern for left lower extremity purulent cellulitis  ## Morbid obesity complicating all aspects of care  - She was seen by her PCP last month.  Bumex was doubled with no improvement.    - Several lower extremity testing including ultrasound were done with ankle and foot specialist and cardiology.   - Reportedly where no acute DVTs.    - In ED patient was hemodynamically stable.  On room air.    - Labs showed mild leukocytosis with  white blood count of 11.2 normal Pro-Bruno, lactate, and proBNP.  CRP 1.3.    - Duplex today ordered and pending. Chest x-ray negative.    - Patient received dose of ceftriaxone and vancomycin in the ED for possible left lower extremity cellulitis.    -Continue ceftriaxone 2 g IV daily with the plan to switch to oral antibiotic on discharge.  -No previous echo in the chart.  Ordered  -Bumex 2 g IV x 1 in the ED.  Since patient failed Bumex will try Lasix 40 mg IV twice daily.  -SCDs.  Edema wear.  Leg elevation. PT/OT.  CM to facilitate discharge.  -UA without cultures and microalbumin/creatinine ratio ordered to rule out nephrotic syndrome      ## Obstructive sleep apnea.  Resume home CPAP.    ## Hypothyroidism.  Resume home levothyroxine.  TSH added to the morning labs.    DVT prophylaxis: Heparin subcu    CODE STATUS: Full code       Expected Discharge  Expected Discharge Date: 3/11/2025; Expected Discharge Time:       This note has been completed as part of a split-shared workflow.     Signature: Electronically signed by Lisandro Godoy MD, 03/09/25, 3:16 PM EDT

## 2025-03-09 NOTE — Clinical Note
Level of Care: Telemetry [5]   Diagnosis: Cellulitis of left lower extremity [094907]   Admitting Physician: ANA MARIA NGUYEN [[id number]]   Attending Physician: ANA MARIA NGUYEN [[id number]]

## 2025-03-10 ENCOUNTER — APPOINTMENT (OUTPATIENT)
Dept: CARDIOLOGY | Facility: HOSPITAL | Age: 75
DRG: 603 | End: 2025-03-10
Payer: MEDICARE

## 2025-03-10 LAB
ALBUMIN SERPL-MCNC: 3.5 G/DL (ref 3.5–5.2)
ALBUMIN UR-MCNC: <1.2 MG/DL
ALBUMIN/GLOB SERPL: 1.3 G/DL
ALP SERPL-CCNC: 96 U/L (ref 39–117)
ALT SERPL W P-5'-P-CCNC: 9 U/L (ref 1–33)
ANION GAP SERPL CALCULATED.3IONS-SCNC: 12 MMOL/L (ref 5–15)
AST SERPL-CCNC: 16 U/L (ref 1–32)
AV MEAN PRESS GRAD SYS DOP V1V2: 4.6 MMHG
AV VMAX SYS DOP: 151.4 CM/SEC
BASOPHILS # BLD AUTO: 0.03 10*3/MM3 (ref 0–0.2)
BASOPHILS NFR BLD AUTO: 0.3 % (ref 0–1.5)
BH CV ECHO MEAS - AI P1/2T: 680 MSEC
BH CV ECHO MEAS - AO MAX PG: 9.2 MMHG
BH CV ECHO MEAS - AO ROOT DIAM: 3.6 CM
BH CV ECHO MEAS - AO V2 VTI: 25.6 CM
BH CV ECHO MEAS - AVA(I,D): 2.8 CM2
BH CV ECHO MEAS - EDV(MOD-SP2): 62.1 ML
BH CV ECHO MEAS - EDV(MOD-SP4): 63.6 ML
BH CV ECHO MEAS - EF(MOD-SP2): 67.3 %
BH CV ECHO MEAS - EF(MOD-SP4): 66.7 %
BH CV ECHO MEAS - ESV(MOD-SP2): 20.3 ML
BH CV ECHO MEAS - ESV(MOD-SP4): 21.2 ML
BH CV ECHO MEAS - LAT PEAK E' VEL: 10.5 CM/SEC
BH CV ECHO MEAS - LV MAX PG: 4.3 MMHG
BH CV ECHO MEAS - LV MEAN PG: 1.97 MMHG
BH CV ECHO MEAS - LV V1 MAX: 101.4 CM/SEC
BH CV ECHO MEAS - LV V1 VTI: 20.6 CM
BH CV ECHO MEAS - LVOT AREA: 3.5 CM2
BH CV ECHO MEAS - LVOT DIAM: 2.1 CM
BH CV ECHO MEAS - MED PEAK E' VEL: 14.3 CM/SEC
BH CV ECHO MEAS - MV A MAX VEL: 89.1 CM/SEC
BH CV ECHO MEAS - MV DEC SLOPE: 314.6 CM/SEC2
BH CV ECHO MEAS - MV E MAX VEL: 68.6 CM/SEC
BH CV ECHO MEAS - MV E/A: 0.77
BH CV ECHO MEAS - MV MAX PG: 4.1 MMHG
BH CV ECHO MEAS - MV MEAN PG: 1.5 MMHG
BH CV ECHO MEAS - MV P1/2T: 69 MSEC
BH CV ECHO MEAS - MV V2 VTI: 23.2 CM
BH CV ECHO MEAS - MVA(VTI): 3.1 CM2
BH CV ECHO MEAS - PA V2 MAX: 87.5 CM/SEC
BH CV ECHO MEAS - SV(LVOT): 71.2 ML
BH CV ECHO MEAS - SV(MOD-SP2): 41.8 ML
BH CV ECHO MEAS - SV(MOD-SP4): 42.4 ML
BH CV ECHO MEASUREMENTS AVERAGE E/E' RATIO: 5.53
BH CV VAS BP LEFT ARM: NORMAL MMHG
BH CV XLRA - TDI S': 16.2 CM/SEC
BILIRUB SERPL-MCNC: 0.4 MG/DL (ref 0–1.2)
BILIRUB UR QL STRIP: NEGATIVE
BUN SERPL-MCNC: 10 MG/DL (ref 8–23)
BUN/CREAT SERPL: 9.2 (ref 7–25)
CALCIUM SPEC-SCNC: 8.7 MG/DL (ref 8.6–10.5)
CHLORIDE SERPL-SCNC: 100 MMOL/L (ref 98–107)
CLARITY UR: CLEAR
CO2 SERPL-SCNC: 30 MMOL/L (ref 22–29)
COLOR UR: YELLOW
CREAT SERPL-MCNC: 1.09 MG/DL (ref 0.57–1)
CREAT UR-MCNC: 16.6 MG/DL
DEPRECATED RDW RBC AUTO: 53.8 FL (ref 37–54)
EGFRCR SERPLBLD CKD-EPI 2021: 53.1 ML/MIN/1.73
EOSINOPHIL # BLD AUTO: 0.14 10*3/MM3 (ref 0–0.4)
EOSINOPHIL NFR BLD AUTO: 1.2 % (ref 0.3–6.2)
ERYTHROCYTE [DISTWIDTH] IN BLOOD BY AUTOMATED COUNT: 15.6 % (ref 12.3–15.4)
GLOBULIN UR ELPH-MCNC: 2.7 GM/DL
GLUCOSE SERPL-MCNC: 93 MG/DL (ref 65–99)
GLUCOSE UR STRIP-MCNC: NEGATIVE MG/DL
HCT VFR BLD AUTO: 39 % (ref 34–46.6)
HGB BLD-MCNC: 12.1 G/DL (ref 12–15.9)
HGB UR QL STRIP.AUTO: NEGATIVE
IMM GRANULOCYTES # BLD AUTO: 0.05 10*3/MM3 (ref 0–0.05)
IMM GRANULOCYTES NFR BLD AUTO: 0.4 % (ref 0–0.5)
KETONES UR QL STRIP: NEGATIVE
LEFT ATRIUM VOLUME INDEX: 17 ML/M2
LEUKOCYTE ESTERASE UR QL STRIP.AUTO: NEGATIVE
LV EF BIPLANE MOD: 66.7 %
LYMPHOCYTES # BLD AUTO: 2.42 10*3/MM3 (ref 0.7–3.1)
LYMPHOCYTES NFR BLD AUTO: 21.5 % (ref 19.6–45.3)
MAGNESIUM SERPL-MCNC: 1.9 MG/DL (ref 1.6–2.4)
MCH RBC QN AUTO: 29 PG (ref 26.6–33)
MCHC RBC AUTO-ENTMCNC: 31 G/DL (ref 31.5–35.7)
MCV RBC AUTO: 93.5 FL (ref 79–97)
MICROALBUMIN/CREAT UR: NORMAL MG/G{CREAT}
MONOCYTES # BLD AUTO: 0.87 10*3/MM3 (ref 0.1–0.9)
MONOCYTES NFR BLD AUTO: 7.7 % (ref 5–12)
MRSA DNA SPEC QL NAA+PROBE: NEGATIVE
NEUTROPHILS NFR BLD AUTO: 68.9 % (ref 42.7–76)
NEUTROPHILS NFR BLD AUTO: 7.74 10*3/MM3 (ref 1.7–7)
NITRITE UR QL STRIP: NEGATIVE
NRBC BLD AUTO-RTO: 0 /100 WBC (ref 0–0.2)
PH UR STRIP.AUTO: 7.5 [PH] (ref 5–8)
PLATELET # BLD AUTO: 273 10*3/MM3 (ref 140–450)
PMV BLD AUTO: 9.4 FL (ref 6–12)
POTASSIUM SERPL-SCNC: 3.6 MMOL/L (ref 3.5–5.2)
POTASSIUM SERPL-SCNC: 4.2 MMOL/L (ref 3.5–5.2)
PROT SERPL-MCNC: 6.2 G/DL (ref 6–8.5)
PROT UR QL STRIP: NEGATIVE
RBC # BLD AUTO: 4.17 10*6/MM3 (ref 3.77–5.28)
SODIUM SERPL-SCNC: 142 MMOL/L (ref 136–145)
SP GR UR STRIP: 1.01 (ref 1–1.03)
TSH SERPL DL<=0.05 MIU/L-ACNC: 4.51 UIU/ML (ref 0.27–4.2)
UROBILINOGEN UR QL STRIP: NORMAL
WBC NRBC COR # BLD AUTO: 11.25 10*3/MM3 (ref 3.4–10.8)

## 2025-03-10 PROCEDURE — 84132 ASSAY OF SERUM POTASSIUM: CPT | Performed by: HOSPITALIST

## 2025-03-10 PROCEDURE — 25010000002 CEFTRIAXONE PER 250 MG: Performed by: HOSPITALIST

## 2025-03-10 PROCEDURE — 87641 MR-STAPH DNA AMP PROBE: CPT | Performed by: HOSPITALIST

## 2025-03-10 PROCEDURE — 25010000002 FUROSEMIDE PER 20 MG: Performed by: HOSPITALIST

## 2025-03-10 PROCEDURE — 80053 COMPREHEN METABOLIC PANEL: CPT | Performed by: HOSPITALIST

## 2025-03-10 PROCEDURE — 97166 OT EVAL MOD COMPLEX 45 MIN: CPT

## 2025-03-10 PROCEDURE — 83735 ASSAY OF MAGNESIUM: CPT | Performed by: HOSPITALIST

## 2025-03-10 PROCEDURE — 93306 TTE W/DOPPLER COMPLETE: CPT | Performed by: INTERNAL MEDICINE

## 2025-03-10 PROCEDURE — 82570 ASSAY OF URINE CREATININE: CPT | Performed by: HOSPITALIST

## 2025-03-10 PROCEDURE — 85025 COMPLETE CBC W/AUTO DIFF WBC: CPT | Performed by: HOSPITALIST

## 2025-03-10 PROCEDURE — 81003 URINALYSIS AUTO W/O SCOPE: CPT | Performed by: HOSPITALIST

## 2025-03-10 PROCEDURE — 84443 ASSAY THYROID STIM HORMONE: CPT | Performed by: HOSPITALIST

## 2025-03-10 PROCEDURE — 93306 TTE W/DOPPLER COMPLETE: CPT

## 2025-03-10 PROCEDURE — 82043 UR ALBUMIN QUANTITATIVE: CPT | Performed by: HOSPITALIST

## 2025-03-10 PROCEDURE — 99232 SBSQ HOSP IP/OBS MODERATE 35: CPT | Performed by: STUDENT IN AN ORGANIZED HEALTH CARE EDUCATION/TRAINING PROGRAM

## 2025-03-10 PROCEDURE — 97535 SELF CARE MNGMENT TRAINING: CPT

## 2025-03-10 PROCEDURE — 25010000002 ENOXAPARIN PER 10 MG: Performed by: HOSPITALIST

## 2025-03-10 PROCEDURE — 97162 PT EVAL MOD COMPLEX 30 MIN: CPT

## 2025-03-10 RX ORDER — POTASSIUM CHLORIDE 1500 MG/1
40 TABLET, EXTENDED RELEASE ORAL EVERY 4 HOURS
Status: COMPLETED | OUTPATIENT
Start: 2025-03-10 | End: 2025-03-10

## 2025-03-10 RX ADMIN — ACETAMINOPHEN 650 MG: 325 TABLET, FILM COATED ORAL at 19:44

## 2025-03-10 RX ADMIN — GUAIFENESIN AND DEXTROMETHORPHAN HYDROBROMIDE 1 TABLET: 600; 30 TABLET, EXTENDED RELEASE ORAL at 08:54

## 2025-03-10 RX ADMIN — FUROSEMIDE 40 MG: 10 INJECTION, SOLUTION INTRAMUSCULAR; INTRAVENOUS at 07:01

## 2025-03-10 RX ADMIN — LEVOTHYROXINE SODIUM 125 MCG: 0.12 TABLET ORAL at 05:21

## 2025-03-10 RX ADMIN — GUAIFENESIN AND DEXTROMETHORPHAN HYDROBROMIDE 1 TABLET: 600; 30 TABLET, EXTENDED RELEASE ORAL at 21:05

## 2025-03-10 RX ADMIN — ENOXAPARIN SODIUM 60 MG: 60 INJECTION SUBCUTANEOUS at 05:21

## 2025-03-10 RX ADMIN — CEFTRIAXONE SODIUM 2000 MG: 2 INJECTION, POWDER, FOR SOLUTION INTRAMUSCULAR; INTRAVENOUS at 08:55

## 2025-03-10 RX ADMIN — POTASSIUM CHLORIDE 40 MEQ: 20 TABLET, EXTENDED RELEASE ORAL at 12:01

## 2025-03-10 RX ADMIN — Medication 10 ML: at 21:05

## 2025-03-10 RX ADMIN — FUROSEMIDE 40 MG: 10 INJECTION, SOLUTION INTRAMUSCULAR; INTRAVENOUS at 18:01

## 2025-03-10 RX ADMIN — ROSUVASTATIN 20 MG: 20 TABLET, FILM COATED ORAL at 21:05

## 2025-03-10 RX ADMIN — POTASSIUM CHLORIDE 40 MEQ: 20 TABLET, EXTENDED RELEASE ORAL at 07:01

## 2025-03-10 RX ADMIN — ENOXAPARIN SODIUM 60 MG: 60 INJECTION SUBCUTANEOUS at 18:01

## 2025-03-10 RX ADMIN — Medication 10 ML: at 09:00

## 2025-03-10 NOTE — PLAN OF CARE
Goal Outcome Evaluation:  Plan of Care Reviewed With: patient        Progress: no change  Outcome Evaluation: PT eval completed. Patient stood with full flexion over rwx, unable to demonstrate upright posture despite cues. She took very short, labored steps to chair, unable to ambulate further distance due to fatigue and L LE pain. Patient presents below baseline for functional mobility. Patient demonstrates decreased activity tolerance, deconditioning and reduced dynamic balance. Patient would continue to benefit from skilled PT services to improve dynamic balance with gait, transfers strength, and activity tolerance training in order to build endurance and reduce risk of falls.    Anticipated Discharge Disposition (PT): inpatient rehabilitation facility

## 2025-03-10 NOTE — PROGRESS NOTES
[facility] Medicine Services  PROGRESS NOTE    Patient Name: Romel Wilson  : 1950  MRN: [id number]    Date of Admission: 3/9/2025  Primary Care Physician: Hafsa Plummer MD    Subjective   Subjective     CC:  Cellulitis    HPI:  Reports left lower extremity is still swollen, tender, warm to touch.  Feels a little better compared to yesterday.  No fever or chills.  No chest pain or shortness of breath.  Patient's son at bedside.      Objective   Objective     Vital Signs:   Temp:  [97.5 °F (36.4 °C)-98.4 °F (36.9 °C)] 97.8 °F (36.6 °C)  Heart Rate:  [63-82] 70  Resp:  [18-22] 18  BP: ()/(49-95) 113/66  Flow (L/min) (Oxygen Therapy):  [2-3] 2     Physical Exam:  Constitutional: No acute distress, awake, alert, obese  HENT: NCAT, mucous membranes moist  Respiratory: Clear to auscultation bilaterally, respiratory effort normal   Cardiovascular: RRR  Gastrointestinal: Positive bowel sounds, soft, nontender, nondistended  Musculoskeletal: bilateral ankle edema, worse on the left  Psychiatric: Appropriate affect, cooperative  Neurologic: Alert, oriented, symmetric facies, moves all extremities, speech clear  Skin: Left lower extremity anterior shin erythematous with skin breakdown, warm to touch and mildly tender to palpation    Results Reviewed:  LAB RESULTS:      Lab 03/10/25  0430 25  1535 25  1254 25  1122 25  1014   WBC 11.25*  --   --   --  11.22*   HEMOGLOBIN 12.1  --   --   --  12.2   HEMATOCRIT 39.0  --   --   --  38.7   PLATELETS 273  --   --   --  285   NEUTROS ABS 7.74*  --   --   --  7.85*   IMMATURE GRANS (ABS) 0.05  --   --   --  0.04   LYMPHS ABS 2.42  --   --   --  2.25   MONOS ABS 0.87  --   --   --  0.93*   EOS ABS 0.14  --   --   --  0.13   MCV 93.5  --   --   --  92.1   CRP  --   --   --   --  1.30*   PROCALCITONIN  --   --   --   --  0.04   LACTATE  --  1.9  --   --  1.3   HSTROP T  --   --  13 17* 12         Lab 03/10/25  0430  03/09/25  1014   SODIUM 142 140   POTASSIUM 3.6 4.1   CHLORIDE 100 101   CO2 30.0* 28.0   ANION GAP 12.0 11.0   BUN 10 10   CREATININE 1.09* 1.05*   EGFR 53.1* 55.5*   GLUCOSE 93 98   CALCIUM 8.7 9.0   MAGNESIUM 1.9  --    TSH 4.510*  --          Lab 03/10/25  0430 03/09/25  1014   TOTAL PROTEIN 6.2 6.9   ALBUMIN 3.5 3.5   GLOBULIN 2.7 3.4   ALT (SGPT) 9 9   AST (SGOT) 16 17   BILIRUBIN 0.4 0.5   ALK PHOS 96 94         Lab 03/09/25  1254 03/09/25  1122 03/09/25  1014   PROBNP  --   --  132.9   HSTROP T 13 17* 12                 Brief Urine Lab Results  (Last result in the past 365 days)        Color   Clarity   Blood   Leuk Est   Nitrite   Protein   CREAT   Urine HCG        03/10/25 0051             16.6         03/10/25 0051 Yellow   Clear   Negative   Negative   Negative   Negative                   Microbiology Results Abnormal       None            Duplex Venous Lower Extremity - Left  Result Date: 3/9/2025    No evidence of deep or superficial venous thrombus in the left lower extremity.  The calf veins were difficult to visualize but no apparent thrombus.     XR Chest 1 View  Result Date: 3/9/2025  XR CHEST 1 VW Date of Exam: 3/9/2025 10:30 AM EDT Indication: cough Comparison: Low-dose chest CT [address] 23 Findings: Examination limited by body habitus. Cardiomediastinal silhouette is unremarkable. There is been a sternotomy. There is generalized interstitial prominence, similar to previous CT given differences in modality. No airspace disease, pneumothorax, nor pleural effusion. No acute osseous abnormality identified.     Impression: Impression: 1.No acute process identified Electronically Signed: Dwayne Sahw MD  3/9/2025 10:38 AM EDT  Workstation ID: [id number]          Current medications:  Scheduled Meds:cefTRIAXone, 2,000 mg, Intravenous, Daily  enoxaparin sodium, 60 mg, Subcutaneous, Q12H  furosemide, 40 mg, Intravenous, Q12H  guaifenesin-dextromethorphan 600-30 mg, 1 tablet, Oral, Q12H  levothyroxine, 125  mcg, Oral, Q AM  rosuvastatin, 20 mg, Oral, Nightly  sodium chloride, 10 mL, Intravenous, Q12H      Continuous Infusions:   PRN Meds:.  acetaminophen **OR** acetaminophen **OR** acetaminophen    senna-docusate sodium **AND** polyethylene glycol **AND** bisacodyl **AND** bisacodyl    Calcium Replacement - Follow Nurse / BPA Driven Protocol    Magnesium Low Dose Replacement - Follow Nurse / BPA Driven Protocol    ondansetron ODT **OR** ondansetron    Phosphorus Replacement - Follow Nurse / BPA Driven Protocol    Potassium Replacement - Follow Nurse / BPA Driven Protocol    [COMPLETED] Insert Peripheral IV **AND** sodium chloride    sodium chloride    sodium chloride    Assessment & Plan   Assessment & Plan     Active Hospital Problems    Diagnosis  POA    **Cellulitis of left lower extremity [L03.116]  Yes    Hypothyroidism (acquired) [E03.9]  Unknown    Obstructive sleep apnea, adult [G47.33]  Yes      Resolved Hospital Problems   No resolved problems to display.        Brief Hospital Course to date:  Romel Wilson is a 75 y.o. female with history of morbid obesity (BMI 53), NANCY noncompliant with CPAP, hypothyroidism, who presented for evaluation of worsening left lower extremity pain and swelling.  Patient reports worsening of lower extremity edema since December.  PCP has been increasing Bumex dosing outpatient without improvement in symptoms.  Venous duplex negative for VTE on admission.    Acute on chronic bilateral lower extremity edema, worse on the left  Chronic venous stasis changes left lower extremity with superimposed left lower extremity cellulitis  -Echo pending  -IV diuresis  -PT wound for compression wraps  -Continue ceftriaxone; no evidence of purulence, stop vancomycin    Deconditioning  -PT/OT evaluation    NANCY-CPAP  Hypothyroidism-continue Synthroid  HLD-continue Crestor    Expected Discharge Location and Transportation: Rehab?  Expected Discharge   Expected Discharge Date: 3/14/2025; Expected  Discharge Time:      VTE Prophylaxis:  Pharmacologic & mechanical VTE prophylaxis orders are present.         AM-PAC 6 Clicks Score (PT): 12 (03/10/25 1702)    CODE STATUS:   Code Status and Medical Interventions: No CPR (Do Not Attempt to Resuscitate); Limited Support; No intubation (DNI)   Ordered at: 03/10/25 1203     Code Status (Patient has no pulse and is not breathing):    No CPR (Do Not Attempt to Resuscitate)     Medical Interventions (Patient has pulse or is breathing):    Limited Support     Medical Intervention Limits:    No intubation (DNI)     Level Of Support Discussed With:    Patient       Larissa Conteh MD  03/10/25

## 2025-03-10 NOTE — CONSULTS
I visited this patient per her request to assist with advance care planning. The patient's living will is complete, notarized, and filed appropriately. I provided the original plus two copies to the patient's sister at the bedside. I do not have plans to continue following but am happy to return if requested or otherwise needed. Please re-consult in that case.

## 2025-03-10 NOTE — PLAN OF CARE
Problem: Adult Inpatient Plan of Care  Goal: Absence of Hospital-Acquired Illness or Injury  Intervention: Identify and Manage Fall Risk  Recent Flowsheet Documentation  Taken 3/10/2025 0052 by Paola Tobar RN  Safety Promotion/Fall Prevention: activity supervised  Taken 3/9/2025 1949 by Paola Tobar RN  Safety Promotion/Fall Prevention:   activity supervised   safety round/check completed   clutter free environment maintained   assistive device/personal items within reach   fall prevention program maintained  Intervention: Prevent Skin Injury  Recent Flowsheet Documentation  Taken 3/10/2025 0052 by Paola Tobar RN  Body Position:   left   turned  Taken 3/9/2025 1949 by Paola Tobar RN  Body Position: right  Intervention: Prevent Infection  Recent Flowsheet Documentation  Taken 3/10/2025 0052 by Paola Tobar RN  Infection Prevention:   environmental surveillance performed   single patient room provided  Taken 3/9/2025 1949 by Paola Tobar RN  Infection Prevention:   environmental surveillance performed   equipment surfaces disinfected   hand hygiene promoted  Goal: Optimal Comfort and Wellbeing  Intervention: Provide Person-Centered Care  Recent Flowsheet Documentation  Taken 3/9/2025 1949 by Paola Tobar RN  Trust Relationship/Rapport:   emotional support provided   empathic listening provided   questions answered   questions encouraged   care explained     Problem: Skin Injury Risk Increased  Goal: Skin Health and Integrity  Intervention: Optimize Skin Protection  Recent Flowsheet Documentation  Taken 3/10/2025 0052 by Paola Tobar RN  Activity Management: activity encouraged  Head of Bed (HOB) Positioning: HOB at 20-30 degrees  Taken 3/9/2025 1949 by Paola Toabr RN  Activity Management: activity encouraged  Head of Bed (HOB) Positioning: HOB at 20-30 degrees     Problem: Fall Injury Risk  Goal: Absence of Fall and Fall-Related Injury  Intervention: Promote  Injury-Free Environment  Recent Flowsheet Documentation  Taken 3/10/2025 0052 by Paola Tobar, RN  Safety Promotion/Fall Prevention: activity supervised  Taken 3/9/2025 1949 by Paola Tobar, RN  Safety Promotion/Fall Prevention:   activity supervised   safety round/check completed   clutter free environment maintained   assistive device/personal items within reach   fall prevention program maintained   Goal Outcome Evaluation:

## 2025-03-10 NOTE — CASE MANAGEMENT/SOCIAL WORK
Discharge Planning Assessment  [facility]     Patient Name: Romel Wilson  MRN: [id number]  Today's Date: 3/10/2025    Admit Date: 3/9/2025    Plan: IDP, d/c goal is home   Discharge Needs Assessment       Row Name 03/10/25 1456       Living Environment    People in Home alone    Current Living Arrangements apartment    Potentially Unsafe Housing Conditions none    In the past 12 months has the electric, gas, oil, or water company threatened to shut off services in your home? No    Primary Care Provided by self;child(douglas);other (see comments)    Provides Primary Care For no one    Family Caregiver if Needed sibling(s);child(douglas), adult    Family Caregiver Names Kurt Alberto son, siblings    Quality of Family Relationships helpful;involved;supportive    Living Arrangement Comments Lives in  [facility] alone in a first level apt.       Resource/Environmental Concerns    Resource/Environmental Concerns none       Transportation Needs    In the past 12 months, has lack of transportation kept you from medical appointments or from getting medications? no    In the past 12 months, has lack of transportation kept you from meetings, work, or from getting things needed for daily living? No       Food Insecurity    Within the past 12 months, you worried that your food would run out before you got the money to buy more. Never true    Within the past 12 months, the food you bought just didn't last and you didn't have money to get more. Never true       Transition Planning    Patient/Family Anticipates Transition to home    Patient/Family Anticipated Services at Transition     Transportation Anticipated family or friend will provide       Discharge Needs Assessment    Equipment Currently Used at Home cpap;rollator    Do you want help finding or keeping work or a job? I do not need or want help    Do you want help with school or training? For example, starting or completing job training or getting a high school  diploma, GED or equivalent No                   Discharge Plan       Row Name 03/10/25 1458       Plan    Plan IDP, d/c goal is home    Patient/Family in Agreement with Plan yes    Plan Comments I met w/patient in room w/sibling @ the bedside to obtain IDp and initiate d/c planning. Insurance of [employer] confirmed. [facility]. Denies HH, home O2, she uses a RW @ baseline and Cpap. She does not drive, her son transports to appointments  or she uses Wheels. She requested to see a  for living will purposes. I contacted [facility]  PCP Dr. Plummer. DC goal is home, TBD @ this time. CM will continue to follow.    Final Discharge Disposition Code 01 - home or self-care                    Expected Discharge Date and Time       Expected Discharge Date Expected Discharge Time    Mar 14, 2025            Demographic Summary       Row Name 03/10/25 1453       General Information    Admission Type inpatient    Arrived From home    Referral Source emergency department    Preferred Language English    General Information Comments PCP Hafsa Plummer, wants to do a living will  called                   Functional Status       Row Name 03/10/25 1456       Functional Status    Usual Activity Tolerance moderate    Current Activity Tolerance fair       Physical Activity    On average, how many days per week do you engage in moderate to strenuous exercise (like a brisk walk)? 0 days    On average, how many minutes do you engage in exercise at this level? 0 min    Number of minutes of exercise per week 0       Functional Status, IADL    Medications independent    Meal Preparation assistive person    Housekeeping assistive person    Laundry assistive person    Shopping assistive person    If for any reason you need help with day-to-day activities such as bathing, preparing meals, shopping, managing finances, etc., do you get the help you need? I could  use a little more help                   Psychosocial    No documentation.                  Abuse/Neglect    No documentation.                  Legal    No documentation.                  Substance Abuse    No documentation.                  Patient Forms    No documentation.                     Evan Lyle RN

## 2025-03-10 NOTE — THERAPY EVALUATION
Patient Name: Romel Wilson  : 1950    MRN: [id number]                              Today's Date: 3/10/2025       Admit Date: 3/9/2025    Visit Dx:     ICD-10-CM ICD-9-CM   1. Pain of left lower extremity  M79.605 729.5   2. Cellulitis of left lower extremity  L03.116 682.6     Patient Active Problem List   Diagnosis    Obstructive sleep apnea, adult    Morbid obesity due to excess calories    Cellulitis of left lower extremity    Hypothyroidism (acquired)     Past Medical History:   Diagnosis Date    Arthritis     Disease of thyroid gland     Gout      Past Surgical History:   Procedure Laterality Date    HYSTERECTOMY      THYROIDECTOMY        General Information       Row Name 03/10/25 1012          OT Time and Intention    Document Type evaluation  -     Mode of Treatment occupational therapy  -       Row Name 03/10/25 1012          General Information    Patient Profile Reviewed yes  -     Prior Level of Function independent:;bed mobility;transfer;all household mobility;ADL's  Pt uses rollator for mobility, walks HH distances  -     Existing Precautions/Restrictions fall;other (see comments)  LLE pain  -     Barriers to Rehab medically complex  -       Row Name 03/10/25 1012          Living Environment    Current Living Arrangements apartment  -     People in Home alone  -       Row Name 03/10/25 1012          Home Main Entrance    Number of Stairs, Main Entrance none  -       Row Name 03/10/25 1012          Stairs Within Home, Primary    Number of Stairs, Within Home, Primary none  -       Row Name 03/10/25 1012          Cognition    Orientation Status (Cognition) oriented x 3  -       Row Name 03/10/25 1012          Safety Issues/Impairments Affecting Functional Mobility    Safety Issues Affecting Function (Mobility) awareness of need for assistance;insight into deficits/self-awareness;safety precaution awareness;safety precautions follow-through/compliance;sequencing abilities   -     Impairments Affecting Function (Mobility) balance;endurance/activity tolerance;pain;strength;shortness of breath  -               User Key  (r) = Recorded By, (t) = Taken By, (c) = Cosigned By      Initials Name Provider Type     Bharti Todd OT Occupational Therapist                     Mobility/ADL's       Row Name 03/10/25 1013          Bed Mobility    Bed Mobility supine-sit  -     Supine-Sit Motley (Bed Mobility) minimum assist (75% patient effort);2 person assist;verbal cues  -     Bed Mobility, Safety Issues decreased use of arms for pushing/pulling;decreased use of legs for bridging/pushing  -     Assistive Device (Bed Mobility) bed rails;head of bed elevated;repositioning sheet  -       Row Name 03/10/25 1013          Transfers    Transfers sit-stand transfer;stand-sit transfer  -       Row Name 03/10/25 1013          Sit-Stand Transfer    Sit-Stand Motley (Transfers) moderate assist (50% patient effort);2 person assist;verbal cues  -     Assistive Device (Sit-Stand Transfers) walker, front-wheeled  -       Row Name 03/10/25 1013          Stand-Sit Transfer    Stand-Sit Motley (Transfers) moderate assist (50% patient effort);2 person assist;verbal cues  -     Assistive Device (Stand-Sit Transfers) other (see comments)  -       Row Name 03/10/25 1013          Activities of Daily Living    BADL Assessment/Intervention lower body dressing;upper body dressing;bathing  -       Row Name 03/10/25 1013          Lower Body Dressing Assessment/Training    Motley Level (Lower Body Dressing) don;socks;dependent (less than 25% patient effort);verbal cues  -     Position (Lower Body Dressing) supine  -       Row Name 03/10/25 1013          Upper Body Dressing Assessment/Training    [place] Level (Upper Body Dressing) don;other (see comments);minimum assist (75% patient effort)  -     Position (Upper Body Dressing) sitting up in bed  -      Comment, (Upper Body Dressing) rakel gonsaleswn  -       Row Name 03/10/25 1013          Bathing Assessment/Intervention    [place] Level (Bathing) dependent (less than 25% patient effort);verbal cues  -     Position (Bathing) sitting up in bed;supported standing  -     Comment, (Bathing) Pt's son bathing pt upon entry, pt's son washed pt's buttock upon standing.  -               User Key  (r) = Recorded By, (t) = Taken By, (c) = Cosigned By      Initials Name Provider Type     Bharti Todd OT Occupational Therapist                   Obj/Interventions       Row Name 03/10/25 1014          Sensory Assessment (Somatosensory)    Sensory Assessment (Somatosensory) UE sensation intact  -       Row Name 03/10/25 1014          Vision Assessment/Intervention    Visual Impairment/Limitations WFL  -       Row Name 03/10/25 1014          Range of Motion Comprehensive    General Range of Motion bilateral upper extremity ROM WFL  -[facility] Name 03/10/25 1014          Strength Comprehensive (MMT)    General Manual Muscle Testing (MMT) Assessment upper extremity strength deficits identified  -     Comment, General Manual Muscle Testing (MMT) Assessment BUE grossly 4/5  -       Row Name 03/10/25 1014          Balance    Balance Assessment sitting static balance;sitting dynamic balance;sit to stand dynamic balance;standing static balance  -     Static Sitting Balance contact guard  -     Dynamic Sitting Balance minimal assist  -     Position, Sitting Balance unsupported;sitting edge of bed  -     Sit to Stand Dynamic Balance moderate assist;2-person assist;verbal cues  -     Static Standing Balance moderate assist;2-person assist;verbal cues  -     Position/Device Used, Standing Balance supported;walker, front-wheeled  -     Balance Interventions sitting;sit to stand;occupation based/functional task  -               User Key  (r) = Recorded By, (t) = Taken By, (c) = Cosigned By       Initials Name Provider Type     Bharti Todd, OT Occupational Therapist                   Goals/Plan       Row Name 03/10/25 1016          Bed Mobility Goal 1 (OT)    Activity/Assistive Device (Bed Mobility Goal 1, OT) sit to supine;supine to sit  -     [place] Level/Cues Needed (Bed Mobility Goal 1, OT) standby assist  -     Time Frame (Bed Mobility Goal 1, OT) long term goal (LTG);10 days  -     Progress/Outcomes (Bed Mobility Goal 1, OT) goal ongoing  -       Row Name 03/10/25 1016          Transfer Goal 1 (OT)    Activity/Assistive Device (Transfer Goal 1, OT) sit-to-stand/stand-to-sit;bed-to-chair/chair-to-bed;toilet;walker, rolling  -     [place] Level/Cues Needed (Transfer Goal 1, OT) contact guard required  -     Time Frame (Transfer Goal 1, OT) long term goal (LTG);10 days  -     Progress/Outcome (Transfer Goal 1, OT) goal ongoing  -       Row Name 03/10/25 1016          Dressing Goal 1 (OT)    Activity/Device (Dressing Goal 1, OT) lower body dressing  don/doff socks w/ AAD  -     [place]/Cues Needed (Dressing Goal 1, OT) moderate assist (50-74% patient effort)  -     Time Frame (Dressing Goal 1, OT) long term goal (LTG);10 days  -     Progress/Outcome (Dressing Goal 1, OT) goal ongoing  -       Row Name 03/10/25 1016          Toileting Goal 1 (OT)    Activity/Device (Toileting Goal 1, OT) adjust/manage clothing;perform perineal hygiene;commode;grab bar/safety frame  -     [place] Level/Cues Needed (Toileting Goal 1, OT) minimum assist (75% or more patient effort)  -     Time Frame (Toileting Goal 1, OT) short term goal (STG);5 days  -     Progress/Outcome (Toileting Goal 1, OT) goal ongoing  -       Row Name 03/10/25 1016          Therapy Assessment/Plan (OT)    Planned Therapy Interventions (OT) activity tolerance training;adaptive equipment training;BADL retraining;edema control/reduction;functional balance retraining;IADL  retraining;occupation/activity based interventions;patient/caregiver education/training;ROM/therapeutic exercise;strengthening exercise;transfer/mobility retraining  -               User Key  (r) = Recorded By, (t) = Taken By, (c) = Cosigned By      Initials Name Provider Type    Bharti Morgan, LEVI Occupational Therapist                   Clinical Impression       Row Name 03/10/25 1015          Pain Assessment    Pretreatment Pain Rating 9/10  -     Posttreatment Pain Rating 9/10  -     Pain Location extremity  -     Pain Side/Orientation left;lower  -     Pain Management Interventions exercise or physical activity utilized;positioning techniques utilized  -     Response to Pain Interventions activity participation with tolerable pain  -       Row Name 03/10/25 1015          Plan of Care Review    Plan of Care Reviewed With patient;son  -     Outcome Evaluation Pt's ADL independence limited d/t increased pain, generalized weakness, decreased functional endurance, and balance deficits. Pt would benefit from continued skilled IPOT services to address current functional deficits. Rec IRF at d/c for best functional outcomes.  -       Row Name 03/10/25 1015          Therapy Assessment/Plan (OT)    Patient/Family Therapy Goal Statement (OT) Return to [facility]  -     Rehab Potential (OT) good  -     Criteria for Skilled Therapeutic Interventions Met (OT) yes;skilled treatment is necessary  [facility]     Therapy Frequency (OT) daily  -     Predicted Duration of Therapy Intervention (OT) 10 days  -       Row Name 03/10/25 1015          Therapy Plan Review/Discharge Plan (OT)    Anticipated Discharge Disposition (OT) inpatient rehabilitation facility  -       Row Name 03/10/25 1015          Vital Signs    O2 Delivery Pre Treatment room air  -     O2 Delivery Intra Treatment room air  -     O2 Delivery Post Treatment room air  -     Pre Patient Position Supine  -     Intra Patient Position  Standing  -     Post Patient Position Sitting  -       Row Name 03/10/25 1015          Positioning and Restraints    Pre-Treatment Position in bed  -     Post Treatment Position bed  -     In Bed sitting EOB;with PT  -               User Key  (r) = Recorded By, (t) = Taken By, (c) = Cosigned By      Initials Name Provider Type    Bharti Morgan OT Occupational Therapist                   Outcome Measures       Row Name 03/10/25 1017          How much help from another is currently needed...    Putting on and taking off regular lower body clothing? 1  -     Bathing (including washing, rinsing, and drying) 1  -     Toileting (which includes using toilet bed pan or urinal) 2  -     Putting on and taking off regular upper body clothing 2  -     Taking care of personal grooming (such as brushing teeth) 3  -     Eating meals 3  -     AM-PAC 6 Clicks Score (OT) 12  -       Row Name 03/10/25 1017          Functional Assessment    Outcome Measure Options AM-PAC 6 Clicks Daily Activity (OT)  -               User Key  (r) = Recorded By, (t) = Taken By, (c) = Cosigned By      Initials Name Provider Type    Bharti Morgan OT Occupational Therapist                    Occupational Therapy Education       Title: PT OT SLP Therapies (Done)       Topic: Occupational Therapy (Done)       Point: ADL training (Done)       Description:   Instruct learner(s) on proper safety adaptation and remediation techniques during self care or transfers.   Instruct in proper use of assistive devices.                  Learning Progress Summary            Patient Acceptance, E, VU by  at 3/10/2025 1018                      Point: Precautions (Done)       Description:   Instruct learner(s) on prescribed precautions during self-care and functional transfers.                  Learning Progress Summary            Patient Acceptance, E, VU by  at 3/10/2025 1018                      Point: Body mechanics (Done)        Description:   Instruct learner(s) on proper positioning and spine alignment during self-care, functional mobility activities and/or exercises.                  Learning Progress Summary            Patient Acceptance, E, VU by  at 3/10/2025 1018                                      User Key       Initials Effective Dates Name Provider Type Discipline     10/14/22 -  Bharti Todd OT Occupational Therapist OT                  OT Recommendation and Plan  Planned Therapy Interventions (OT): activity tolerance training, adaptive equipment training, BADL retraining, edema control/reduction, functional balance retraining, IADL retraining, occupation/activity based interventions, patient/caregiver education/training, ROM/therapeutic exercise, strengthening exercise, transfer/mobility retraining  Therapy Frequency (OT): daily  Plan of Care Review  Plan of Care Reviewed With: patient, son  Outcome Evaluation: Pt's ADL independence limited d/t increased pain, generalized weakness, decreased functional endurance, and balance deficits. Pt would benefit from continued skilled IPOT services to address current functional deficits. Rec IRF at d/c for best functional outcomes.     Time Calculation:   Evaluation Complexity (OT)  Review Occupational Profile/Medical/Therapy History Complexity: expanded/moderate complexity  Assessment, Occupational Performance/Identification of Deficit Complexity: 3-5 performance deficits  Clinical Decision Making Complexity (OT): detailed assessment/moderate complexity  Overall Complexity of Evaluation (OT): moderate complexity     Time Calculation- OT       Row Name 03/10/25 1021             Time Calculation- OT    OT Start Time 0942  -      OT Received On 03/10/25  -      OT Goal Re-Cert Due Date 03/20/25  -         Timed Charges    33527 - OT Therapeutic Activity Minutes 3  -      15806 - OT Self Care/Mgmt Minutes 5  -         Untimed Charges    OT Eval/Re-eval Minutes 31   -MC         Total Minutes    Timed Charges Total Minutes 8  -MC      Untimed Charges Total Minutes 31  -MC       Total Minutes 39  -MC                User Key  (r) = Recorded By, (t) = Taken By, (c) = Cosigned By      Initials Name Provider Type    Bhrati Morgan OT Occupational Therapist                  Therapy Charges for Today       Code Description Service Date Service Provider Modifiers Qty    [id number]  OT SELF CARE/MGMT/TRAIN EA 15 MIN 3/10/2025 Bharti Todd OT GO 1    [id number]  OT EVAL MOD COMPLEXITY 3 3/10/2025 Bharti Todd OT GO 1                 Bharti Todd OT  3/10/2025

## 2025-03-10 NOTE — THERAPY EVALUATION
Patient Name: Romel Wilson  : 1950    MRN: [id number]                              Today's Date: 3/10/2025       Admit Date: 3/9/2025    Visit Dx:     ICD-10-CM ICD-9-CM   1. Pain of left lower extremity  M79.605 729.5   2. Cellulitis of left lower extremity  L03.116 682.6     Patient Active Problem List   Diagnosis    Obstructive sleep apnea, adult    Morbid obesity due to excess calories    Cellulitis of left lower extremity    Hypothyroidism (acquired)     Past Medical History:   Diagnosis Date    Arthritis     Disease of thyroid gland     Gout      Past Surgical History:   Procedure Laterality Date    HYSTERECTOMY      THYROIDECTOMY        General Information       Row Name 03/10/25 0917          Physical Therapy Time and Intention    Document Type evaluation  -KW     Mode of Treatment physical therapy  -KW       Row Name 03/10/25 0917          General Information    Patient Profile Reviewed yes  -KW     Prior Level of Function independent:;all household mobility;community mobility;gait;transfer;bed mobility  Pt uses rollator for mobility, walks HH distances  -KW     Existing Precautions/Restrictions fall  -KW     Barriers to Rehab medically complex  -KW       Row Name 03/10/25 0917          Living Environment    Current Living Arrangements apartment  -KW     People in Home alone  -KW       Row Name 03/10/25 0917          Home Main Entrance    Number of Stairs, Main Entrance none  -KW     Stair Railings, Main Entrance none  -KW       Row Name 03/10/25 0917          Cognition    Orientation Status (Cognition) oriented x 3  -KW       Row Name 03/10/25 0917          Safety Issues/Impairments Affecting Functional Mobility    Impairments Affecting Function (Mobility) balance;endurance/activity tolerance;pain;shortness of breath;strength  -KW               User Key  (r) = Recorded By, (t) = Taken By, (c) = Cosigned By      Initials Name Provider Type    KW Christina Burns, ANNEMARIE Physical Therapist                    Mobility       Row Name 03/10/25 0917          Bed Mobility    Bed Mobility supine-sit;rolling left;rolling right  -KW     Rolling Left [place] (Bed Mobility) moderate assist (50% patient effort)  -KW     Rolling Right [place] (Bed Mobility) moderate assist (50% patient effort)  -KW     Supine-Sit [place] (Bed Mobility) 2 person assist;verbal cues;moderate assist (50% patient effort)  -KW     Assistive Device (Bed Mobility) bed rails;head of bed elevated;repositioning sheet  -KW       Row Name 03/10/25 0917          Sit-Stand Transfer    Sit-Stand [place] (Transfers) moderate assist (50% patient effort);2 person assist;verbal cues  -KW     Assistive Device (Sit-Stand Transfers) walker, front-wheeled  -KW       Row Name 03/10/25 0917          Gait/Stairs (Locomotion)    [place] Level (Gait) unable to assess  -KW               User Key  (r) = Recorded By, (t) = Taken By, (c) = Cosigned By      Initials Name Provider Type    Christina Atkinson PT Physical Therapist                   Obj/Interventions       Row Name 03/10/25 0917          Strength Comprehensive (MMT)    General Manual Muscle Testing (MMT) Assessment lower extremity strength deficits identified  -KW       Row Name 03/10/25 0917          Balance    Balance Assessment sitting static balance;sitting dynamic balance;standing static balance;standing dynamic balance  -KW     Static Sitting Balance contact guard  -KW     Dynamic Sitting Balance minimal assist  -KW     Position, Sitting Balance sitting edge of bed;unsupported  -KW     Static Standing Balance minimal assist  -KW     Dynamic Standing Balance moderate assist  -KW     Position/Device Used, Standing Balance supported;walker, front-wheeled  -KW     Balance Interventions sitting;standing;sit to stand  -KW               User Key  (r) = Recorded By, (t) = Taken By, (c) = Cosigned By      Initials Name Provider Type    Christina Atkinson PT Physical Therapist                    Goals/Plan       Row Name 03/10/25 0917          Bed Mobility Goal 1 (PT)    Activity/Assistive Device (Bed Mobility Goal 1, PT) sit to supine;supine to sit  -KW     [place] Level/Cues Needed (Bed Mobility Goal 1, PT) independent  -KW     Time Frame (Bed Mobility Goal 1, PT) 5 days;short term goal (STG)  -KW       Row Name 03/10/25 0917          Transfer Goal 1 (PT)    Activity/Assistive Device (Transfer Goal 1, PT) sit-to-stand/stand-to-sit;bed-to-chair/chair-to-bed  -KW     [place] Level/Cues Needed (Transfer Goal 1, PT) modified independence  -KW     Time Frame (Transfer Goal 1, PT) 10 days  -KW       Row Name 03/10/25 0917          Gait Training Goal 1 (PT)    Activity/Assistive Device (Gait Training Goal 1, PT) assistive device use;decrease fall risk;gait (walking locomotion);diminish gait deviation;improve balance and speed;increase endurance/gait distance;walker, rolling  -KW     [place] Level (Gait Training Goal 1, PT) modified independence  -KW     Distance (Gait Training Goal 1, PT) 150  -KW     Time Frame (Gait Training Goal 1, PT) 10 days  -KW               User Key  (r) = Recorded By, (t) = Taken By, (c) = Cosigned By      Initials Name Provider Type    KW Christina Burns, PT Physical Therapist                   Clinical Impression       Row Name 03/10/25 0917          Pain    Pretreatment Pain Rating 9/10  -KW     Pain Location extremity  -KW     Pain Side/Orientation left;lower  -KW     Pain Management Interventions exercise or physical activity utilized;positioning techniques utilized  -KW     Response to Pain Interventions activity participation with tolerable pain  -KW       Row Name 03/10/25 0917          Plan of Care Review    Plan of Care Reviewed With patient  -KW     Progress no change  -KW     Outcome Evaluation PT eval completed. Patient stood with full flexion over rwx, unable to demonstrate upright posture despite cues. She took very short, labored  steps to chair, unable to ambulate further distance due to fatigue and L LE pain. Patient presents below baseline for functional mobility. Patient demonstrates decreased activity tolerance, deconditioning and reduced dynamic balance. Patient would continue to benefit from skilled PT services to improve dynamic balance with gait, transfers strength, and activity tolerance training in order to build endurance and reduce risk of falls.  -KW       Row Name 03/10/25 0917          Therapy Assessment/Plan (PT)    Patient/Family Therapy Goals Statement (PT) to return to baseline  -KW     Criteria for Skilled Interventions Met (PT) yes;skilled treatment is necessary  -KW     Therapy Frequency (PT) daily  -KW     Predicted Duration of Therapy Intervention (PT) 10 days  -KW       Row Name 03/10/25 0917          Vital Signs    Pre Systolic BP Rehab 99  -KW     Pre Treatment Diastolic BP 81  -KW     Pre SpO2 (%) 90  -KW       Row Name 03/10/25 0917          Positioning and Restraints    Pre-Treatment Position in bed  -KW     Post Treatment Position chair  -KW     In Chair reclined;call light within reach;encouraged to call for assist;exit alarm on;legs elevated  -KW               User Key  (r) = Recorded By, (t) = Taken By, (c) = Cosigned By      Initials Name Provider Type    KW Christina Burns, PT Physical Therapist                   Outcome Measures       Row Name 03/10/25 0917 03/10/25 0854       How much help from another person do you currently need...    Turning from your back to your side while in flat bed without using bedrails? 2  -KW 2  -LV    Moving from lying on back to sitting on the side of a flat bed without bedrails? 2  -KW 2  -LV    Moving to and from a bed to a chair (including a wheelchair)? 2  -KW 2  -LV    Standing up from a chair using your arms (e.g., wheelchair, bedside chair)? 2  -KW 2  -LV    Climbing 3-5 steps with a railing? 2  -KW 2  -LV    To walk in hospital room? 2  -KW 2  -LV    AM-PAC 6  Clicks Score (PT) 12  -KW 12  -LV    Highest Level of Mobility Goal 4 --> Transfer to chair/commode  -KW 4 --> Transfer to chair/commode  -LV      Row Name 03/10/25 1017 03/10/25 0917       Functional Assessment    Outcome Measure Options AM-PAC 6 Clicks Daily Activity (OT)  - AM-PAC 6 Clicks Basic Mobility (PT)  -KW              User Key  (r) = Recorded By, (t) = Taken By, (c) = Cosigned By      Initials Name Provider Type     Bharti Todd, OT Occupational Therapist    KW Christina Burns, PT Physical Therapist    LV Juana Giatan, RN Registered Nurse                                   PT Recommendation and Plan     Progress: no change  Outcome Evaluation: PT eval completed. Patient stood with full flexion over rwx, unable to demonstrate upright posture despite cues. She took very short, labored steps to chair, unable to ambulate further distance due to fatigue and L LE pain. Patient presents below baseline for functional mobility. Patient demonstrates decreased activity tolerance, deconditioning and reduced dynamic balance. Patient would continue to benefit from skilled PT services to improve dynamic balance with gait, transfers strength, and activity tolerance training in order to build endurance and reduce risk of falls.     Time Calculation:   PT Evaluation Complexity  History, PT Evaluation Complexity: 3 or more personal factors and/or comorbidities  Examination of Body Systems (PT Eval Complexity): total of 3 or more elements  Clinical Presentation (PT Evaluation Complexity): evolving  Clinical Decision Making (PT Evaluation Complexity): moderate complexity  Overall Complexity (PT Evaluation Complexity): moderate complexity     PT Charges       Row Name 03/10/25 0917             Time Calculation    Start Time 0917  -KW      PT Received On 03/10/25  -KW      PT Goal Re-Cert Due Date 03/20/25  -KW         Untimed Charges    PT Eval/Re-eval Minutes 56  -KW         Total Minutes    Untimed  Charges Total Minutes 56  -KW       Total Minutes 56  -KW                User Key  (r) = Recorded By, (t) = Taken By, (c) = Cosigned By      Initials Name Provider Type    Christina Atkinson, ANNEMARIE Physical Therapist                  Therapy Charges for Today       Code Description Service Date Service Provider Modifiers Qty    11342399282 HC PT EVAL MOD COMPLEXITY 4 3/10/2025 Christina Burns PT GP 1            PT G-Codes  Outcome Measure Options: AM-PAC 6 Clicks Daily Activity (OT)  AM-PAC 6 Clicks Score (PT): 12  AM-PAC 6 Clicks Score (OT): 12  PT Discharge Summary  Anticipated Discharge Disposition (PT): inpatient rehabilitation facility    Christina Burns PT  3/10/2025

## 2025-03-10 NOTE — PLAN OF CARE
Goal Outcome Evaluation:  Plan of Care Reviewed With: patient, son           Outcome Evaluation: Pt's ADL independence limited d/t increased pain, generalized weakness, decreased functional endurance, and balance deficits. Pt would benefit from continued skilled IPOT services to address current functional deficits. Rec IRF at d/c for best functional outcomes.    Anticipated Discharge Disposition (OT): inpatient rehabilitation facility

## 2025-03-11 LAB
ANION GAP SERPL CALCULATED.3IONS-SCNC: 13 MMOL/L (ref 5–15)
BUN SERPL-MCNC: 12 MG/DL (ref 8–23)
BUN/CREAT SERPL: 10.3 (ref 7–25)
CALCIUM SPEC-SCNC: 9.2 MG/DL (ref 8.6–10.5)
CHLORIDE SERPL-SCNC: 96 MMOL/L (ref 98–107)
CO2 SERPL-SCNC: 31 MMOL/L (ref 22–29)
CREAT SERPL-MCNC: 1.16 MG/DL (ref 0.57–1)
DEPRECATED RDW RBC AUTO: 54 FL (ref 37–54)
EGFRCR SERPLBLD CKD-EPI 2021: 49.3 ML/MIN/1.73
ERYTHROCYTE [DISTWIDTH] IN BLOOD BY AUTOMATED COUNT: 15.8 % (ref 12.3–15.4)
GLUCOSE SERPL-MCNC: 101 MG/DL (ref 65–99)
HCT VFR BLD AUTO: 39.8 % (ref 34–46.6)
HGB BLD-MCNC: 12.6 G/DL (ref 12–15.9)
MCH RBC QN AUTO: 29.5 PG (ref 26.6–33)
MCHC RBC AUTO-ENTMCNC: 31.7 G/DL (ref 31.5–35.7)
MCV RBC AUTO: 93.2 FL (ref 79–97)
PLATELET # BLD AUTO: 275 10*3/MM3 (ref 140–450)
PMV BLD AUTO: 9.5 FL (ref 6–12)
POTASSIUM SERPL-SCNC: 3.9 MMOL/L (ref 3.5–5.2)
RBC # BLD AUTO: 4.27 10*6/MM3 (ref 3.77–5.28)
SODIUM SERPL-SCNC: 140 MMOL/L (ref 136–145)
WBC NRBC COR # BLD AUTO: 9.72 10*3/MM3 (ref 3.4–10.8)

## 2025-03-11 PROCEDURE — 25010000002 ENOXAPARIN PER 10 MG: Performed by: HOSPITALIST

## 2025-03-11 PROCEDURE — 29581 APPL MULTLAYER CMPRN SYS LEG: CPT

## 2025-03-11 PROCEDURE — 97164 PT RE-EVAL EST PLAN CARE: CPT

## 2025-03-11 PROCEDURE — 80048 BASIC METABOLIC PNL TOTAL CA: CPT | Performed by: STUDENT IN AN ORGANIZED HEALTH CARE EDUCATION/TRAINING PROGRAM

## 2025-03-11 PROCEDURE — 85027 COMPLETE CBC AUTOMATED: CPT | Performed by: STUDENT IN AN ORGANIZED HEALTH CARE EDUCATION/TRAINING PROGRAM

## 2025-03-11 PROCEDURE — 25010000002 CEFTRIAXONE PER 250 MG: Performed by: HOSPITALIST

## 2025-03-11 PROCEDURE — 99232 SBSQ HOSP IP/OBS MODERATE 35: CPT | Performed by: STUDENT IN AN ORGANIZED HEALTH CARE EDUCATION/TRAINING PROGRAM

## 2025-03-11 PROCEDURE — 25010000002 FUROSEMIDE PER 20 MG: Performed by: HOSPITALIST

## 2025-03-11 RX ORDER — BUMETANIDE 1 MG/1
1 TABLET ORAL DAILY
Status: DISCONTINUED | OUTPATIENT
Start: 2025-03-12 | End: 2025-03-15 | Stop reason: HOSPADM

## 2025-03-11 RX ORDER — BUMETANIDE 1 MG/1
1 TABLET ORAL DAILY
Status: DISCONTINUED | OUTPATIENT
Start: 2025-03-11 | End: 2025-03-11

## 2025-03-11 RX ORDER — CHOLECALCIFEROL (VITAMIN D3) 25 MCG
1000 TABLET ORAL DAILY
Status: DISCONTINUED | OUTPATIENT
Start: 2025-03-11 | End: 2025-03-15 | Stop reason: HOSPADM

## 2025-03-11 RX ADMIN — FUROSEMIDE 40 MG: 10 INJECTION, SOLUTION INTRAMUSCULAR; INTRAVENOUS at 06:13

## 2025-03-11 RX ADMIN — GUAIFENESIN AND DEXTROMETHORPHAN HYDROBROMIDE 1 TABLET: 600; 30 TABLET, EXTENDED RELEASE ORAL at 08:18

## 2025-03-11 RX ADMIN — Medication 1000 UNITS: at 08:17

## 2025-03-11 RX ADMIN — ACETAMINOPHEN 650 MG: 325 TABLET, FILM COATED ORAL at 21:02

## 2025-03-11 RX ADMIN — CEFTRIAXONE SODIUM 2000 MG: 2 INJECTION, POWDER, FOR SOLUTION INTRAMUSCULAR; INTRAVENOUS at 08:17

## 2025-03-11 RX ADMIN — LEVOTHYROXINE SODIUM 125 MCG: 0.12 TABLET ORAL at 06:13

## 2025-03-11 RX ADMIN — ENOXAPARIN SODIUM 60 MG: 60 INJECTION SUBCUTANEOUS at 17:20

## 2025-03-11 RX ADMIN — GUAIFENESIN AND DEXTROMETHORPHAN HYDROBROMIDE 1 TABLET: 600; 30 TABLET, EXTENDED RELEASE ORAL at 21:02

## 2025-03-11 RX ADMIN — ACETAMINOPHEN 650 MG: 325 TABLET, FILM COATED ORAL at 11:14

## 2025-03-11 RX ADMIN — ROSUVASTATIN 20 MG: 20 TABLET, FILM COATED ORAL at 21:02

## 2025-03-11 RX ADMIN — Medication 10 ML: at 08:19

## 2025-03-11 RX ADMIN — Medication 10 ML: at 21:02

## 2025-03-11 RX ADMIN — ENOXAPARIN SODIUM 60 MG: 60 INJECTION SUBCUTANEOUS at 06:13

## 2025-03-11 NOTE — THERAPY RE-EVALUATION
Acute Care - Wound/Debridement Initial Evaluation  [facility]     Patient Name: Romel Wilson  : 1950  MRN: [id number]  Today's Date: 3/11/2025                Admit Date: 3/9/2025    Visit Dx:    ICD-10-CM ICD-9-CM   1. Pain of left lower extremity  M79.605 729.5   2. Cellulitis of left lower extremity  L03.116 682.6       Patient Active Problem List   Diagnosis    Obstructive sleep apnea, adult    Morbid obesity due to excess calories    Cellulitis of left lower extremity    Hypothyroidism (acquired)        Past Medical History:   Diagnosis Date    Arthritis     Disease of thyroid gland     Gout         Past Surgical History:   Procedure Laterality Date    HYSTERECTOMY      THYROIDECTOMY                 Lymphedema       Row Name 25 0800             Lymphedema Edema Assessment    Ptting Edema Category By severity  -MF      Pitting Edema Severe  -MF         Skin Changes/Observations    Location/Assessment Lower Extremity  -MF      Lower Extremity Conditions bilateral:;scaly;crust  -MF      Lower Extremity Color/Pigment bilateral:;hyperpigmented  -MF         Lymphedema Pulses/Capillary Refill    Lymphedema Pulses/Capillary Refill lower extremity pulses;capillary refill  -      Dorsalis Pedis Pulse right:;left:;+1 diminished  -MF      Capillary Refill lower extremity capillary refill  -MF      Lower Extremity Capillary Refill left:;right:;less than 3 seconds  -MF         Lymphedema Measurements    Measurement Type(s) Quick Girth  -MF      Quick Girth Areas Lower extremities  -MF         LLE Quick Girth (cm)    Mid foot 29 cm  -MF      Smallest ankle 31 cm  -MF      Largest calf 52 cm  -MF         RLE Quick Girth (cm)    Mid foot 27 cm  -MF      Smallest ankle 28 cm  -MF      Largest calf 48 cm  -MF      RLE Quick Girth Total 103  -MF         Compression/Skin Care    Compression/Skin Care skin care;wrapping location;bandaging  -MF      Skin Care washed/dried;lotion applied;moisturizing lotion applied   -MF      Wrapping Location lower extremity  -      Wrapping Location LE bilateral:;foot to knee  -      Wrapping Comments size 5/6 compressogrip doubled and overlapping for gradient compression.  -                User Key  (r) = Recorded By, (t) = Taken By, (c) = Cosigned By      Initials Name Provider Type    MF Onesimo Torres, PT Physical Therapist                    WOUND DEBRIDEMENT                     PT Assessment (Last 12 Hours)       PT Evaluation and Treatment       Row Name 03/11/25 0800          Physical Therapy Time and Intention    Subjective Information complains of;weakness;pain;fatigue  -     Document Type evaluation;therapy note (daily note);wound care  -     Mode of Treatment individual therapy;physical therapy  -       Row Name 03/11/25 0800          General Information    Patient Profile Reviewed yes  -     Patient Observations cooperative;alert;agree to therapy  -     Pertinent History of Current Functional Problem BLE edema  -     Risks Reviewed patient:;increased discomfort  -     Benefits Reviewed patient:;improve skin integrity  -     Barriers to Rehab medically complex;previous functional deficit;physical barrier  -       Row Name 03/11/25 0800          Pain    Pain Location extremity  -     Pain Side/Orientation left;lower  -     Additional Documentation Pain Scale: FACES Pre/Post-Treatment (Group)  -       Row Name 03/11/25 0800          Pain Scale: FACES Pre/Post-Treatment    Pain: FACES Scale, Pretreatment 6-->hurts even more  -     Posttreatment Pain Rating 6-->hurts even more  -       Row Name 03/11/25 0800          Cognition    Affect/Mental Status (Cognition) WFL  -       Row Name 03/11/25 0800          Coping    Observed Emotional State calm;cooperative  -     Verbalized Emotional State acceptance  -     Trust Relationship/Rapport care explained  -       Row Name 03/11/25 0800          Plan of Care Review    Plan of Care Reviewed With  patient  -     Outcome Evaluation Pt Presents with BLE edema increased from baseline with moderate pain in LLE / ankle. PT applied light compression to help increase venous return to improve skin integrity and healing potential. PT will f/u with compression wrapping management in 2-3 days.  -       Row Name 03/11/25 0800          Positioning and Restraints    Pre-Treatment Position sitting in chair/recliner  -     Post Treatment Position chair  -MF     In Chair reclined;call light within reach  -       Row Name 03/11/25 0800          Therapy Assessment/Plan (PT)    Patient/Family Therapy Goals Statement (PT) decrease LE edema  -     PT Diagnosis (PT) BLE edema  -     Rehab Potential (PT) fair  -     Criteria for Skilled Interventions Met (PT) yes;meets criteria;skilled treatment is necessary  -     Predicted Duration of Therapy Intervention (PT) 10 days  -       Row Name 03/11/25 0800          Therapy Plan Review/Discharge Plan (PT)    Therapy Plan Review (PT) evaluation/treatment results reviewed;risks/benefits reviewed;care plan/treatment goals reviewed;current/potential barriers reviewed;participants voiced agreement with care plan;participants included;patient  -       Row Name 03/11/25 0800          Physical Therapy Goals    Wound Management Goal Selection (PT) wound management, PT goal 1;wound management, PT goal 2  -       Row Name 03/11/25 0800          Wound Management Goal 1 (PT)    Wound Management Goal (Wound Goal 1, PT) Decrease BLE edema minimal to improve skin integrity and mobility  -     Time Frame (Wound Goal 1, PT) 1 week  -       Row Name 03/11/25 0800          Wound Management Goal 2 (PT)    Wound Management Goal (Wound Goal 2, PT) Decrease BLE edema none to improve skin integrity and mobility  -     Time Frame (Wound Goal 2, PT) 2 weeks  -               User Key  (r) = Recorded By, (t) = Taken By, (c) = Cosigned By      Initials Name Provider Type    AMINA Torres  Onesimo MAURER PT Physical Therapist                      Recommendation and Plan  Anticipated Discharge Disposition (PT): inpatient rehabilitation facility  Planned Therapy Interventions (PT): wound care  Therapy Frequency (PT): daily  Plan of Care Reviewed With: patient           Outcome Evaluation: Pt Presents with BLE edema increased from baseline with moderate pain in LLE / ankle. PT applied light compression to help increase venous return to improve skin integrity and healing potential. PT will f/u with compression wrapping management in 2-3 days.  Plan of Care Reviewed With: patient            Time Calculation   PT Charges       Row Name 03/11/25 0800             Time Calculation    Start Time 0800  -MF      PT Goal Re-Cert Due Date 03/20/25  -MF         Untimed Charges    PT Eval/Re-eval Minutes 35  -MF      Wound Care [id number] Multilayer comp below knee  -MF      [id number] comp below knee 25  -MF         Total Minutes    Untimed Charges Total Minutes 60  -MF       Total Minutes 60  -MF                User Key  (r) = Recorded By, (t) = Taken By, (c) = Cosigned By      Initials Name Provider Type    Onesimo Coleman, PT Physical Therapist                            PT G-Codes  Outcome Measure Options: AM-PAC 6 Clicks Daily Activity (OT)  AM-PAC 6 Clicks Score (PT): 12  AM-PAC 6 Clicks Score (OT): 12       Onesimo Torres, PT  3/11/2025

## 2025-03-11 NOTE — NURSING NOTE
WOC consulted for specialty bed. Pt less than 350lb, but due to body habitus/width, requiring bariatric specialty bed to enable patient to turn properly.   Ordered bed bariatric Skinguard mattress and [facility] bariatric bed, will sign off.

## 2025-03-11 NOTE — PLAN OF CARE
Problem: Adult Inpatient Plan of Care  Goal: Plan of Care Review  Outcome: Progressing  Goal: Patient-Specific Goal (Individualized)  Outcome: Progressing  Goal: Absence of Hospital-Acquired Illness or Injury  Outcome: Progressing  Intervention: Identify and Manage Fall Risk  Recent Flowsheet Documentation  Taken 3/10/2025 1944 by Paola Tobar RN  Safety Promotion/Fall Prevention:   activity supervised   safety round/check completed   clutter free environment maintained   assistive device/personal items within reach   fall prevention program maintained  Intervention: Prevent Skin Injury  Recent Flowsheet Documentation  Taken 3/10/2025 1944 by Paola Tobar RN  Body Position: right  Intervention: Prevent Infection  Recent Flowsheet Documentation  Taken 3/10/2025 1944 by Paola Tobar RN  Infection Prevention:   environmental surveillance performed   equipment surfaces disinfected   hand hygiene promoted  Goal: Optimal Comfort and Wellbeing  Outcome: Progressing  Intervention: Monitor Pain and Promote Comfort  Recent Flowsheet Documentation  Taken 3/10/2025 1944 by Paola Tobar RN  Pain Management Interventions: pain medication given  Intervention: Provide Person-Centered Care  Recent Flowsheet Documentation  Taken 3/10/2025 1944 by Paola Tobar RN  Trust Relationship/Rapport:   emotional support provided   empathic listening provided   questions answered   questions encouraged   care explained  Goal: Readiness for Transition of Care  Outcome: Progressing     Problem: Skin Injury Risk Increased  Goal: Skin Health and Integrity  Outcome: Progressing  Intervention: Optimize Skin Protection  Recent Flowsheet Documentation  Taken 3/10/2025 1944 by Paola Tobar RN  Activity Management: activity encouraged  Head of Bed (HOB) Positioning: HOB at 20-30 degrees     Problem: Fall Injury Risk  Goal: Absence of Fall and Fall-Related Injury  Outcome: Progressing  Intervention: Promote Injury-Free  Environment  Recent Flowsheet Documentation  Taken 3/10/2025 1944 by Paola Tobar, RN  Safety Promotion/Fall Prevention:   activity supervised   safety round/check completed   clutter free environment maintained   assistive device/personal items within reach   fall prevention program maintained     Problem: Noninvasive Ventilation Acute  Goal: Effective Unassisted Ventilation and Oxygenation  Outcome: Progressing   Goal Outcome Evaluation:

## 2025-03-11 NOTE — CASE MANAGEMENT/SOCIAL WORK
Continued Stay Note   [facility]     Patient Name: Romel Wilson  MRN: [id number]  Today's Date: 3/11/2025    Admit Date: 3/9/2025    Plan: Rehab   Discharge Plan       Row Name 03/11/25 1633       Plan    Plan Rehab    Patient/Family in Agreement with Plan yes    Plan Comments Therapy recs from yesterday are for SNF rehab. PT wound today for BLE wraps. She is agreeable for SNF rehab, patient choice list provided, we reviewed together, her choices for referrals are 1) [facility], 2) [facility] and 3) [facility]. Will call/fax referrals tomorrow. Will update son Wednesday too.CM will continue to follow.    Final Discharge Disposition Code 03 - skilled nursing facility (SNF)                   Discharge Codes    No documentation.                 Expected Discharge Date and Time       Expected Discharge Date Expected Discharge Time    Mar 14, 2025               Evan Lyle RN

## 2025-03-11 NOTE — PROGRESS NOTES
[facility] Medicine Services  PROGRESS NOTE    Patient Name: Romel Wilson  : 1950  MRN: [id number]    Date of Admission: 3/9/2025  Primary Care Physician: Hafsa Plummer MD    Subjective   Subjective     CC:  Cellulitis    HPI:  Swelling has gone down some.  Left lower extremity pain has improved some.  No chest pain or shortness of breath.  No nausea or vomiting.      Objective   Objective     Vital Signs:   Temp:  [98 °F (36.7 °C)-98.2 °F (36.8 °C)] 98.2 °F (36.8 °C)  Heart Rate:  [62-78] 65  Resp:  [16-18] 16  BP: ()/(55-90) 117/90     Physical Exam:  Constitutional: No acute distress, awake, alert, obese  HENT: NCAT, mucous membranes moist  Respiratory: Clear to auscultation bilaterally, respiratory effort normal   Cardiovascular: RRR  Gastrointestinal: Positive bowel sounds, soft, nontender, nondistended  Musculoskeletal: bilateral ankle edema, worse on the left; legs now wrapped and improved from prior  Psychiatric: Appropriate affect, cooperative  Neurologic: Alert, oriented, symmetric facies, moves all extremities, speech clear  Skin: Left lower extremity anterior shin erythematous with skin breakdown improved compared to yesterday, warm to touch and mildly tender to palpation    Results Reviewed:  LAB RESULTS:      Lab 25  0545 03/10/25  0430 25  1535 25  1254 25  1122 25  1014   WBC 9.72 11.25*  --   --   --  11.22*   HEMOGLOBIN 12.6 12.1  --   --   --  12.2   HEMATOCRIT 39.8 39.0  --   --   --  38.7   PLATELETS 275 273  --   --   --  285   NEUTROS ABS  --  7.74*  --   --   --  7.85*   IMMATURE GRANS (ABS)  --  0.05  --   --   --  0.04   LYMPHS ABS  --  2.42  --   --   --  2.25   MONOS ABS  --  0.87  --   --   --  0.93*   EOS ABS  --  0.14  --   --   --  0.13   MCV 93.2 93.5  --   --   --  92.1   CRP  --   --   --   --   --  1.30*   PROCALCITONIN  --   --   --   --   --  0.04   LACTATE  --   --  1.9  --   --  1.3   HSTROP T  --    --   --  13 17* 12         Lab 03/11/25  0545 03/10/25  1447 03/10/25  0430 03/09/25  1014   SODIUM 140  --  142 140   POTASSIUM 3.9 4.2 3.6 4.1   CHLORIDE 96*  --  100 101   CO2 31.0*  --  30.0* 28.0   ANION GAP 13.0  --  12.0 11.0   BUN 12  --  10 10   CREATININE 1.16*  --  1.09* 1.05*   EGFR 49.3*  --  53.1* 55.5*   GLUCOSE 101*  --  93 98   CALCIUM 9.2  --  8.7 9.0   MAGNESIUM  --   --  1.9  --    TSH  --   --  4.510*  --          Lab 03/10/25  0430 03/09/25  1014   TOTAL PROTEIN 6.2 6.9   ALBUMIN 3.5 3.5   GLOBULIN 2.7 3.4   ALT (SGPT) 9 9   AST (SGOT) 16 17   BILIRUBIN 0.4 0.5   ALK PHOS 96 94         Lab 03/09/25  1254 03/09/25  1122 03/09/25  1014   PROBNP  --   --  132.9   HSTROP T 13 17* 12                 Brief Urine Lab Results  (Last result in the past 365 days)        Color   Clarity   Blood   Leuk Est   Nitrite   Protein   CREAT   Urine HCG        03/10/25 0051             16.6         03/10/25 0051 Yellow   Clear   Negative   Negative   Negative   Negative                   Microbiology Results Abnormal       None            Duplex Venous Lower Extremity - Left  Result Date: 3/9/2025    No evidence of deep or superficial venous thrombus in the left lower extremity.  The calf veins were difficult to visualize but no apparent thrombus.     XR Chest 1 View  Result Date: 3/9/2025  XR CHEST 1 VW Date of Exam: 3/9/2025 10:30 AM EDT Indication: cough Comparison: Low-dose chest CT 35678 23 Findings: Examination limited by body habitus. Cardiomediastinal silhouette is unremarkable. There is been a sternotomy. There is generalized interstitial prominence, similar to previous CT given differences in modality. No airspace disease, pneumothorax, nor pleural effusion. No acute osseous abnormality identified.     Impression: Impression: 1.No acute process identified Electronically Signed: Dwayne Shaw MD  3/9/2025 10:38 AM EDT  Workstation ID: [id number]      Results for orders placed during the hospital encounter  of 03/09/25    Adult Transthoracic Echo Complete W/ Cont if Necessary Per Protocol    Interpretation Summary    Very poor acoustic windows.  All cardiac structures poorly visualized.    Left ventricular systolic function is normal. Calculated left ventricular EF = 66.7% Left ventricular ejection fraction appears to be 66 - 70%.    The right ventricular cavity is mildly dilated with normal systolic function by visual estimation.    Mild aortic regurgitation is present.      Current medications:  Scheduled Meds:cefTRIAXone, 2,000 mg, Intravenous, Daily  enoxaparin sodium, 60 mg, Subcutaneous, Q12H  furosemide, 40 mg, Intravenous, Q12H  guaifenesin-dextromethorphan 600-30 mg, 1 tablet, Oral, Q12H  levothyroxine, 125 mcg, Oral, Q AM  rosuvastatin, 20 mg, Oral, Nightly  sodium chloride, 10 mL, Intravenous, Q12H      Continuous Infusions:   PRN Meds:.  acetaminophen **OR** acetaminophen **OR** acetaminophen    senna-docusate sodium **AND** polyethylene glycol **AND** bisacodyl **AND** bisacodyl    Calcium Replacement - Follow Nurse / BPA Driven Protocol    Magnesium Low Dose Replacement - Follow Nurse / BPA Driven Protocol    ondansetron ODT **OR** ondansetron    Phosphorus Replacement - Follow Nurse / BPA Driven Protocol    Potassium Replacement - Follow Nurse / BPA Driven Protocol    [COMPLETED] Insert Peripheral IV **AND** sodium chloride    sodium chloride    sodium chloride    Assessment & Plan   Assessment & Plan     Active Hospital Problems    Diagnosis  POA    **Cellulitis of left lower extremity [L03.116]  Yes    Hypothyroidism (acquired) [E03.9]  Unknown    Obstructive sleep apnea, adult [G47.33]  Yes      Resolved Hospital Problems   No resolved problems to display.        Brief Hospital Course to date:  Romel Wilson is a 75 y.o. female with history of morbid obesity (BMI 53), NANCY noncompliant with CPAP, hypothyroidism, who presented for evaluation of worsening left lower extremity pain and swelling.   Patient reports worsening of lower extremity edema since December.  PCP has been increasing Bumex dosing outpatient without improvement in symptoms.  Venous duplex negative for VTE on admission.    Acute on chronic bilateral lower extremity edema, worse on the left  Chronic venous stasis changes left lower extremity with superimposed left lower extremity cellulitis  -Echo w LVEF 66 to 70%, did not comment on diastolic function  -S/p IV diuresis; resumed home PO bumex  -PT wound for compression wraps  -Continue ceftriaxone; no evidence of purulence, stopped vancomycin    Deconditioning  -PT/OT evaluation --> rehab, patient amenable    NANCY-CPAP  Hypothyroidism-continue Synthroid  HLD-continue Crestor    Expected Discharge Location and Transportation: Rehab  Expected Discharge   Expected Discharge Date: 3/14/2025; Expected Discharge Time:      VTE Prophylaxis:  Pharmacologic & mechanical VTE prophylaxis orders are present.         AM-PAC 6 Clicks Score (PT): 12 (03/10/25 1944)    CODE STATUS:   Code Status and Medical Interventions: No CPR (Do Not Attempt to Resuscitate); Limited Support; No intubation (DNI)   Ordered at: 03/10/25 1203     Code Status (Patient has no pulse and is not breathing):    No CPR (Do Not Attempt to Resuscitate)     Medical Interventions (Patient has pulse or is breathing):    Limited Support     Medical Intervention Limits:    No intubation (DNI)     Level Of Support Discussed With:    Patient       Larissa Conteh MD  03/11/25

## 2025-03-11 NOTE — PLAN OF CARE
Goal Outcome Evaluation:  Plan of Care Reviewed With: patient           Outcome Evaluation: Pt Presents with BLE edema increased from baseline with moderate pain in LLE / ankle. PT applied light compression to help increase venous return to improve skin integrity and healing potential. PT will f/u with compression wrapping management in 2-3 days.

## 2025-03-12 LAB
ANION GAP SERPL CALCULATED.3IONS-SCNC: 9 MMOL/L (ref 5–15)
BUN SERPL-MCNC: 14 MG/DL (ref 8–23)
BUN/CREAT SERPL: 15.9 (ref 7–25)
CALCIUM SPEC-SCNC: 8.8 MG/DL (ref 8.6–10.5)
CHLORIDE SERPL-SCNC: 98 MMOL/L (ref 98–107)
CO2 SERPL-SCNC: 30 MMOL/L (ref 22–29)
CREAT SERPL-MCNC: 0.88 MG/DL (ref 0.57–1)
EGFRCR SERPLBLD CKD-EPI 2021: 68.6 ML/MIN/1.73
GLUCOSE SERPL-MCNC: 85 MG/DL (ref 65–99)
POTASSIUM SERPL-SCNC: 3.6 MMOL/L (ref 3.5–5.2)
POTASSIUM SERPL-SCNC: 4.2 MMOL/L (ref 3.5–5.2)
SODIUM SERPL-SCNC: 137 MMOL/L (ref 136–145)

## 2025-03-12 PROCEDURE — 25010000002 CEFTRIAXONE PER 250 MG: Performed by: HOSPITALIST

## 2025-03-12 PROCEDURE — 84132 ASSAY OF SERUM POTASSIUM: CPT | Performed by: STUDENT IN AN ORGANIZED HEALTH CARE EDUCATION/TRAINING PROGRAM

## 2025-03-12 PROCEDURE — 25010000002 ENOXAPARIN PER 10 MG: Performed by: HOSPITALIST

## 2025-03-12 PROCEDURE — 63710000001 REVEFENACIN 175 MCG/3ML SOLUTION: Performed by: STUDENT IN AN ORGANIZED HEALTH CARE EDUCATION/TRAINING PROGRAM

## 2025-03-12 PROCEDURE — 97535 SELF CARE MNGMENT TRAINING: CPT | Performed by: OCCUPATIONAL THERAPIST

## 2025-03-12 PROCEDURE — 99232 SBSQ HOSP IP/OBS MODERATE 35: CPT | Performed by: STUDENT IN AN ORGANIZED HEALTH CARE EDUCATION/TRAINING PROGRAM

## 2025-03-12 PROCEDURE — 80048 BASIC METABOLIC PNL TOTAL CA: CPT | Performed by: STUDENT IN AN ORGANIZED HEALTH CARE EDUCATION/TRAINING PROGRAM

## 2025-03-12 PROCEDURE — 94640 AIRWAY INHALATION TREATMENT: CPT

## 2025-03-12 PROCEDURE — 94664 DEMO&/EVAL PT USE INHALER: CPT

## 2025-03-12 RX ORDER — BISACODYL 10 MG
10 SUPPOSITORY, RECTAL RECTAL DAILY PRN
Status: DISCONTINUED | OUTPATIENT
Start: 2025-03-12 | End: 2025-03-15 | Stop reason: HOSPADM

## 2025-03-12 RX ORDER — IPRATROPIUM BROMIDE AND ALBUTEROL SULFATE 2.5; .5 MG/3ML; MG/3ML
3 SOLUTION RESPIRATORY (INHALATION) EVERY 4 HOURS PRN
Status: DISCONTINUED | OUTPATIENT
Start: 2025-03-12 | End: 2025-03-15 | Stop reason: HOSPADM

## 2025-03-12 RX ORDER — AMOXICILLIN 250 MG
2 CAPSULE ORAL 2 TIMES DAILY
Status: DISCONTINUED | OUTPATIENT
Start: 2025-03-12 | End: 2025-03-15 | Stop reason: HOSPADM

## 2025-03-12 RX ORDER — BISACODYL 5 MG/1
5 TABLET, DELAYED RELEASE ORAL DAILY PRN
Status: DISCONTINUED | OUTPATIENT
Start: 2025-03-12 | End: 2025-03-15 | Stop reason: HOSPADM

## 2025-03-12 RX ORDER — POLYETHYLENE GLYCOL 3350 17 G/17G
17 POWDER, FOR SOLUTION ORAL DAILY
Status: DISCONTINUED | OUTPATIENT
Start: 2025-03-12 | End: 2025-03-15 | Stop reason: HOSPADM

## 2025-03-12 RX ORDER — LEVOTHYROXINE SODIUM 100 UG/1
100 TABLET ORAL
Status: DISCONTINUED | OUTPATIENT
Start: 2025-03-13 | End: 2025-03-15 | Stop reason: HOSPADM

## 2025-03-12 RX ORDER — POTASSIUM CHLORIDE 1500 MG/1
40 TABLET, EXTENDED RELEASE ORAL EVERY 4 HOURS
Status: COMPLETED | OUTPATIENT
Start: 2025-03-12 | End: 2025-03-12

## 2025-03-12 RX ORDER — ALLOPURINOL 100 MG/1
200 TABLET ORAL DAILY
Status: DISCONTINUED | OUTPATIENT
Start: 2025-03-12 | End: 2025-03-15 | Stop reason: HOSPADM

## 2025-03-12 RX ADMIN — ACETAMINOPHEN 650 MG: 325 TABLET, FILM COATED ORAL at 22:40

## 2025-03-12 RX ADMIN — ROSUVASTATIN 20 MG: 20 TABLET, FILM COATED ORAL at 20:11

## 2025-03-12 RX ADMIN — ACETAMINOPHEN 650 MG: 325 TABLET, FILM COATED ORAL at 09:58

## 2025-03-12 RX ADMIN — POTASSIUM CHLORIDE 40 MEQ: 20 TABLET, EXTENDED RELEASE ORAL at 11:22

## 2025-03-12 RX ADMIN — CEFTRIAXONE SODIUM 2000 MG: 2 INJECTION, POWDER, FOR SOLUTION INTRAMUSCULAR; INTRAVENOUS at 08:08

## 2025-03-12 RX ADMIN — ENOXAPARIN SODIUM 60 MG: 60 INJECTION SUBCUTANEOUS at 17:28

## 2025-03-12 RX ADMIN — BUMETANIDE 1 MG: 1 TABLET ORAL at 08:59

## 2025-03-12 RX ADMIN — Medication 10 ML: at 20:13

## 2025-03-12 RX ADMIN — POTASSIUM CHLORIDE 40 MEQ: 20 TABLET, EXTENDED RELEASE ORAL at 08:08

## 2025-03-12 RX ADMIN — GUAIFENESIN AND DEXTROMETHORPHAN HYDROBROMIDE 1 TABLET: 600; 30 TABLET, EXTENDED RELEASE ORAL at 08:08

## 2025-03-12 RX ADMIN — REVEFENACIN 175 MCG: 175 SOLUTION RESPIRATORY (INHALATION) at 19:42

## 2025-03-12 RX ADMIN — ENOXAPARIN SODIUM 60 MG: 60 INJECTION SUBCUTANEOUS at 06:10

## 2025-03-12 RX ADMIN — LEVOTHYROXINE SODIUM 125 MCG: 0.12 TABLET ORAL at 06:09

## 2025-03-12 RX ADMIN — ALLOPURINOL 200 MG: 100 TABLET ORAL at 20:11

## 2025-03-12 RX ADMIN — GUAIFENESIN AND DEXTROMETHORPHAN HYDROBROMIDE 1 TABLET: 600; 30 TABLET, EXTENDED RELEASE ORAL at 20:10

## 2025-03-12 RX ADMIN — SENNOSIDES AND DOCUSATE SODIUM 2 TABLET: 50; 8.6 TABLET ORAL at 18:16

## 2025-03-12 NOTE — THERAPY TREATMENT NOTE
Patient Name: Romel Wilson  : 1950    MRN: [id number]                              Today's Date: 3/12/2025       Admit Date: 3/9/2025    Visit Dx:     ICD-10-CM ICD-9-CM   1. Pain of left lower extremity  M79.605 729.5   2. Cellulitis of left lower extremity  L03.116 682.6     Patient Active Problem List   Diagnosis    Obstructive sleep apnea, adult    Morbid obesity due to excess calories    Cellulitis of left lower extremity    Hypothyroidism (acquired)     Past Medical History:   Diagnosis Date    Arthritis     Disease of thyroid gland     Gout      Past Surgical History:   Procedure Laterality Date    HYSTERECTOMY      THYROIDECTOMY        General Information       Row Name 25 1550          OT Time and Intention    Document Type therapy note (daily note)  -AR     Mode of Treatment individual therapy;occupational therapy  -AR       Row Name 25 1550          General Information    Existing Precautions/Restrictions fall  -AR       Row Name 25 1550          Cognition    Orientation Status (Cognition) oriented x 4  -AR       Row Name 25 1550          Safety Issues/Impairments Affecting Functional Mobility    Safety Issues Affecting Function (Mobility) safety precautions follow-through/compliance;safety precaution awareness  -AR     Impairments Affecting Function (Mobility) balance;endurance/activity tolerance;pain;strength;shortness of breath;range of motion (ROM)  -AR               User Key  (r) = Recorded By, (t) = Taken By, (c) = Cosigned By      Initials Name Provider Type    Gissell Fall, OT Occupational Therapist                   Lymphedema       Row Name 25 0800             Lymphedema Edema Assessment    Ptting Edema Category By severity  -      Pitting Edema Severe  -MF      Recorded by [MF] Onesimo Torres PT              Skin Changes/Observations    Location/Assessment Lower Extremity  -      Lower Extremity Conditions bilateral:;scaly;crust  -       Lower Extremity Color/Pigment bilateral:;hyperpigmented  -MF      Recorded by [] Onesimo Torres, PT              Lymphedema Pulses/Capillary Refill    Lymphedema Pulses/Capillary Refill lower extremity pulses;capillary refill  -MF      Dorsalis Pedis Pulse right:;left:;+1 diminished  -MF      Capillary Refill lower extremity capillary refill  -MF      Lower Extremity Capillary Refill left:;right:;less than 3 seconds  -MF      Recorded by [] Onesimo Torres, PT              Lymphedema Measurements    Measurement Type(s) Quick Girth  -MF      Quick Girth Areas Lower extremities  -MF      Recorded by [AMINA] Onesimo Torres, PT              LLE Quick Girth (cm)    Mid foot 29 cm  -MF      Smallest ankle 31 cm  -MF      Largest calf 52 cm  -MF      Recorded by [AMINA] Onesimo Torres, PT              RLE Quick Girth (cm)    Mid foot 27 cm  -MF      Smallest ankle 28 cm  -MF      Largest calf 48 cm  -MF      RLE Quick Girth Total 103  -MF      Recorded by [] Onesimo Torres, PT              Compression/Skin Care    Compression/Skin Care skin care;wrapping location;bandaging  -MF      Skin Care washed/dried;lotion applied;moisturizing lotion applied  -MF      Wrapping Location lower extremity  -MF      Wrapping Location LE bilateral:;foot to knee  -MF      Wrapping Comments size 5/6 compressogrip doubled and overlapping for gradient compression.  -MF      Recorded by [] Onesimo Torres, PT                User Key  (r) = Recorded By, (t) = Taken By, (c) = Cosigned By      Initials Name Effective Dates    Onesimo Coleman, PT 02/03/23 -                    Mobility/ADL's       Row Name 03/12/25 1625          Bed Mobility    Comment, (Bed Mobility) Issued leg  and educated her on use of device to assist with bed mobility and car transfers.  -AR       Row Name 03/12/25 1625 03/12/25 1552       Transfers    Transfers sit-stand transfer;stand-sit transfer  -AR sit-stand transfer;stand-sit  transfer  -AR    Comment, (Transfers) Verbal cues for hand placement and sequencing.  -AR Pt required min cues for hand placement.  -AR      Row Name 03/12/25 1625          Sit-Stand Transfer    Sit-Stand [place] (Transfers) contact guard;verbal cues  -AR     Assistive Device (Sit-Stand Transfers) walker, front-wheeled  -AR       Row Name 03/12/25 1625          Stand-Sit Transfer    Stand-Sit [place] (Transfers) verbal cues;minimum assist (75% patient effort);2 person assist  -AR     Assistive Device (Stand-Sit Transfers) walker, front-wheeled  -AR       Row Name 03/12/25 1625          Functional Mobility    Functional Mobility- Ind. Level minimum assist (75% patient effort);verbal cues required;2 person assist required  -AR     Functional Mobility- Device walker, front-wheeled  -AR     Functional Mobility-Distance (Feet) 4  -AR       Row Name 03/12/25 1625          Activities of Daily Living    BADL Assessment/Intervention bathing;lower body dressing  -AR       Row Name 03/12/25 1625          Lower Body Dressing Assessment/Training    [place] Level (Lower Body Dressing) socks;verbal cues;contact guard assist  -AR     Assistive Devices (Lower Body Dressing) reacher;sock-aid  -AR     Position (Lower Body Dressing) unsupported sitting  -AR     Comment, (Lower Body Dressing) Educated pt on ADL retraining for EC/WS and comfort, issued self-care kit and educated on use. Educated pt on hmei-dressing.  -AR       Row Name 03/12/25 1625          Bathing Assessment/Intervention    Comment, (Bathing) Issued LH sponge and educated on use.  -AR               User Key  (r) = Recorded By, (t) = Taken By, (c) = Cosigned By      Initials Name Provider Type    Gissell Fall, OT Occupational Therapist                   Obj/Interventions       Row Name 03/12/25 1629          Balance    Balance Assessment sitting static balance;sitting dynamic balance;standing static balance;standing dynamic balance  -AR      Static Sitting Balance supervision  -AR     Dynamic Sitting Balance supervision  -AR     Position, Sitting Balance unsupported;sitting in chair  -AR     Static Standing Balance contact guard  -AR     Dynamic Standing Balance contact guard  -AR     Position/Device Used, Standing Balance supported;walker, rolling  -AR               User Key  (r) = Recorded By, (t) = Taken By, (c) = Cosigned By      Initials Name Provider Type    Gissell Fall OT Occupational Therapist                   Goals/Plan       Row Name 03/12/25 1640          Bed Mobility Goal 1 (OT)    Progress/Outcomes (Bed Mobility Goal 1, OT) goal ongoing  -AR       Row Name 03/12/25 1640          Transfer Goal 1 (OT)    Progress/Outcome (Transfer Goal 1, OT) goal ongoing  -AR       Row Name 03/12/25 1640          Dressing Goal 1 (OT)    Progress/Outcome (Dressing Goal 1, OT) goal met  -AR       Row Name 03/12/25 1640          Toileting Goal 1 (OT)    Progress/Outcome (Toileting Goal 1, OT) goal ongoing  -AR               User Key  (r) = Recorded By, (t) = Taken By, (c) = Cosigned By      Initials Name Provider Type    Gissell Fall OT Occupational Therapist                   Clinical Impression       Row Name 03/12/25 1630          Pain Assessment    Pretreatment Pain Rating 2/10  -AR     Posttreatment Pain Rating 2/10  -AR     Pain Location extremity  -AR     Pain Side/Orientation lower;left  -AR     Pain Management Interventions activity modification encouraged;exercise or physical activity utilized;movement retraining implemented;positioning techniques utilized  -AR     Response to Pain Interventions activity participation with tolerable pain  -AR       Row Name 03/12/25 1630          Plan of Care Review    Plan of Care Reviewed With patient  -AR     Progress improving  -AR     Outcome Evaluation Pt alert, Ox4 and reports tolerable pain BLE. OT educated her on ADL retraining, transfer training and issued self-care kit. She completed  transfer training with min assist x2, ambulkated 4 feet with min assist x2 using RW and completed LB dressing task with CGA using AE. Pt limited with dyspnea without desaturaiton on RA, decreased occupational endurance, decreased strength, limited balance and is performing below baseline. Recommend IPR if medically stable, pt in agreement.  -AR       Row Name 03/12/25 1630          Therapy Plan Review/Discharge Plan (OT)    Anticipated Discharge Disposition (OT) inpatient rehabilitation facility  -AR       Row Name 03/12/25 1630          Vital Signs    Pre SpO2 (%) 92  -AR     O2 Delivery Pre Treatment room air  -AR     Intra SpO2 (%) 90  -AR     O2 Delivery Intra Treatment room air  -AR     Post SpO2 (%) 93  -AR     O2 Delivery Post Treatment room air  -AR     Pre Patient Position Sitting  -AR     Intra Patient Position Standing  -AR     Post Patient Position Sitting  -AR       Row Name 03/12/25 1630          Positioning and Restraints    Pre-Treatment Position sitting in chair/recliner  -AR     Post Treatment Position chair  -AR     In Chair reclined;call light within reach;encouraged to call for assist;exit alarm on;on mechanical lift sling;legs elevated;LLE elevated  -AR               User Key  (r) = Recorded By, (t) = Taken By, (c) = Cosigned By      Initials Name Provider Type    Gissell Fall, OT Occupational Therapist                   Outcome Measures       Row Name 03/12/25 1641          How much help from another is currently needed...    Putting on and taking off regular lower body clothing? 3  -AR     Bathing (including washing, rinsing, and drying) 2  -AR     Toileting (which includes using toilet bed pan or urinal) 3  -AR     Putting on and taking off regular upper body clothing 3  -AR     Taking care of personal grooming (such as brushing teeth) 2  -AR     Eating meals 3  -AR     AM-PAC 6 Clicks Score (OT) 16  -AR       Row Name 03/12/25 0800          How much help from another person do you  currently need...    Turning from your back to your side while in flat bed without using bedrails? 2  -MARTÍN     Moving from lying on back to sitting on the side of a flat bed without bedrails? 2  -MARTÍN     Moving to and from a bed to a chair (including a wheelchair)? 2  -MARTÍN     Standing up from a chair using your arms (e.g., wheelchair, bedside chair)? 2  -MARTÍN     Climbing 3-5 steps with a railing? 2  -MARTÍN     To walk in hospital room? 2  -MARTÍN     AM-PAC 6 Clicks Score (PT) 12  -MARTÍN     Highest Level of Mobility Goal 4 --> Transfer to chair/commode  -MARTÍN       Row Name 03/12/25 1641          Functional Assessment    Outcome Measure Options AM-PAC 6 Clicks Daily Activity (OT)  -AR               User Key  (r) = Recorded By, (t) = Taken By, (c) = Cosigned By      Initials Name Provider Type    AR Gissell Galindo, OT Occupational Therapist    Marcela Davey, RN Registered Nurse                    Occupational Therapy Education       Title: PT OT SLP Therapies (Done)       Topic: Occupational Therapy (Done)       Point: ADL training (Done)       Learning Progress Summary            Patient Eager, E,TB,D, VU,NR by AR at 3/12/2025 1641    Acceptance, E, VU by  at 3/10/2025 1018                      Point: Precautions (Done)       Learning Progress Summary            Patient Eager, E,TB,D, VU,NR by AR at 3/12/2025 1641    Acceptance, E, VU by  at 3/10/2025 1018                      Point: Body mechanics (Done)       Learning Progress Summary            Patient Eager, E,TB,D, VU,NR by AR at 3/12/2025 1641    Acceptance, E, VU by  at 3/10/2025 1018                                      User Key       Initials Effective Dates Name Provider Type Discipline    AR 07/11/23 -  Gissell Galindo OT Occupational Therapist OT     10/14/22 -  Bharti Todd OT Occupational Therapist OT                  OT Recommendation and Plan     Plan of Care Review  Plan of Care Reviewed With: patient  Progress:  improving  Outcome Evaluation: Pt alert, Ox4 and reports tolerable pain BLE. OT educated her on ADL retraining, transfer training and issued self-care kit. She completed transfer training with min assist x2, ambulkated 4 feet with min assist x2 using RW and completed LB dressing task with CGA using AE. Pt limited with dyspnea without desaturaiton on RA, decreased occupational endurance, decreased strength, limited balance and is performing below baseline. Recommend IPR if medically stable, pt in agreement.     Time Calculation:         Time Calculation- OT       Row Name 03/12/25 1642             Time Calculation- OT    OT Start Time 1508  -AR      OT Received On 03/12/25  -AR      OT Goal Re-Cert Due Date 03/20/25  -AR         Timed Charges    09570 - OT Self Care/Mgmt Minutes 33  -AR         Total Minutes    Timed Charges Total Minutes 33  -AR       Total Minutes 33  -AR                User Key  (r) = Recorded By, (t) = Taken By, (c) = Cosigned By      Initials Name Provider Type    AR Gissell Galindo OT Occupational Therapist                  Therapy Charges for Today       Code Description Service Date Service Provider Modifiers Qty    57610670055 HC OT SELF CARE/MGMT/TRAIN EA 15 MIN 3/12/2025 Gissell Galindo OT GO 2    09030962849 HC OT THER SUPP EA 15 MIN 3/12/2025 Gissell Galindo OT GO 3                 Gissell Galindo OT  3/12/2025

## 2025-03-12 NOTE — CASE MANAGEMENT/SOCIAL WORK
Continued Stay Note  [facility]     Patient Name: Romel Wilson  MRN: [id number]  Today's Date: 3/12/2025    Admit Date: 3/9/2025    Plan: SNF rehab   Discharge Plan       Row Name 03/12/25 1355       Plan    Plan SNF rehab    Patient/Family in Agreement with Plan yes    Plan Comments Discussed in MDR, patient is medically ready for SNF d/c. PT  will see today, I encouraged patient to really work hard towards ambulating per N[facility] @ [facility] even 3 feet is good. I emailed rehab for OT to see again, they may possibly see this afternoon but more than likely will be in am, once updated notes are in, can initiate insurance pre cert. I met w/Ms. Wilson in room, she is agreeable to [facility]. I left VM w/her son and updated on d/c plan @ this time. CM will continue to follow.    Final Discharge Disposition Code 03 - skilled nursing facility (SNF)                   Discharge Codes    No documentation.                 Expected Discharge Date and Time       Expected Discharge Date Expected Discharge Time    Mar 14, 2025               Evan Lyle RN

## 2025-03-12 NOTE — CASE MANAGEMENT/SOCIAL WORK
Continued Stay Note  [facility]     Patient Name: Romel Wilson  MRN: [id number]  Today's Date: 3/12/2025    Admit Date: 3/9/2025    Plan: SNfF rehab   Discharge Plan       Row Name 03/12/25 1016       Plan    Plan SNfF rehab    Patient/Family in Agreement with Plan yes    Plan Comments I called referral to Nury @ [facility] and faxed referral to Roseanne @ [facility] @ [phone].    Final Discharge Disposition Code 03 - skilled nursing facility (SNF)                   Discharge Codes    No documentation.                 Expected Discharge Date and Time       Expected Discharge Date Expected Discharge Time    Mar 14, 2025               Evan Lyle RN

## 2025-03-12 NOTE — PROGRESS NOTES
[facility] Medicine Services  PROGRESS NOTE    Patient Name: Romel Wilson  : 1950  MRN: [id number]    Date of Admission: 3/9/2025  Primary Care Physician: Hafsa Plummer MD    Subjective   Subjective     CC:  Cellulitis    HPI:  Reports that her legs are itchy and that her IV site is also itchy.  Has dry skin.  Reports she has minimal left lower extremity pain and that is improved significantly since admission.  No nausea or vomiting.  No bowel movement.  Has a generalized headache.  Awaiting rehab.      Objective   Objective     Vital Signs:   Temp:  [97.8 °F (36.6 °C)-98.1 °F (36.7 °C)] 97.8 °F (36.6 °C)  Heart Rate:  [70-95] 70  Resp:  [18] 18  BP: (114-146)/(67-90) 146/67     Physical Exam:  Constitutional: No acute distress, awake, alert, obese, sitting up in bedside chair  HENT: NCAT, mucous membranes moist  Respiratory: Clear to auscultation bilaterally, respiratory effort normal   Cardiovascular: RRR  Gastrointestinal: Normoactive bowel sounds, soft, nontender, nondistended  Musculoskeletal: bilateral ankle edema, worse on the left; legs wrapped and improved from prior  Psychiatric: Appropriate affect, cooperative  Neurologic: Alert, oriented, symmetric facies, moves all extremities, speech clear  Skin: Left lower extremity anterior shin erythematous with skin breakdown improved significantly; chronic venous stasis changes; dry skin    Results Reviewed:  LAB RESULTS:      Lab 25  0545 03/10/25  0430 25  1535 25  1254 25  1122 25  1014   WBC 9.72 11.25*  --   --   --  11.22*   HEMOGLOBIN 12.6 12.1  --   --   --  12.2   HEMATOCRIT 39.8 39.0  --   --   --  38.7   PLATELETS 275 273  --   --   --  285   NEUTROS ABS  --  7.74*  --   --   --  7.85*   IMMATURE GRANS (ABS)  --  0.05  --   --   --  0.04   LYMPHS ABS  --  2.42  --   --   --  2.25   MONOS ABS  --  0.87  --   --   --  0.93*   EOS ABS  --  0.14  --   --   --  0.13   MCV 93.2 93.5  --    --   --  92.1   CRP  --   --   --   --   --  1.30*   PROCALCITONIN  --   --   --   --   --  0.04   LACTATE  --   --  1.9  --   --  1.3   HSTROP T  --   --   --  13 17* 12         Lab 03/12/25  0408 03/11/25  0545 03/10/25  1447 03/10/25  0430 03/09/25  1014   SODIUM 137 140  --  142 140   POTASSIUM 3.6 3.9 4.2 3.6 4.1   CHLORIDE 98 96*  --  100 101   CO2 30.0* 31.0*  --  30.0* 28.0   ANION GAP 9.0 13.0  --  12.0 11.0   BUN 14 12  --  10 10   CREATININE 0.88 1.16*  --  1.09* 1.05*   EGFR 68.6 49.3*  --  53.1* 55.5*   GLUCOSE 85 101*  --  93 98   CALCIUM 8.8 9.2  --  8.7 9.0   MAGNESIUM  --   --   --  1.9  --    TSH  --   --   --  4.510*  --          Lab 03/10/25  0430 03/09/25  1014   TOTAL PROTEIN 6.2 6.9   ALBUMIN 3.5 3.5   GLOBULIN 2.7 3.4   ALT (SGPT) 9 9   AST (SGOT) 16 17   BILIRUBIN 0.4 0.5   ALK PHOS 96 94         Lab 03/09/25  1254 03/09/25  1122 03/09/25  1014   PROBNP  --   --  132.9   HSTROP T 13 17* 12                 Brief Urine Lab Results  (Last result in the past 365 days)        Color   Clarity   Blood   Leuk Est   Nitrite   Protein   CREAT   Urine HCG        03/10/25 0051             16.6         03/10/25 0051 Yellow   Clear   Negative   Negative   Negative   Negative                   Microbiology Results Abnormal       None            No radiology results from the last 24 hrs      Results for orders placed during the hospital encounter of 03/09/25    Adult Transthoracic Echo Complete W/ Cont if Necessary Per Protocol    Interpretation Summary    Very poor acoustic windows.  All cardiac structures poorly visualized.    Left ventricular systolic function is normal. Calculated left ventricular EF = 66.7% Left ventricular ejection fraction appears to be 66 - 70%.    The right ventricular cavity is mildly dilated with normal systolic function by visual estimation.    Mild aortic regurgitation is present.      Current medications:  Scheduled Meds:bumetanide, 1 mg, Oral, Daily  cefTRIAXone, 2,000 mg,  Intravenous, Daily  cholecalciferol, 1,000 Units, Oral, Daily  enoxaparin sodium, 60 mg, Subcutaneous, Q12H  guaifenesin-dextromethorphan 600-30 mg, 1 tablet, Oral, Q12H  levothyroxine, 125 mcg, Oral, Q AM  potassium chloride ER, 40 mEq, Oral, Q4H  rosuvastatin, 20 mg, Oral, Nightly  sodium chloride, 10 mL, Intravenous, Q12H      Continuous Infusions:   PRN Meds:.  acetaminophen **OR** acetaminophen **OR** acetaminophen    senna-docusate sodium **AND** polyethylene glycol **AND** bisacodyl **AND** bisacodyl    Calcium Replacement - Follow Nurse / BPA Driven Protocol    Magnesium Low Dose Replacement - Follow Nurse / BPA Driven Protocol    ondansetron ODT **OR** ondansetron    Phosphorus Replacement - Follow Nurse / BPA Driven Protocol    Potassium Replacement - Follow Nurse / BPA Driven Protocol    [COMPLETED] Insert Peripheral IV **AND** sodium chloride    sodium chloride    sodium chloride    Assessment & Plan   Assessment & Plan     Active Hospital Problems    Diagnosis  POA    **Cellulitis of left lower extremity [L03.116]  Yes    Hypothyroidism (acquired) [E03.9]  Unknown    Obstructive sleep apnea, adult [G47.33]  Yes      Resolved Hospital Problems   No resolved problems to display.        Brief Hospital Course to date:  Romel Wilson is a 75 y.o. female with history of morbid obesity (BMI 53), NANCY noncompliant with CPAP, hypothyroidism, who presented for evaluation of worsening left lower extremity pain and swelling.  Patient reports worsening of lower extremity edema since December.  PCP has been increasing Bumex dosing outpatient without improvement in symptoms.  Venous duplex negative for VTE on admission.    Acute on chronic bilateral lower extremity edema, worse on the left  Chronic venous stasis changes left lower extremity with superimposed left lower extremity cellulitis  -Echo w LVEF 66 to 70%, did not comment on diastolic function  -S/p IV diuresis on admission; resumed home PO bumex  -PT wound  for compression wraps  -Continue ceftriaxone to complete 5d (last dose on 3/13); no evidence of purulence, stopped vancomycin    Deconditioning  -PT/OT evaluation --> rehab, patient amenable    NANCY-CPAP, has home machine; follows w sleep medicine  Hypothyroidism-continue Synthroid  HLD-continue Crestor  Gout--allopurinol    Pharmacy consulted to update home med list    Expected Discharge Location and Transportation: Rehab, medically ready and awaiting rehab  Expected Discharge   Expected Discharge Date: 3/14/2025; Expected Discharge Time:      VTE Prophylaxis:  Pharmacologic & mechanical VTE prophylaxis orders are present.         AM-PAC 6 Clicks Score (PT): 12 (03/11/25 1959)    CODE STATUS:   Code Status and Medical Interventions: No CPR (Do Not Attempt to Resuscitate); Limited Support; No intubation (DNI)   Ordered at: 03/10/25 1203     Code Status (Patient has no pulse and is not breathing):    No CPR (Do Not Attempt to Resuscitate)     Medical Interventions (Patient has pulse or is breathing):    Limited Support     Medical Intervention Limits:    No intubation (DNI)     Level Of Support Discussed With:    Patient       Larissa Conteh MD  03/12/25

## 2025-03-12 NOTE — PLAN OF CARE
Goal Outcome Evaluation:  Plan of Care Reviewed With: patient        Progress: improving  Outcome Evaluation: Pt alert, Ox4 and reports tolerable pain BLE. OT educated her on ADL retraining, transfer training and issued self-care kit. She completed transfer training with min assist x2, ambulkated 4 feet with min assist x2 using RW and completed LB dressing task with CGA using AE. Pt limited with dyspnea without desaturaiton on RA, decreased occupational endurance, decreased strength, limited balance and is performing below baseline. Recommend IPR if medically stable, pt in agreement.    Anticipated Discharge Disposition (OT): inpatient rehabilitation facility

## 2025-03-12 NOTE — PLAN OF CARE
Problem: Adult Inpatient Plan of Care  Goal: Plan of Care Review  Outcome: Progressing  Goal: Patient-Specific Goal (Individualized)  Outcome: Progressing  Goal: Absence of Hospital-Acquired Illness or Injury  Outcome: Progressing  Intervention: Identify and Manage Fall Risk  Recent Flowsheet Documentation  Taken 3/11/2025 1959 by Paola Tobar RN  Safety Promotion/Fall Prevention:   activity supervised   safety round/check completed   clutter free environment maintained   assistive device/personal items within reach   fall prevention program maintained  Intervention: Prevent Infection  Recent Flowsheet Documentation  Taken 3/11/2025 1959 by Paola Tobar RN  Infection Prevention:   environmental surveillance performed   equipment surfaces disinfected   hand hygiene promoted  Goal: Optimal Comfort and Wellbeing  Outcome: Progressing  Intervention: Monitor Pain and Promote Comfort  Recent Flowsheet Documentation  Taken 3/11/2025 2102 by Paola Tobar RN  Pain Management Interventions: pain medication given  Intervention: Provide Person-Centered Care  Recent Flowsheet Documentation  Taken 3/11/2025 1959 by Paola Tobar RN  Trust Relationship/Rapport:   emotional support provided   empathic listening provided   questions answered   questions encouraged   care explained  Goal: Readiness for Transition of Care  Outcome: Progressing     Problem: Skin Injury Risk Increased  Goal: Skin Health and Integrity  Outcome: Progressing  Intervention: Optimize Skin Protection  Recent Flowsheet Documentation  Taken 3/11/2025 2321 by Paola Tobar RN  Activity Management: back to bed  Taken 3/11/2025 1959 by Paola Tobar RN  Activity Management:   activity encouraged   up in chair  Head of Bed (HOB) Positioning: HOB at 20-30 degrees     Problem: Fall Injury Risk  Goal: Absence of Fall and Fall-Related Injury  Outcome: Progressing  Intervention: Promote Injury-Free Environment  Recent Flowsheet  Documentation  Taken 3/11/2025 1959 by Paola Tobar, RN  Safety Promotion/Fall Prevention:   activity supervised   safety round/check completed   clutter free environment maintained   assistive device/personal items within reach   fall prevention program maintained     Problem: Noninvasive Ventilation Acute  Goal: Effective Unassisted Ventilation and Oxygenation  Outcome: Progressing   Goal Outcome Evaluation:

## 2025-03-12 NOTE — DISCHARGE PLACEMENT REQUEST
"Romel Morales (75 y.o. Female) From Evan Lyle RN  [id number]      Date of Birth   1950    Social Security Number       Address   [address]   [place] [id number]    Home Phone   [phone]    MRN   [id number]       [employer]   [facility]    Marital Status   Single                            Admission Date   3/9/2025    Admission Type   Emergency    Admitting Provider   Larissa Conteh MD    Attending Provider   Larissa Conteh MD    Department, Room/Bed   [address], S584/1       Discharge Date       Discharge Disposition       Discharge Destination                                 Attending Provider: Larissa Conteh MD    Allergies: No Known Allergies    Isolation: None   Infection: None   Code Status: No CPR    Ht: 165.1 cm (65\")   Wt: 146 kg (321 lb 14 oz)    Admission Cmt: None   Principal Problem: Cellulitis of left lower extremity [L03.116]                   Active Insurance as of 3/9/2025       Primary Coverage       Payor Plan Insurance Group Employer/Plan Group    HUMANA MEDICARE REPLACEMENT HUMANA MEDICARE REPLACEMENT [id number]       Payor Plan Address Payor Plan Phone Number Payor Plan Fax Number Effective Dates    [address] [address] [phone]  2018 - None Entered    [facility] [id number]         Subscriber Name Subscriber Birth Date Member ID       ROMEL MORALES 1950 [id number]                     Emergency Contacts        (Rel.) Home Phone Work Phone Mobile Phone    Kurt Alberto (Son) [phone] -- [phone]    Dayna Lobo (Friend) -- -- [phone]                 History & Physical        Lisandro Godoy MD at [address] [place] [address] Medicine Services  HISTORY AND PHYSICAL    Patient Name: Romel Morales  : 1950  MRN: [id number]  Primary Care Physician: Lena Bush MD  Date of admission: 3/9/2025    Subjective  Subjective     Chief Complaint: Lower extremity pain and " swelling.    HPI:  Romel Wilson is a 75 y.o. female with past medical history of morbid obesity with BMI of 53, hypothyroidism, obstructive sleep apnea noncompliant with CPAP and chronic lower extremity lymphedema?.  Patient presented to our ED today with worsening left lower extremity pain and swelling.  Started December 24.  Getting worse.  Associated with skin discoloration.  She was seen by her PCP last month.  Bumex was doubled.  Several lower extremity testing including ultrasound were done with ankle and foot specialist and cardiology.  Reportedly where no acute DVTs.  In ED patient was hemodynamically stable.  On room air.  Labs showed mild leukocytosis with white blood count of 11.2 normal Pro-Bruno, lactate, and proBNP.  CRP 1.3.  Duplex today ordered and pending.  Chest x-ray negative.  Patient received dose of ceftriaxone and vancomycin in the ED for possible left lower extremity cellulitis.  [facility] was asked to admit the patient for further eval.        Review of Systems   Constitutional:  Positive for fatigue. Negative for fever.   Respiratory:  Negative for shortness of breath.    Cardiovascular:  Positive for leg swelling.   Gastrointestinal:  Positive for abdominal distention. Negative for anal bleeding.   Musculoskeletal:  Positive for arthralgias.          Personal History     Past Medical History:   Diagnosis Date    Arthritis     Disease of thyroid gland     Gout              Past Surgical History:   Procedure Laterality Date    HYSTERECTOMY      THYROIDECTOMY         Family History:  family history includes Hypertension in her mother.     Social History:  reports that she has been smoking cigarettes. She has never used smokeless tobacco. She reports that she does not drink alcohol and does not use drugs.  Social History     Social History Narrative    Not on file       Medications:  bumetanide, cholecalciferol, hydrOXYzine pamoate, levothyroxine, potassium chloride, rosuvastatin, vitamin B-12,  and vitamin C    No Known Allergies    Objective  Objective     Vital Signs:   Temp:  [98.8 °F (37.1 °C)] 98.8 °F (37.1 °C)  Heart Rate:  [61-75] 69  Resp:  [20] 20  BP: ()/(53-96) 129/56    Physical Exam  Vitals and nursing note reviewed.   Constitutional:       Appearance: She is obese.   HENT:      Head: Normocephalic.      Mouth/Throat:      Mouth: Mucous membranes are moist.   Cardiovascular:      Rate and Rhythm: Normal rate.   Pulmonary:      Effort: Pulmonary effort is normal.   Abdominal:      General: There is distension.      Tenderness: There is no abdominal tenderness.   Musculoskeletal:         General: Swelling and tenderness present.      Left lower leg: Edema present.   Neurological:      General: No focal deficit present.      Mental Status: She is alert.   Psychiatric:         Mood and Affect: Mood normal.            Result Review:  I have personally reviewed the results from the time of this admission to 3/9/2025 15:11 EDT and agree with these findings:  [x]  Laboratory list / accordion  []  Microbiology  [x]  Radiology  []  EKG/Telemetry   []  Cardiology/Vascular   []  Pathology  [x]  Old records  []  Other:  Most notable findings include:     LAB RESULTS:      Lab 03/09/25  1014   WBC 11.22*   HEMOGLOBIN 12.2   HEMATOCRIT 38.7   PLATELETS 285   NEUTROS ABS 7.85*   IMMATURE GRANS (ABS) 0.04   LYMPHS ABS 2.25   MONOS ABS 0.93*   EOS ABS 0.13   MCV 92.1   CRP 1.30*   PROCALCITONIN 0.04   LACTATE 1.3         Lab 03/09/25  1014   SODIUM 140   POTASSIUM 4.1   CHLORIDE 101   CO2 28.0   ANION GAP 11.0   BUN 10   CREATININE 1.05*   EGFR 55.5*   GLUCOSE 98   CALCIUM 9.0         Lab 03/09/25  1014   TOTAL PROTEIN 6.9   ALBUMIN 3.5   GLOBULIN 3.4   ALT (SGPT) 9   AST (SGOT) 17   BILIRUBIN 0.5   ALK PHOS 94         Lab 03/09/25  1254 03/09/25  1122 03/09/25  1014   PROBNP  --   --  132.9   HSTROP T 13 17* 12                 Brief Urine Lab Results       None          Microbiology Results (last 10  days)       ** No results found for the last 240 hours. **            XR Chest 1 View  Result Date: 3/9/2025  XR CHEST 1 VW Date of Exam: 3/9/2025 10:30 AM EDT Indication: cough Comparison: Low-dose chest CT 99333 23 Findings: Examination limited by body habitus. Cardiomediastinal silhouette is unremarkable. There is been a sternotomy. There is generalized interstitial prominence, similar to previous CT given differences in modality. No airspace disease, pneumothorax, nor pleural effusion. No acute osseous abnormality identified.     Impression: Impression: 1.No acute process identified Electronically Signed: Dwayne Shaw MD  3/9/2025 10:38 AM EDT  Workstation ID: [id number]          Assessment & Plan  Assessment & Plan       Cellulitis of left lower extremity    Obstructive sleep apnea, adult    Hypothyroidism (acquired)    Romel Wilson is a 75 y.o. female with past medical history of morbid obesity with BMI of 53, hypothyroidism, obstructive sleep apnea noncompliant with CPAP and chronic lower extremity lymphedema?. Patient presented to our ED today with worsening left lower extremity pain and swelling.     ## Acute on chronic lower extremity edema.  Failed p.o. diuretics  ## Chronic venous stasis with concern for left lower extremity purulent cellulitis  ## Morbid obesity complicating all aspects of care  - She was seen by her PCP last month.  Bumex was doubled with no improvement.    - Several lower extremity testing including ultrasound were done with ankle and foot specialist and cardiology.   - Reportedly where no acute DVTs.    - In ED patient was hemodynamically stable.  On room air.    - Labs showed mild leukocytosis with white blood count of 11.2 normal Pro-Bruno, lactate, and proBNP.  CRP 1.3.    - Duplex today ordered and pending. Chest x-ray negative.    - Patient received dose of ceftriaxone and vancomycin in the ED for possible left lower extremity cellulitis.    -Continue ceftriaxone 2 g IV daily  with the plan to switch to oral antibiotic on discharge.  -No previous echo in the chart.  Ordered  -Bumex 2 g IV x 1 in the ED.  Since patient failed Bumex will try Lasix 40 mg IV twice daily.  -SCDs.  Edema wear.  Leg elevation. PT/OT.  CM to facilitate discharge.  -UA without cultures and microalbumin/creatinine ratio ordered to rule out nephrotic syndrome      ## Obstructive sleep apnea.  Resume home CPAP.    ## Hypothyroidism.  Resume home levothyroxine.  TSH added to the morning labs.    DVT prophylaxis: Heparin subcu    CODE STATUS: Full code       Expected Discharge  Expected Discharge Date: 3/11/2025; Expected Discharge Time:       This note has been completed as part of a split-shared workflow.     Signature: Electronically signed by Lisandro Godoy MD, 25, 3:16 PM EDT               Electronically signed by Lisandro Godoy MD at 25 1802       Operative/Procedure Notes (most recent note)    No notes of this type exist for this encounter.          Physician Progress Notes (most recent note)        Larissa Conteh MD at 25 0764              [facility] Medicine Services  PROGRESS NOTE    Patient Name: Romel Wilson  : 1950  MRN: [id number]    Date of Admission: 3/9/2025  Primary Care Physician: Hafsa Plummer MD    Subjective   Subjective     CC:  Cellulitis    HPI:  Swelling has gone down some.  Left lower extremity pain has improved some.  No chest pain or shortness of breath.  No nausea or vomiting.      Objective   Objective     Vital Signs:   Temp:  [98 °F (36.7 °C)-98.2 °F (36.8 °C)] 98.2 °F (36.8 °C)  Heart Rate:  [62-78] 65  Resp:  [16-18] 16  BP: ()/(55-90) 117/90     Physical Exam:  Constitutional: No acute distress, awake, alert, obese  HENT: NCAT, mucous membranes moist  Respiratory: Clear to auscultation bilaterally, respiratory effort normal   Cardiovascular: RRR  Gastrointestinal: Positive bowel sounds, soft, nontender,  nondistended  Musculoskeletal: bilateral ankle edema, worse on the left; legs now wrapped and improved from prior  Psychiatric: Appropriate affect, cooperative  Neurologic: Alert, oriented, symmetric facies, moves all extremities, speech clear  Skin: Left lower extremity anterior shin erythematous with skin breakdown improved compared to yesterday, warm to touch and mildly tender to palpation    Results Reviewed:  LAB RESULTS:      Lab 03/11/25  0545 03/10/25  0430 03/09/25  1535 03/09/25  1254 03/09/25  1122 03/09/25  1014   WBC 9.72 11.25*  --   --   --  11.22*   HEMOGLOBIN 12.6 12.1  --   --   --  12.2   HEMATOCRIT 39.8 39.0  --   --   --  38.7   PLATELETS 275 273  --   --   --  285   NEUTROS ABS  --  7.74*  --   --   --  7.85*   IMMATURE GRANS (ABS)  --  0.05  --   --   --  0.04   LYMPHS ABS  --  2.42  --   --   --  2.25   MONOS ABS  --  0.87  --   --   --  0.93*   EOS ABS  --  0.14  --   --   --  0.13   MCV 93.2 93.5  --   --   --  92.1   CRP  --   --   --   --   --  1.30*   PROCALCITONIN  --   --   --   --   --  0.04   LACTATE  --   --  1.9  --   --  1.3   HSTROP T  --   --   --  13 17* 12         Lab 03/11/25  0545 03/10/25  1447 03/10/25  0430 03/09/25  1014   SODIUM 140  --  142 140   POTASSIUM 3.9 4.2 3.6 4.1   CHLORIDE 96*  --  100 101   CO2 31.0*  --  30.0* 28.0   ANION GAP 13.0  --  12.0 11.0   BUN 12  --  10 10   CREATININE 1.16*  --  1.09* 1.05*   EGFR 49.3*  --  53.1* 55.5*   GLUCOSE 101*  --  93 98   CALCIUM 9.2  --  8.7 9.0   MAGNESIUM  --   --  1.9  --    TSH  --   --  4.510*  --          Lab 03/10/25  0430 03/09/25  1014   TOTAL PROTEIN 6.2 6.9   ALBUMIN 3.5 3.5   GLOBULIN 2.7 3.4   ALT (SGPT) 9 9   AST (SGOT) 16 17   BILIRUBIN 0.4 0.5   ALK PHOS 96 94         Lab 03/09/25  1254 03/09/25  1122 03/09/25  1014   PROBNP  --   --  132.9   HSTROP T 13 17* 12                 Brief Urine Lab Results  (Last result in the past 365 days)        Color   Clarity   Blood   Leuk Est   Nitrite   Protein   CREAT    Urine HCG        03/10/25 0051             16.6         03/10/25 0051 Yellow   Clear   Negative   Negative   Negative   Negative                   Microbiology Results Abnormal       None            Duplex Venous Lower Extremity - Left  Result Date: 3/9/2025    No evidence of deep or superficial venous thrombus in the left lower extremity.  The calf veins were difficult to visualize but no apparent thrombus.     XR Chest 1 View  Result Date: 3/9/2025  XR CHEST 1 VW Date of Exam: 3/9/2025 10:30 AM EDT Indication: cough Comparison: Low-dose chest CT [address] 23 Findings: Examination limited by body habitus. Cardiomediastinal silhouette is unremarkable. There is been a sternotomy. There is generalized interstitial prominence, similar to previous CT given differences in modality. No airspace disease, pneumothorax, nor pleural effusion. No acute osseous abnormality identified.     Impression: Impression: 1.No acute process identified Electronically Signed: Dwayne Shaw MD  3/9/2025 10:38 AM EDT  Workstation ID: [id number]      Results for orders placed during the hospital encounter of 03/09/25    Adult Transthoracic Echo Complete W/ Cont if Necessary Per Protocol    Interpretation Summary    Very poor acoustic windows.  All cardiac structures poorly visualized.    Left ventricular systolic function is normal. Calculated left ventricular EF = 66.7% Left ventricular ejection fraction appears to be 66 - 70%.    The right ventricular cavity is mildly dilated with normal systolic function by visual estimation.    Mild aortic regurgitation is present.      Current medications:  Scheduled Meds:cefTRIAXone, 2,000 mg, Intravenous, Daily  enoxaparin sodium, 60 mg, Subcutaneous, Q12H  furosemide, 40 mg, Intravenous, Q12H  guaifenesin-dextromethorphan 600-30 mg, 1 tablet, Oral, Q12H  levothyroxine, 125 mcg, Oral, Q AM  rosuvastatin, 20 mg, Oral, Nightly  sodium chloride, 10 mL, Intravenous, Q12H      Continuous Infusions:   PRN Meds:.   acetaminophen **OR** acetaminophen **OR** acetaminophen    senna-docusate sodium **AND** polyethylene glycol **AND** bisacodyl **AND** bisacodyl    Calcium Replacement - Follow Nurse / BPA Driven Protocol    Magnesium Low Dose Replacement - Follow Nurse / BPA Driven Protocol    ondansetron ODT **OR** ondansetron    Phosphorus Replacement - Follow Nurse / BPA Driven Protocol    Potassium Replacement - Follow Nurse / BPA Driven Protocol    [COMPLETED] Insert Peripheral IV **AND** sodium chloride    sodium chloride    sodium chloride    Assessment & Plan   Assessment & Plan     Active Hospital Problems    Diagnosis  POA    **Cellulitis of left lower extremity [L03.116]  Yes    Hypothyroidism (acquired) [E03.9]  Unknown    Obstructive sleep apnea, adult [G47.33]  Yes      Resolved Hospital Problems   No resolved problems to display.        Brief Hospital Course to date:  Romel Wilson is a 75 y.o. female with history of morbid obesity (BMI 53), NANCY noncompliant with CPAP, hypothyroidism, who presented for evaluation of worsening left lower extremity pain and swelling.  Patient reports worsening of lower extremity edema since December.  PCP has been increasing Bumex dosing outpatient without improvement in symptoms.  Venous duplex negative for VTE on admission.    Acute on chronic bilateral lower extremity edema, worse on the left  Chronic venous stasis changes left lower extremity with superimposed left lower extremity cellulitis  -Echo w LVEF 66 to 70%, did not comment on diastolic function  -S/p IV diuresis; resumed home PO bumex  -PT wound for compression wraps  -Continue ceftriaxone; no evidence of purulence, stopped vancomycin    Deconditioning  -PT/OT evaluation --> rehab, patient amenable    NANCY-CPAP  Hypothyroidism-continue Synthroid  HLD-continue Crestor    Expected Discharge Location and Transportation: Rehab  Expected Discharge   Expected Discharge Date: 3/14/2025; Expected Discharge Time:      VTE  Prophylaxis:  Pharmacologic & mechanical VTE prophylaxis orders are present.         AM-PAC 6 Clicks Score (PT): 12 (03/10/25 1944)    CODE STATUS:   Code Status and Medical Interventions: No CPR (Do Not Attempt to Resuscitate); Limited Support; No intubation (DNI)   Ordered at: 03/10/25 1203     Code Status (Patient has no pulse and is not breathing):    No CPR (Do Not Attempt to Resuscitate)     Medical Interventions (Patient has pulse or is breathing):    Limited Support     Medical Intervention Limits:    No intubation (DNI)     Level Of Support Discussed With:    Patient       Larissa Conteh MD  25        Electronically signed by Larissa Conteh MD at 25 1133          Consult Notes (most recent note)        Padmini Vera at 03/10/25 1400        Consult Orders    1. Inpatient Spiritual Care Consult [[id number]] ordered by Lisandro Godoy MD at 25 1702              Summary:Advance Care Planning Visit                 I visited this patient per her request to assist with advance care planning. The patient's living will is complete, notarized, and filed appropriately. I provided the original plus two copies to the patient's sister at the bedside. I do not have plans to continue following but am happy to return if requested or otherwise needed. Please re-consult in that case.     Electronically signed by Padmini Vera at 03/10/25 1437          Physical Therapy Notes (most recent note)        Onesimo Torres, PT at 25 0916  Version 1 of 1         Acute Care - Wound/Debridement Initial Evaluation  [facility]     Patient Name: Romel Wilson  : 1950  MRN: [id number]  Today's Date: 3/11/2025                Admit Date: 3/9/2025    Visit Dx:    ICD-10-CM ICD-9-CM   1. Pain of left lower extremity  M79.605 729.5   2. Cellulitis of left lower extremity  L03.116 682.6       Patient Active Problem List   Diagnosis    Obstructive sleep apnea, adult    Morbid obesity due to excess calories     Cellulitis of left lower extremity    Hypothyroidism (acquired)        Past Medical History:   Diagnosis Date    Arthritis     Disease of thyroid gland     Gout         Past Surgical History:   Procedure Laterality Date    HYSTERECTOMY      THYROIDECTOMY                 Lymphedema       Row Name 03/11/25 0800             Lymphedema Edema Assessment    Ptting Edema Category By severity  -      Pitting Edema Severe  -MF         Skin Changes/Observations    Location/Assessment Lower Extremity  -MF      Lower Extremity Conditions bilateral:;scaly;crust  -MF      Lower Extremity Color/Pigment bilateral:;hyperpigmented  -MF         Lymphedema Pulses/Capillary Refill    Lymphedema Pulses/Capillary Refill lower extremity pulses;capillary refill  -      Dorsalis Pedis Pulse right:;left:;+1 diminished  -MF      Capillary Refill lower extremity capillary refill  -MF      Lower Extremity Capillary Refill left:;right:;less than 3 seconds  -MF         Lymphedema Measurements    Measurement Type(s) Quick Girth  -MF      Quick Girth Areas Lower extremities  -MF         LLE Quick Girth (cm)    Mid foot 29 cm  -MF      Smallest ankle 31 cm  -MF      Largest calf 52 cm  -MF         RLE Quick Girth (cm)    Mid foot 27 cm  -MF      Smallest ankle 28 cm  -MF      Largest calf 48 cm  -MF      RLE Quick Girth Total 103  -MF         Compression/Skin Care    Compression/Skin Care skin care;wrapping location;bandaging  -MF      Skin Care washed/dried;lotion applied;moisturizing lotion applied  -MF      Wrapping Location lower extremity  -MF      Wrapping Location LE bilateral:;foot to knee  -MF      Wrapping Comments size 5/6 compressogrip doubled and overlapping for gradient compression.  -                User Key  (r) = Recorded By, (t) = Taken By, (c) = Cosigned By      Initials Name Provider Type    Onesimo Coleman, PT Physical Therapist                    WOUND DEBRIDEMENT                     PT Assessment (Last 12 Hours)        PT Evaluation and Treatment       Row Name 03/11/25 0800          Physical Therapy Time and Intention    Subjective Information complains of;weakness;pain;fatigue  -     Document Type evaluation;therapy note (daily note);wound care  -     Mode of Treatment individual therapy;physical therapy  -       Row Name 03/11/25 0800          General Information    Patient Profile Reviewed yes  -     Patient Observations cooperative;alert;agree to therapy  -     Pertinent History of Current Functional Problem BLE edema  -     Risks Reviewed patient:;increased discomfort  -     Benefits Reviewed patient:;improve skin integrity  -     Barriers to Rehab medically complex;previous functional deficit;physical barrier  -       Row Name 03/11/25 0800          Pain    Pain Location extremity  -     Pain Side/Orientation left;lower  -     Additional Documentation Pain Scale: FACES Pre/Post-Treatment (Group)  -       Row Name 03/11/25 0800          Pain Scale: FACES Pre/Post-Treatment    Pain: FACES Scale, Pretreatment 6-->hurts even more  -     Posttreatment Pain Rating 6-->hurts even more  -       Row Name 03/11/25 0800          Cognition    Affect/Mental Status (Cognition) WFL  -       Row Name 03/11/25 0800          Coping    Observed Emotional State calm;cooperative  -     Verbalized Emotional State acceptance  -     Trust Relationship/Rapport care explained  -       Row Name 03/11/25 0800          Plan of Care Review    Plan of Care Reviewed With patient  -     Outcome Evaluation Pt Presents with BLE edema increased from baseline with moderate pain in LLE / ankle. PT applied light compression to help increase venous return to improve skin integrity and healing potential. PT will f/u with compression wrapping management in 2-3 days.  -       Row Name 03/11/25 0800          Positioning and Restraints    Pre-Treatment Position sitting in chair/recliner  -     Post Treatment Position  chair  -MF     In Chair reclined;call light within reach  -       Row Name 03/11/25 0800          Therapy Assessment/Plan (PT)    Patient/Family Therapy Goals Statement (PT) decrease LE edema  -     PT Diagnosis (PT) BLE edema  -     Rehab Potential (PT) fair  -     Criteria for Skilled Interventions Met (PT) yes;meets criteria;skilled treatment is necessary  -     Predicted Duration of Therapy Intervention (PT) 10 days  -       Row Name 03/11/25 0800          Therapy Plan Review/Discharge Plan (PT)    Therapy Plan Review (PT) evaluation/treatment results reviewed;risks/benefits reviewed;care plan/treatment goals reviewed;current/potential barriers reviewed;participants voiced agreement with care plan;participants included;patient  -       Row Name 03/11/25 0800          Physical Therapy Goals    Wound Management Goal Selection (PT) wound management, PT goal 1;wound management, PT goal 2  -       Row Name 03/11/25 0800          Wound Management Goal 1 (PT)    Wound Management Goal (Wound Goal 1, PT) Decrease BLE edema minimal to improve skin integrity and mobility  -     Time Frame (Wound Goal 1, PT) 1 week  -       Row Name 03/11/25 0800          Wound Management Goal 2 (PT)    Wound Management Goal (Wound Goal 2, PT) Decrease BLE edema none to improve skin integrity and mobility  -     Time Frame (Wound Goal 2, PT) 2 weeks  -               User Key  (r) = Recorded By, (t) = Taken By, (c) = Cosigned By      Initials Name Provider Type    Onesimo Coleman, PT Physical Therapist                      Recommendation and Plan  Anticipated Discharge Disposition (PT): inpatient rehabilitation facility  Planned Therapy Interventions (PT): wound care  Therapy Frequency (PT): daily  Plan of Care Reviewed With: patient           Outcome Evaluation: Pt Presents with BLE edema increased from baseline with moderate pain in LLE / ankle. PT applied light compression to help increase venous return to  improve skin integrity and healing potential. PT will f/u with compression wrapping management in 2-3 days.  Plan of Care Reviewed With: patient            Time Calculation   PT Charges       Row Name 25 0800             Time Calculation    Start Time 0800  -MF      PT Goal Re-Cert Due Date 25  -MF         Untimed Charges    PT Eval/Re-eval Minutes 35  -MF      Wound Care 08517 Multilayer comp below knee  -MF      [id number] comp below knee 25  -MF         Total Minutes    Untimed Charges Total Minutes 60  -MF       Total Minutes 60  -MF                User Key  (r) = Recorded By, (t) = Taken By, (c) = Cosigned By      Initials Name Provider Type     Onesimo Torres, PT Physical Therapist                            PT G-Codes  Outcome Measure Options: AM-PAC 6 Clicks Daily Activity (OT)  AM-PAC 6 Clicks Score (PT): 12  AM-PAC 6 Clicks Score (OT): 12       Onesimo Torres, ANNEMARIE  3/11/2025      Electronically signed by Onesimo Torres, PT at 25 0917          Occupational Therapy Notes (most recent note)        Bharti Todd, OT at 03/10/25 0942          Patient Name: Romel Wilson  : 1950    MRN: [id number]                              Today's Date: 3/10/2025       Admit Date: 3/9/2025    Visit Dx:     ICD-10-CM ICD-9-CM   1. Pain of left lower extremity  M79.605 729.5   2. Cellulitis of left lower extremity  L03.116 682.6     Patient Active Problem List   Diagnosis    Obstructive sleep apnea, adult    Morbid obesity due to excess calories    Cellulitis of left lower extremity    Hypothyroidism (acquired)     Past Medical History:   Diagnosis Date    Arthritis     Disease of thyroid gland     Gout      Past Surgical History:   Procedure Laterality Date    HYSTERECTOMY      THYROIDECTOMY        General Information       Row Name 03/10/25 1012          OT Time and Intention    Document Type evaluation  -MC     Mode of Treatment occupational therapy  -MC       Row Name  03/10/25 1012          General Information    Patient Profile Reviewed yes  -     Prior Level of Function independent:;bed mobility;transfer;all household mobility;ADL's  Pt uses rollator for mobility, walks HH distances  -     Existing Precautions/Restrictions fall;other (see comments)  LLE pain  -     Barriers to Rehab medically complex  -       Row Name 03/10/25 1012          Living Environment    Current Living Arrangements apartment  -     People in Home alone  -       Row Name 03/10/25 1012          Home Main Entrance    Number of Stairs, Main Entrance none  -       Row Name 03/10/25 1012          Stairs Within Home, Primary    Number of Stairs, Within Home, Primary none  -       Row Name 03/10/25 1012          Cognition    Orientation Status (Cognition) oriented x 3  -       Row Name 03/10/25 1012          Safety Issues/Impairments Affecting Functional Mobility    Safety Issues Affecting Function (Mobility) awareness of need for assistance;insight into deficits/self-awareness;safety precaution awareness;safety precautions follow-through/compliance;sequencing abilities  -     Impairments Affecting Function (Mobility) balance;endurance/activity tolerance;pain;strength;shortness of breath  -               User Key  (r) = Recorded By, (t) = Taken By, (c) = Cosigned By      Initials Name Provider Type     Bharti Todd OT Occupational Therapist                     Mobility/ADL's       Row Name 03/10/25 1013          Bed Mobility    Bed Mobility supine-sit  -     Supine-Sit [place] (Bed Mobility) minimum assist (75% patient effort);2 person assist;verbal cues  -     Bed Mobility, Safety Issues decreased use of arms for pushing/pulling;decreased use of legs for bridging/pushing  -     Assistive Device (Bed Mobility) bed rails;head of bed elevated;repositioning sheet  -       Row Name 03/10/25 1013          Transfers    Transfers sit-stand transfer;stand-sit transfer  -        Row Name 03/10/25 1013          Sit-Stand Transfer    Sit-Stand [place] (Transfers) moderate assist (50% patient effort);2 person assist;verbal cues  -     Assistive Device (Sit-Stand Transfers) walker, front-wheeled  -       Row Name 03/10/25 1013          Stand-Sit Transfer    Stand-Sit [place] (Transfers) moderate assist (50% patient effort);2 person assist;verbal cues  -     Assistive Device (Stand-Sit Transfers) other (see comments)  -       Row Name 03/10/25 1013          Activities of Daily Living    BADL Assessment/Intervention lower body dressing;upper body dressing;bathing  -[facility] Name 03/10/25 1013          Lower Body Dressing Assessment/Training    [place] Level (Lower Body Dressing) don;socks;dependent (less than 25% patient effort);verbal cues  -     Position (Lower Body Dressing) supine  -[facility] Name 03/10/25 1013          Upper Body Dressing Assessment/Training    [place] Level (Upper Body Dressing) don;other (see comments);minimum assist (75% patient effort)  -     Position (Upper Body Dressing) sitting up in bed  -     Comment, (Upper Body Dressing) hosp gown  -[facility] Name 03/10/25 1013          Bathing Assessment/Intervention    [place] Level (Bathing) dependent (less than 25% patient effort);verbal cues  -     Position (Bathing) sitting up in bed;supported standing  -     Comment, (Bathing) Pt's son bathing pt upon entry, pt's son washed pt's buttock upon standing.  -               User Key  (r) = Recorded By, (t) = Taken By, (c) = Cosigned By      Initials Name Provider Type     Bharti Todd OT Occupational Therapist                   Obj/Interventions       Row Name 03/10/25 1014          Sensory Assessment (Somatosensory)    Sensory Assessment (Somatosensory) UE sensation intact  -[facility] Name 03/10/25 1014          Vision Assessment/Intervention    Visual Impairment/Limitations WFL  -[facility] Name 03/10/25  1014          Range of Motion Comprehensive    General Range of Motion bilateral upper extremity ROM WFL  -       Row Name 03/10/25 1014          Strength Comprehensive (MMT)    General Manual Muscle Testing (MMT) Assessment upper extremity strength deficits identified  -     Comment, General Manual Muscle Testing (MMT) Assessment BUE grossly 4/5  -       Row Name 03/10/25 1014          Balance    Balance Assessment sitting static balance;sitting dynamic balance;sit to stand dynamic balance;standing static balance  -     Static Sitting Balance contact guard  -     Dynamic Sitting Balance minimal assist  -     Position, Sitting Balance unsupported;sitting edge of bed  -     Sit to Stand Dynamic Balance moderate assist;2-person assist;verbal cues  -     Static Standing Balance moderate assist;2-person assist;verbal cues  -     Position/Device Used, Standing Balance supported;walker, front-wheeled  -     Balance Interventions sitting;sit to stand;occupation based/functional task  -               User Key  (r) = Recorded By, (t) = Taken By, (c) = Cosigned By      Initials Name Provider Type     Bharti Todd OT Occupational Therapist                   Goals/Plan       [facility] Name 03/10/25 1016          Bed Mobility Goal 1 (OT)    Activity/Assistive Device (Bed Mobility Goal 1, OT) sit to supine;supine to sit  -     [place] Level/Cues Needed (Bed Mobility Goal 1, OT) standby assist  -     Time Frame (Bed Mobility Goal 1, OT) long term goal (LTG);10 days  -     Progress/Outcomes (Bed Mobility Goal 1, OT) goal ongoing  -[facility] Name 03/10/25 1016          Transfer Goal 1 (OT)    Activity/Assistive Device (Transfer Goal 1, OT) sit-to-stand/stand-to-sit;bed-to-chair/chair-to-bed;toilet;walker, rolling  -     [place] Level/Cues Needed (Transfer Goal 1, OT) contact guard required  -     Time Frame (Transfer Goal 1, OT) long term goal (LTG);10 days  -     Progress/Outcome  (Transfer Goal 1, OT) goal ongoing  -       Row Name 03/10/25 1016          Dressing Goal 1 (OT)    Activity/Device (Dressing Goal 1, OT) lower body dressing  don/doff socks w/ AAD  -     [place]/Cues Needed (Dressing Goal 1, OT) moderate assist (50-74% patient effort)  -     Time Frame (Dressing Goal 1, OT) long term goal (LTG);10 days  -     Progress/Outcome (Dressing Goal 1, OT) goal ongoing  -       Row Name 03/10/25 1016          Toileting Goal 1 (OT)    Activity/Device (Toileting Goal 1, OT) adjust/manage clothing;perform perineal hygiene;commode;grab bar/safety frame  -     [place] Level/Cues Needed (Toileting Goal 1, OT) minimum assist (75% or more patient effort)  -     Time Frame (Toileting Goal 1, OT) short term goal (STG);5 days  -     Progress/Outcome (Toileting Goal 1, OT) goal ongoing  -       Row Name 03/10/25 1016          Therapy Assessment/Plan (OT)    Planned Therapy Interventions (OT) activity tolerance training;adaptive equipment training;BADL retraining;edema control/reduction;functional balance retraining;IADL retraining;occupation/activity based interventions;patient/caregiver education/training;ROM/therapeutic exercise;strengthening exercise;transfer/mobility retraining  -               User Key  (r) = Recorded By, (t) = Taken By, (c) = Cosigned By      Initials Name Provider Type     Bharti Todd, OT Occupational Therapist                   Clinical Impression       Row Name 03/10/25 1015          Pain Assessment    Pretreatment Pain Rating 9/10  -     Posttreatment Pain Rating 9/10  -     Pain Location extremity  -     Pain Side/Orientation left;lower  -     Pain Management Interventions exercise or physical activity utilized;positioning techniques utilized  -     Response to Pain Interventions activity participation with tolerable pain  -       Row Name 03/10/25 1015          Plan of Care Review    Plan of Care Reviewed With patient;son   -     Outcome Evaluation Pt's ADL independence limited d/t increased pain, generalized weakness, decreased functional endurance, and balance deficits. Pt would benefit from continued skilled IPOT services to address current functional deficits. Rec IRF at d/c for best functional outcomes.  -       Row Name 03/10/25 1015          Therapy Assessment/Plan (OT)    Patient/Family Therapy Goal Statement (OT) Return to PLOF  -     Rehab Potential (OT) good  -     Criteria for Skilled Therapeutic Interventions Met (OT) yes;skilled treatment is necessary  -     Therapy Frequency (OT) daily  -     Predicted Duration of Therapy Intervention (OT) 10 days  -       Row Name 03/10/25 1015          Therapy Plan Review/Discharge Plan (OT)    Anticipated Discharge Disposition (OT) inpatient rehabilitation facility  -       Row Name 03/10/25 1015          Vital Signs    O2 Delivery Pre Treatment room air  -     O2 Delivery Intra Treatment room air  -     O2 Delivery Post Treatment room air  -     Pre Patient Position Supine  -     Intra Patient Position Standing  -     Post Patient Position Sitting  -       Row Name 03/10/25 1015          Positioning and Restraints    Pre-Treatment Position in bed  -     Post Treatment Position bed  -     In Bed sitting EOB;with PT  -               User Key  (r) = Recorded By, (t) = Taken By, (c) = Cosigned By      Initials Name Provider Type    Bharti Morgan, LEVI Occupational Therapist                   Outcome Measures       Row Name 03/10/25 1017          How much help from another is currently needed...    Putting on and taking off regular lower body clothing? 1  -MC     Bathing (including washing, rinsing, and drying) 1  -MC     Toileting (which includes using toilet bed pan or urinal) 2  -MC     Putting on and taking off regular upper body clothing 2  -MC     Taking care of personal grooming (such as brushing teeth) 3  -MC     Eating meals 3  -MC      -[facility] 6 Clicks Score (OT) 12  -       Row Name 03/10/25 1017          Functional Assessment    Outcome Measure Options -[facility] 6 Clicks Daily Activity (OT)  -               User Key  (r) = Recorded By, (t) = Taken By, (c) = Cosigned By      Initials Name Provider Type     Bharti Todd OT Occupational Therapist                    Occupational Therapy Education       Title: PT OT SLP Therapies (Done)       Topic: Occupational Therapy (Done)       Point: ADL training (Done)       Description:   Instruct learner(s) on proper safety adaptation and remediation techniques during self care or transfers.   Instruct in proper use of assistive devices.                  Learning Progress Summary            Patient Acceptance, E, VU by  at 3/10/2025 1018                      Point: Precautions (Done)       Description:   Instruct learner(s) on prescribed precautions during self-care and functional transfers.                  Learning Progress Summary            Patient Acceptance, E, VU by  at 3/10/2025 1018                      Point: Body mechanics (Done)       Description:   Instruct learner(s) on proper positioning and spine alignment during self-care, functional mobility activities and/or exercises.                  Learning Progress Summary            Patient Acceptance, E, VU by  at 3/10/2025 1018                                      User Key       Initials Effective Dates Name Provider Type Discipline     10/14/22 -  Bharti Todd OT Occupational Therapist OT                  OT Recommendation and Plan  Planned Therapy Interventions (OT): activity tolerance training, adaptive equipment training, BADL retraining, edema control/reduction, functional balance retraining, IADL retraining, occupation/activity based interventions, patient/caregiver education/training, ROM/therapeutic exercise, strengthening exercise, transfer/mobility retraining  Therapy Frequency (OT): daily  Plan of Care  Review  Plan of Care Reviewed With: patient, son  Outcome Evaluation: Pt's ADL independence limited d/t increased pain, generalized weakness, decreased functional endurance, and balance deficits. Pt would benefit from continued skilled IPOT services to address current functional deficits. Rec IRF at d/c for best functional outcomes.     Time Calculation:   Evaluation Complexity (OT)  Review Occupational Profile/Medical/Therapy History Complexity: expanded/moderate complexity  Assessment, Occupational Performance/Identification of Deficit Complexity: 3-5 performance deficits  Clinical Decision Making Complexity (OT): detailed assessment/moderate complexity  Overall Complexity of Evaluation (OT): moderate complexity     Time Calculation- OT       Row Name 03/10/25 1021             Time Calculation- OT    OT Start Time 0942  -MC      OT Received On 03/10/25  -      OT Goal Re-Cert Due Date 03/20/25  -         Timed Charges    02735 - OT Therapeutic Activity Minutes 3  -MC      12684 - OT Self Care/Mgmt Minutes 5  -MC         Untimed Charges    OT Eval/Re-eval Minutes 31  -MC         Total Minutes    Timed Charges Total Minutes 8  -MC      Untimed Charges Total Minutes 31  -MC       Total Minutes 39  -MC                User Key  (r) = Recorded By, (t) = Taken By, (c) = Cosigned By      Initials Name Provider Type     Bharti Todd OT Occupational Therapist                  Therapy Charges for Today       Code Description Service Date Service Provider Modifiers Qty    01365179029  OT SELF CARE/MGMT/TRAIN EA 15 MIN 3/10/2025 Bharti Todd OT GO 1    [id number]  OT EVAL MOD COMPLEXITY 3 3/10/2025 Bharti Todd OT GO 1                 Bharti Todd OT  3/10/2025    Electronically signed by Bharti Todd OT at 03/10/25 1022       Speech Language Pathology Notes (most recent note)    No notes exist for this encounter.

## 2025-03-13 PROCEDURE — 63710000001 REVEFENACIN 175 MCG/3ML SOLUTION: Performed by: STUDENT IN AN ORGANIZED HEALTH CARE EDUCATION/TRAINING PROGRAM

## 2025-03-13 PROCEDURE — 94799 UNLISTED PULMONARY SVC/PX: CPT

## 2025-03-13 PROCEDURE — 99232 SBSQ HOSP IP/OBS MODERATE 35: CPT | Performed by: INTERNAL MEDICINE

## 2025-03-13 PROCEDURE — 25010000002 CEFTRIAXONE PER 250 MG: Performed by: HOSPITALIST

## 2025-03-13 PROCEDURE — 94664 DEMO&/EVAL PT USE INHALER: CPT

## 2025-03-13 PROCEDURE — 29581 APPL MULTLAYER CMPRN SYS LEG: CPT

## 2025-03-13 PROCEDURE — 97530 THERAPEUTIC ACTIVITIES: CPT

## 2025-03-13 PROCEDURE — 25010000002 ENOXAPARIN PER 10 MG: Performed by: HOSPITALIST

## 2025-03-13 RX ORDER — LEVOCETIRIZINE DIHYDROCHLORIDE 5 MG/1
5 TABLET, FILM COATED ORAL EVERY EVENING
COMMUNITY

## 2025-03-13 RX ORDER — TIOTROPIUM BROMIDE INHALATION SPRAY 3.12 UG/1
2 SPRAY, METERED RESPIRATORY (INHALATION)
COMMUNITY

## 2025-03-13 RX ORDER — MAGNESIUM OXIDE 400 MG/1
400 TABLET ORAL DAILY
COMMUNITY

## 2025-03-13 RX ORDER — ALBUTEROL SULFATE 90 UG/1
2 INHALANT RESPIRATORY (INHALATION) EVERY 4 HOURS PRN
COMMUNITY

## 2025-03-13 RX ORDER — ALLOPURINOL 100 MG/1
200 TABLET ORAL DAILY
COMMUNITY

## 2025-03-13 RX ADMIN — ROSUVASTATIN 20 MG: 20 TABLET, FILM COATED ORAL at 20:56

## 2025-03-13 RX ADMIN — REVEFENACIN 175 MCG: 175 SOLUTION RESPIRATORY (INHALATION) at 09:41

## 2025-03-13 RX ADMIN — Medication 10 ML: at 09:28

## 2025-03-13 RX ADMIN — GUAIFENESIN AND DEXTROMETHORPHAN HYDROBROMIDE 1 TABLET: 600; 30 TABLET, EXTENDED RELEASE ORAL at 09:21

## 2025-03-13 RX ADMIN — ENOXAPARIN SODIUM 60 MG: 60 INJECTION SUBCUTANEOUS at 05:55

## 2025-03-13 RX ADMIN — ENOXAPARIN SODIUM 60 MG: 60 INJECTION SUBCUTANEOUS at 18:36

## 2025-03-13 RX ADMIN — CEFTRIAXONE SODIUM 2000 MG: 2 INJECTION, POWDER, FOR SOLUTION INTRAMUSCULAR; INTRAVENOUS at 09:22

## 2025-03-13 RX ADMIN — ALLOPURINOL 200 MG: 100 TABLET ORAL at 09:21

## 2025-03-13 RX ADMIN — Medication 1000 UNITS: at 09:22

## 2025-03-13 RX ADMIN — GUAIFENESIN AND DEXTROMETHORPHAN HYDROBROMIDE 1 TABLET: 600; 30 TABLET, EXTENDED RELEASE ORAL at 20:56

## 2025-03-13 RX ADMIN — BUMETANIDE 1 MG: 1 TABLET ORAL at 09:21

## 2025-03-13 RX ADMIN — LEVOTHYROXINE SODIUM 100 MCG: 0.1 TABLET ORAL at 05:55

## 2025-03-13 NOTE — PLAN OF CARE
Goal Outcome Evaluation:  Plan of Care Reviewed With: patient           Outcome Evaluation: Pt cont to have mild BLE edema with dry flaking skin to LLE. PT reapplied MLW to help further reduce LE edema and improve skin integrity and mobiltiy.

## 2025-03-13 NOTE — THERAPY TREATMENT NOTE
Patient Name: Romel Wilson  : 1950    MRN: [id number]                              Today's Date: 3/13/2025       Admit Date: 3/9/2025    Visit Dx:     ICD-10-CM ICD-9-CM   1. Pain of left lower extremity  M79.605 729.5   2. Cellulitis of left lower extremity  L03.116 682.6     Patient Active Problem List   Diagnosis    Obstructive sleep apnea, adult    Morbid obesity due to excess calories    Cellulitis of left lower extremity    Hypothyroidism (acquired)     Past Medical History:   Diagnosis Date    Arthritis     Disease of thyroid gland     Gout      Past Surgical History:   Procedure Laterality Date    HYSTERECTOMY      THYROIDECTOMY        General Information       Row Name 25 1003          Physical Therapy Time and Intention    Document Type therapy note (daily note)  -AE     Mode of Treatment physical therapy  -AE       Row Name 25 1003          General Information    Patient Profile Reviewed yes  -AE     Existing Precautions/Restrictions fall  -AE     Barriers to Rehab medically complex;previous functional deficit;physical barrier  -AE       Row Name 25 1003          Cognition    Orientation Status (Cognition) oriented x 4  -AE       Row Name 25 1003          Safety Issues/Impairments Affecting Functional Mobility    Safety Issues Affecting Function (Mobility) awareness of need for assistance;insight into deficits/self-awareness;safety precaution awareness;sequencing abilities  -AE     Impairments Affecting Function (Mobility) balance;endurance/activity tolerance;pain;strength;shortness of breath;range of motion (ROM)  -AE               User Key  (r) = Recorded By, (t) = Taken By, (c) = Cosigned By      Initials Name Provider Type    AE Calderon Beard PT Physical Therapist                   Mobility       Row Name 25 1051          Bed Mobility    Comment, (Bed Mobility) Pt received and left [facility].  -AE       Row Name 25 1051          Transfers    Comment,  (Transfers) VCs for hand placement and sequencing. Pt able to push up from arm rests of chair prior to grabbing walker.  -AE       Row Name 03/13/25 1059          Sit-Stand Transfer    Sit-Stand [place] (Transfers) contact guard;verbal cues  -AE     Assistive Device (Sit-Stand Transfers) walker, front-wheeled  -AE       Row Name 03/13/25 1059          Gait/Stairs (Locomotion)    [place] Level (Gait) minimum assist (75% patient effort);1 person assist;verbal cues  -AE     Distance in Feet (Gait) 4  +5ft  -AE     Deviations/Abnormal Patterns (Gait) bilateral deviations;lisa decreased;gait speed decreased;stride length decreased;base of support, wide  -AE     Bilateral Gait Deviations forward flexed posture;heel strike decreased  -AE     Comment, (Gait/Stairs) Pt demo step through gait pattern with slowed lisa and decreased gait speed. Pt occasionally leaning over RW rather than standing fully upright. Pt reports using rollator at baseline which causes her to lean forward. Pt required one seated rest break d/t fatigue. Demo improved mobility with second gait bout. Further distance limited by fatigue.  -AE               User Key  (r) = Recorded By, (t) = Taken By, (c) = Cosigned By      Initials Name Provider Type    AE Calderon Beard PT Physical Therapist                   Obj/Interventions       Row Name 03/13/25 1103          Balance    Balance Assessment sitting static balance;sitting dynamic balance;standing static balance;standing dynamic balance  -AE     Static Sitting Balance standby assist  -AE     Dynamic Sitting Balance supervision  -AE     Position, Sitting Balance unsupported;sitting in chair  -AE     Static Standing Balance contact guard;verbal cues  -AE     Dynamic Standing Balance verbal cues;minimal assist  -AE     Position/Device Used, Standing Balance supported;walker, front-wheeled  -AE               User Key  (r) = Recorded By, (t) = Taken By, (c) = Cosigned By      Initials Name  Provider Type    AE Calderon Beard, ANNEMARIE Physical Therapist                   Goals/Plan    No documentation.                  Clinical Impression       Row Name 03/13/25 1104          Pain    Pain Location extremity  -AE     Pain Side/Orientation bilateral;lower  -AE     Pain Management Interventions activity modification encouraged;exercise or physical activity utilized  -AE     Response to Pain Interventions activity participation with tolerable pain  -AE     Additional Documentation Pain Scale: FACES Pre/Post-Treatment (Group)  -AE       Row Name 03/13/25 1104          Pain Scale: FACES Pre/Post-Treatment    Pain: FACES Scale, Pretreatment 2-->hurts little bit  -AE     Posttreatment Pain Rating 2-->hurts little bit  -AE       Row Name 03/13/25 1104          Plan of Care Review    Plan of Care Reviewed With patient  -AE     Progress improving  -AE     Outcome Evaluation Pt continues to present with decreased functional mobility and decreased activity tolerance compared to baseline. Pt ambulated 4ft + 5ft with min A and RW for support. Continue to progress per pt tolerance.  -AE       Row Name 03/13/25 1104          Vital Signs    Pre Systolic BP Rehab 103  -AE     Pre Treatment Diastolic BP 74  -AE     Pre SpO2 (%) 100  -AE     O2 Delivery Pre Treatment room air  -AE     O2 Delivery Intra Treatment room air  -AE     Post SpO2 (%) 94  -AE     O2 Delivery Post Treatment room air  -AE     Pre Patient Position Sitting  -AE     Intra Patient Position Standing  -AE     Post Patient Position Sitting  -AE       Row Name 03/13/25 1104          Positioning and Restraints    Pre-Treatment Position sitting in chair/recliner  -AE     Post Treatment Position chair  -AE     In Chair notified nsg;call light within reach;encouraged to call for assist;exit alarm on;waffle cushion;legs elevated;heels elevated  -AE               User Key  (r) = Recorded By, (t) = Taken By, (c) = Cosigned By      Initials Name Provider Type    AE  Calderon Beard, PT Physical Therapist                   Outcome Measures       Row Name 03/13/25 1107          How much help from another person do you currently need...    Turning from your back to your side while in flat bed without using bedrails? 2  -AE     Moving from lying on back to sitting on the side of a flat bed without bedrails? 2  -AE     Moving to and from a bed to a chair (including a wheelchair)? 3  -AE     Standing up from a chair using your arms (e.g., wheelchair, bedside chair)? 3  -AE     Climbing 3-5 steps with a railing? 2  -AE     To walk in hospital room? 3  -AE     AM-PAC 6 Clicks Score (PT) 15  -AE     Highest Level of Mobility Goal 4 --> Transfer to chair/commode  -AE       Row Name 03/13/25 1107          Functional Assessment    Outcome Measure Options AM-PAC 6 Clicks Basic Mobility (PT)  -AE               User Key  (r) = Recorded By, (t) = Taken By, (c) = Cosigned By      Initials Name Provider Type    AE Calderon Beard, PT Physical Therapist                                 Physical Therapy Education       Title: PT OT SLP Therapies (In Progress)       Topic: Physical Therapy (In Progress)       Point: Mobility training (In Progress)       Learning Progress Summary            Patient Acceptance, E, NR by AE at 3/13/2025 1018                      Point: Home exercise program (Not Started)       Learner Progress:  Not documented in this visit.              Point: Body mechanics (In Progress)       Learning Progress Summary            Patient Acceptance, E, NR by AE at 3/13/2025 1018                      Point: Precautions (In Progress)       Learning Progress Summary            Patient Acceptance, E, NR by AE at 3/13/2025 1018                                      User Key       Initials Effective Dates Name Provider Type Discipline    AE 09/21/21 -  Calderon Beard PT Physical Therapist PT                  PT Recommendation and Plan     Progress: improving  Outcome Evaluation: Pt  continues to present with decreased functional mobility and decreased activity tolerance compared to baseline. Pt ambulated 4ft + 5ft with min A and RW for support. Continue to progress per pt tolerance.     Time Calculation:         PT Charges       Row Name 03/13/25 1108             Time Calculation    Start Time 1018  -AE      PT Received On 03/13/25  -AE      PT Goal Re-Cert Due Date 03/20/25  -AE         Timed Charges    56975 - PT Therapeutic Activity Minutes 27  -AE         Total Minutes    Timed Charges Total Minutes 27  -AE       Total Minutes 27  -AE                User Key  (r) = Recorded By, (t) = Taken By, (c) = Cosigned By      Initials Name Provider Type    AE Calderon Beard, PT Physical Therapist                  Therapy Charges for Today       Code Description Service Date Service Provider Modifiers Qty    95448074185 HC PT THERAPEUTIC ACT EA 15 MIN 3/13/2025 Calderon Beard PT GP 2            PT G-Codes  Outcome Measure Options: AM-PAC 6 Clicks Basic Mobility (PT)  AM-PAC 6 Clicks Score (PT): 15  AM-PAC 6 Clicks Score (OT): 16  PT Discharge Summary  Anticipated Discharge Disposition (PT): inpatient rehabilitation facility    Calderon Beard PT  3/13/2025

## 2025-03-13 NOTE — CASE MANAGEMENT/SOCIAL WORK
Continued Stay Note  [facility]     Patient Name: Romel Wilson  MRN: [id number]  Today's Date: 3/13/2025    Admit Date: 3/9/2025    Plan: SNF   Discharge Plan       Row Name 03/13/25 1413       Plan    Plan SNF    Patient/Family in Agreement with Plan yes    Plan Comments Met with Ms. Wilson at the bedside to discuss discharge plan. Informed her that [facility] no longer has female beds available. She is agreeable to all 3 [place] locations. Spoke to [place] liaison, and [facility] is reviewing. She would likely have a $10/day copay and may need a bariatric bed. Will need to follow up with [place] again tomorrow and send out more referrals if they cannot offer a bed.  will continue to follow plan of care and assist with discharge planning needs as indicated.    Final Discharge Disposition Code 03 - skilled nursing facility (SNF)                   Discharge Codes    No documentation.                 Expected Discharge Date and Time       Expected Discharge Date Expected Discharge Time    Mar 14, 2025               Eulalia Cooper RN

## 2025-03-13 NOTE — PROGRESS NOTES
[facility] Medicine Services  PROGRESS NOTE    Patient Name: Romel Wilson  : 1950  MRN: [id number]    Date of Admission: 3/9/2025  Primary Care Physician: Hafsa Plummer MD    Subjective   Subjective     CC: LLE cellulitis f/u    HPI:  She reports legs are much improved and nearly to baseline  Affirms wish to go to rehab - was independent in home w rollator before admission    Reports baseline BP 90's at home as well - denies dizziness or lightheadedness including when working w PT/OT tmrw      Objective   Objective     Vital Signs:   Temp:  [97 °F (36.1 °C)-98 °F (36.7 °C)] 97.8 °F (36.6 °C)  Heart Rate:  [62-73] 73  Resp:  [16-18] 16  BP: ()/(48-72) 103/72     Physical Exam:  Constitutional: No acute distress, awake, alert female sitting in recliner w legs propped up  Respiratory: Clear to auscultation bilaterally, respiratory effort normal   Cardiovascular: RRR, no murmurs, rubs, or gallops, no LE edema  Gastrointestinal: Soft, nontender, nondistended  Musculoskeletal: Muscle tone within normal limits, no joint effusions appreciated  Psychiatric: Appropriate affect, cooperative  Neurologic: Alert and oriented, speech clear  Lower extremity exam: mild skin thickening of left shin appreciated most c/w chronic venous dermatitis changes, no erythema on today's exam. No pitting edema    Results Reviewed:  LAB RESULTS:      Lab 25  0545 03/10/25  0430 25  1535 25  1254 25  1122 25  1014   WBC 9.72 11.25*  --   --   --  11.22*   HEMOGLOBIN 12.6 12.1  --   --   --  12.2   HEMATOCRIT 39.8 39.0  --   --   --  38.7   PLATELETS 275 273  --   --   --  285   NEUTROS ABS  --  7.74*  --   --   --  7.85*   IMMATURE GRANS (ABS)  --  0.05  --   --   --  0.04   LYMPHS ABS  --  2.42  --   --   --  2.25   MONOS ABS  --  0.87  --   --   --  0.93*   EOS ABS  --  0.14  --   --   --  0.13   MCV 93.2 93.5  --   --   --  92.1   CRP  --   --   --   --   --  1.30*    PROCALCITONIN  --   --   --   --   --  0.04   LACTATE  --   --  1.9  --   --  1.3   HSTROP T  --   --   --  13 17* 12         Lab 03/12/25  1706 03/12/25  0408 03/11/25  0545 03/10/25  1447 03/10/25  0430 03/09/25  1014   SODIUM  --  137 140  --  142 140   POTASSIUM 4.2 3.6 3.9 4.2 3.6 4.1   CHLORIDE  --  98 96*  --  100 101   CO2  --  30.0* 31.0*  --  30.0* 28.0   ANION GAP  --  9.0 13.0  --  12.0 11.0   BUN  --  14 12  --  10 10   CREATININE  --  0.88 1.16*  --  1.09* 1.05*   EGFR  --  68.6 49.3*  --  53.1* 55.5*   GLUCOSE  --  85 101*  --  93 98   CALCIUM  --  8.8 9.2  --  8.7 9.0   MAGNESIUM  --   --   --   --  1.9  --    TSH  --   --   --   --  4.510*  --          Lab 03/10/25  0430 03/09/25  1014   TOTAL PROTEIN 6.2 6.9   ALBUMIN 3.5 3.5   GLOBULIN 2.7 3.4   ALT (SGPT) 9 9   AST (SGOT) 16 17   BILIRUBIN 0.4 0.5   ALK PHOS 96 94         Lab 03/09/25  1254 03/09/25  1122 03/09/25  1014   PROBNP  --   --  132.9   HSTROP T 13 17* 12                 Brief Urine Lab Results  (Last result in the past 365 days)        Color   Clarity   Blood   Leuk Est   Nitrite   Protein   CREAT   Urine HCG        03/10/25 0051             16.6         03/10/25 0051 Yellow   Clear   Negative   Negative   Negative   Negative                   Microbiology Results Abnormal       None            No radiology results from the last 24 hrs    Results for orders placed during the hospital encounter of 03/09/25    Adult Transthoracic Echo Complete W/ Cont if Necessary Per Protocol    Interpretation Summary    Very poor acoustic windows.  All cardiac structures poorly visualized.    Left ventricular systolic function is normal. Calculated left ventricular EF = 66.7% Left ventricular ejection fraction appears to be 66 - 70%.    The right ventricular cavity is mildly dilated with normal systolic function by visual estimation.    Mild aortic regurgitation is present.      Current medications:  Scheduled Meds:allopurinol, 200 mg, Oral,  Daily  bumetanide, 1 mg, Oral, Daily  cefTRIAXone, 2,000 mg, Intravenous, Daily  cholecalciferol, 1,000 Units, Oral, Daily  enoxaparin sodium, 60 mg, Subcutaneous, Q12H  guaifenesin-dextromethorphan 600-30 mg, 1 tablet, Oral, Q12H  levothyroxine, 100 mcg, Oral, Q AM  pharmacy consult - MTM, , Not Applicable, Daily  senna-docusate sodium, 2 tablet, Oral, BID   And  polyethylene glycol, 17 g, Oral, Daily  revefenacin, 175 mcg, Nebulization, Daily - RT  rosuvastatin, 20 mg, Oral, Nightly  sodium chloride, 10 mL, Intravenous, Q12H      Continuous Infusions:   PRN Meds:.  acetaminophen **OR** acetaminophen **OR** acetaminophen    senna-docusate sodium **AND** polyethylene glycol **AND** bisacodyl **AND** bisacodyl    Calcium Replacement - Follow Nurse / BPA Driven Protocol    ipratropium-albuterol    Magnesium Low Dose Replacement - Follow Nurse / BPA Driven Protocol    ondansetron ODT **OR** ondansetron    Phosphorus Replacement - Follow Nurse / BPA Driven Protocol    Potassium Replacement - Follow Nurse / BPA Driven Protocol    [COMPLETED] Insert Peripheral IV **AND** sodium chloride    sodium chloride    sodium chloride    Assessment & Plan   Assessment & Plan     Active Hospital Problems    Diagnosis  POA    **Cellulitis of left lower extremity [L03.116]  Yes    Hypothyroidism (acquired) [E03.9]  Unknown    Obstructive sleep apnea, adult [G47.33]  Yes      Resolved Hospital Problems   No resolved problems to display.        Brief Hospital Course to date:  Romel Wilson is a 75 y.o. female w BMI>50, chronic lymphedema using home ACE wraps who presented with LLE redness and swelling. LLE duplex negative. Found to have LLE cellulitis      LLE cellulitis - improved  Chronic lymphedema  -D5 of ceftriaxone today to complete cellulitis trx, much improved  -PT wound while here and OP follow-up to facilitate future care  -continue home bumex dosing for now, may need titration down in future. Repeat BMP within 7 days. ECHO w  normal EF, defer to OP w/u    Acute on chronic debility  -prior baseline: indept for in-home activities w rollator  -requiring rehab, pending plcmt + precert    Chronic hypotension - patient reports baseline SBP 90's, asymptomatic so will not pursue trx at this time    NANCY - home cpap  Hypothyroidism - levothyroxine  HLD - crestor  Gout- allopurinol  BMI 53    Expected Discharge Location and Transportation: rehab placemen  Expected Discharge medically ready pending plcmt/precert  Expected Discharge Date: 3/14/2025; Expected Discharge Time:      VTE Prophylaxis:  Pharmacologic & mechanical VTE prophylaxis orders are present.         AM-PAC 6 Clicks Score (PT): 12 (03/12/25 2000)    CODE STATUS:   Code Status and Medical Interventions: No CPR (Do Not Attempt to Resuscitate); Limited Support; No intubation (DNI)   Ordered at: 03/10/25 1203     Code Status (Patient has no pulse and is not breathing):    No CPR (Do Not Attempt to Resuscitate)     Medical Interventions (Patient has pulse or is breathing):    Limited Support     Medical Intervention Limits:    No intubation (DNI)     Level Of Support Discussed With:    Patient       Katherine Bynum MD  03/13/25

## 2025-03-13 NOTE — PLAN OF CARE
Goal Outcome Evaluation:  Plan of Care Reviewed With: patient        Progress: improving                  Problem: Adult Inpatient Plan of Care  Goal: Absence of Hospital-Acquired Illness or Injury  Intervention: Identify and Manage Fall Risk  Description: Perform standard risk assessment on admission using a validated tool or comprehensive approach appropriate to the patient; reassess fall risk frequently, with change in status or transfer to another level of care.Communicate risk to interprofessional healthcare team; ensure fall risk visible cue.Determine need for increased observation, equipment and environmental modification, as well as use of supportive, nonskid footwear.Adjust safety measures to individual needs and identified risk factors.Reinforce the importance of active participation with fall risk prevention, safety, and physical activity with the patient and family.Perform regular intentional rounding to assess need for position change, pain assessment and personal needs, including assistance with toileting.  Recent Flowsheet Documentation  Taken 3/12/2025 2000 by Pawan Biswas RN  Safety Promotion/Fall Prevention: activity supervised  Intervention: Prevent Skin Injury  Description: Perform a screening for skin injury risk, such as pressure or moisture-associated skin damage on admission and at regular intervals throughout hospital stay.Keep all areas of skin (especially folds) clean and dry.Maintain adequate skin hydration.Relieve and redistribute pressure and protect bony prominences and skin at risk for injury; implement measures based on patient-specific risk factors.Match turning and repositioning schedule to clinical condition.Encourage weight shift frequently; assist with reposition if unable to complete independently.Float heels off bed; avoid pressure on the Achilles tendon.Keep skin free from extended contact with medical devices.Optimize nutrition and hydration.Encourage functional  activity and mobility, as early as tolerated.Use aids (e.g., slide boards, mechanical lift) during transfer.  Recent Flowsheet Documentation  Taken 3/13/2025 0200 by Pawan Biswas RN  Body Position:   position changed independently   side-lying   right  Taken 3/13/2025 0000 by Pawan Biswas RN  Body Position:   side-lying   right  Taken 3/12/2025 2200 by Pawan Biswas RN  Body Position: (sit on chair) legs elevated  Taken 3/12/2025 2000 by Pawan Biswas RN  Body Position: (sit on chair) legs elevated     Problem: Adult Inpatient Plan of Care  Goal: Absence of Hospital-Acquired Illness or Injury  Intervention: Prevent Skin Injury  Description: Perform a screening for skin injury risk, such as pressure or moisture-associated skin damage on admission and at regular intervals throughout hospital stay.Keep all areas of skin (especially folds) clean and dry.Maintain adequate skin hydration.Relieve and redistribute pressure and protect bony prominences and skin at risk for injury; implement measures based on patient-specific risk factors.Match turning and repositioning schedule to clinical condition.Encourage weight shift frequently; assist with reposition if unable to complete independently.Float heels off bed; avoid pressure on the Achilles tendon.Keep skin free from extended contact with medical devices.Optimize nutrition and hydration.Encourage functional activity and mobility, as early as tolerated.Use aids (e.g., slide boards, mechanical lift) during transfer.  Recent Flowsheet Documentation  Taken 3/13/2025 0200 by Pawan Biswas RN  Body Position:   position changed independently   side-lying   right  Taken 3/13/2025 0000 by Pawan Biswas RN  Body Position:   side-lying   right  Taken 3/12/2025 2200 by Pawan Biswas RN  Body Position: (sit on chair) legs elevated  Taken 3/12/2025 2000 by Pawan Biswas RN  Body Position: (sit on chair) legs elevated

## 2025-03-13 NOTE — PLAN OF CARE
Goal Outcome Evaluation:  Plan of Care Reviewed With: patient        Progress: improving  Outcome Evaluation: Pt continues to present with decreased functional mobility and decreased activity tolerance compared to baseline. Pt ambulated 4ft + 5ft with min A and RW for support. Continue to progress per pt tolerance.    Anticipated Discharge Disposition (PT): inpatient rehabilitation facility

## 2025-03-13 NOTE — THERAPY WOUND CARE TREATMENT
Acute Care - Wound/Debridement Treatment Note  [facility]     Patient Name: Romel Wilson  : 1950  MRN: [id number]  Today's Date: 3/13/2025                Admit Date: 3/9/2025    Visit Dx:    ICD-10-CM ICD-9-CM   1. Pain of left lower extremity  M79.605 729.5   2. Cellulitis of left lower extremity  L03.116 682.6       Patient Active Problem List   Diagnosis    Obstructive sleep apnea, adult    Morbid obesity due to excess calories    Cellulitis of left lower extremity    Hypothyroidism (acquired)        Past Medical History:   Diagnosis Date    Arthritis     Disease of thyroid gland     Gout         Past Surgical History:   Procedure Laterality Date    HYSTERECTOMY      THYROIDECTOMY                 Lymphedema       Row Name 25             Lymphedema Edema Assessment    Ptting Edema Category By severity  -      Pitting Edema Mild  -         Skin Changes/Observations    Location/Assessment Lower Extremity  -      Lower Extremity Conditions scaly;crust;left:;right:;intact;clean;dry  -         Compression/Skin Care    Compression/Skin Care skin care;wrapping location;bandaging  -      Skin Care washed/dried;lotion applied;moisturizing lotion applied  -      Wrapping Location lower extremity  -      Wrapping Location LE bilateral:;foot to knee  -      Wrapping Comments size 5/6 compressogrip doubled and overlapping for gradient compression.  -                User Key  (r) = Recorded By, (t) = Taken By, (c) = Cosigned By      Initials Name Provider Type    Onesimo Coleman, PT Physical Therapist                    WOUND DEBRIDEMENT                     PT Assessment (Last 12 Hours)       PT Evaluation and Treatment       Row Name 25 [id number]          Physical Therapy Time and Intention    Subjective Information complains of;weakness;fatigue  -     Document Type therapy note (daily note);wound care  -     Mode of Treatment individual therapy;physical therapy  -        Row Name 03/13/25 0930          Pain    Pretreatment Pain Rating 2/10  -     Posttreatment Pain Rating 2/10  -     Pain Location extremity  -MF     Pain Side/Orientation left;lower  -       Row Name 03/13/25 0930          Coping    Observed Emotional State calm;cooperative  -     Verbalized Emotional State acceptance  -     Trust Relationship/Rapport care explained  -       Row Name 03/13/25 0930          Plan of Care Review    Plan of Care Reviewed With patient  -     Outcome Evaluation Pt cont to have mild BLE edema with dry flaking skin to LLE. PT reapplied MLW to help further reduce LE edema and improve skin integrity and mobiltiy.  -       Row Name 03/13/25 0930          Positioning and Restraints    Pre-Treatment Position sitting in chair/recliner  -     Post Treatment Position chair  -MF     In Chair reclined;call light within reach  -               User Key  (r) = Recorded By, (t) = Taken By, (c) = Cosigned By      Initials Name Provider Type    MF Onesimo Torres, PT Physical Therapist                  Physical Therapy Education       Title: PT OT SLP Therapies (In Progress)       Topic: Physical Therapy (In Progress)       Point: Mobility training (In Progress)       Learning Progress Summary            Patient Acceptance, E, NR by AE at 3/13/2025 1018                      Point: Home exercise program (Not Started)       Learner Progress:  Not documented in this visit.              Point: Body mechanics (In Progress)       Learning Progress Summary            Patient Acceptance, E, NR by AE at 3/13/2025 1018                      Point: Precautions (In Progress)       Learning Progress Summary            Patient Acceptance, E, NR by AE at 3/13/2025 1018                                      User Key       Initials Effective Dates Name Provider Type Discipline    AE 09/21/21 -  Calderon Beard PT Physical Therapist PT                    Recommendation and Plan  Anticipated Discharge  Disposition (PT): inpatient rehabilitation facility  Planned Therapy Interventions (PT): wound care  Therapy Frequency (PT): daily  Plan of Care Reviewed With: patient           Outcome Evaluation: Pt cont to have mild BLE edema with dry flaking skin to LLE. PT reapplied MLW to help further reduce LE edema and improve skin integrity and mobiltiy.  Plan of Care Reviewed With: patient            Time Calculation   PT Charges       Row Name 03/13/25 1108 03/13/25 0930          Time Calculation    Start Time 1018  -AE 0930  -MF     PT Received On 03/13/25  -AE --     PT Goal Re-Cert Due Date 03/20/25  -AE 03/20/25  -MF        Timed Charges    73291 - PT Therapeutic Activity Minutes 27  -AE --        Untimed Charges    49826-[address]y comp below knee -- 25  -MF        Total Minutes    Timed Charges Total Minutes 27  -AE --     Untimed Charges Total Minutes -- 25  -MF      Total Minutes 27  -AE 25  -MF               User Key  (r) = Recorded By, (t) = Taken By, (c) = Cosigned By      Initials Name Provider Type    Onesimo Coleman, PT Physical Therapist    Calderon Michele, PT Physical Therapist                            PT G-Codes  Outcome Measure Options: AM-PAC 6 Clicks Basic Mobility (PT)  AM-PAC 6 Clicks Score (PT): 15  AM-PAC 6 Clicks Score (OT): 16       Onesimo Torres, PT  3/13/2025

## 2025-03-14 LAB
BACTERIA SPEC AEROBE CULT: NORMAL
BACTERIA SPEC AEROBE CULT: NORMAL

## 2025-03-14 PROCEDURE — 97530 THERAPEUTIC ACTIVITIES: CPT | Performed by: OCCUPATIONAL THERAPIST

## 2025-03-14 PROCEDURE — 94664 DEMO&/EVAL PT USE INHALER: CPT

## 2025-03-14 PROCEDURE — 25010000002 ENOXAPARIN PER 10 MG: Performed by: HOSPITALIST

## 2025-03-14 PROCEDURE — 63710000001 REVEFENACIN 175 MCG/3ML SOLUTION: Performed by: STUDENT IN AN ORGANIZED HEALTH CARE EDUCATION/TRAINING PROGRAM

## 2025-03-14 PROCEDURE — 99232 SBSQ HOSP IP/OBS MODERATE 35: CPT | Performed by: INTERNAL MEDICINE

## 2025-03-14 PROCEDURE — 97535 SELF CARE MNGMENT TRAINING: CPT | Performed by: OCCUPATIONAL THERAPIST

## 2025-03-14 PROCEDURE — 94799 UNLISTED PULMONARY SVC/PX: CPT

## 2025-03-14 RX ADMIN — ENOXAPARIN SODIUM 60 MG: 60 INJECTION SUBCUTANEOUS at 06:11

## 2025-03-14 RX ADMIN — LEVOTHYROXINE SODIUM 100 MCG: 0.1 TABLET ORAL at 06:11

## 2025-03-14 RX ADMIN — ALLOPURINOL 200 MG: 100 TABLET ORAL at 09:05

## 2025-03-14 RX ADMIN — GUAIFENESIN AND DEXTROMETHORPHAN HYDROBROMIDE 1 TABLET: 600; 30 TABLET, EXTENDED RELEASE ORAL at 21:16

## 2025-03-14 RX ADMIN — ROSUVASTATIN 20 MG: 20 TABLET, FILM COATED ORAL at 21:16

## 2025-03-14 RX ADMIN — ACETAMINOPHEN 650 MG: 325 TABLET, FILM COATED ORAL at 21:18

## 2025-03-14 RX ADMIN — ENOXAPARIN SODIUM 60 MG: 60 INJECTION SUBCUTANEOUS at 17:43

## 2025-03-14 RX ADMIN — GUAIFENESIN AND DEXTROMETHORPHAN HYDROBROMIDE 1 TABLET: 600; 30 TABLET, EXTENDED RELEASE ORAL at 09:05

## 2025-03-14 RX ADMIN — REVEFENACIN 175 MCG: 175 SOLUTION RESPIRATORY (INHALATION) at 07:34

## 2025-03-14 RX ADMIN — BUMETANIDE 1 MG: 1 TABLET ORAL at 09:06

## 2025-03-14 NOTE — PLAN OF CARE
Problem: Adult Inpatient Plan of Care  Goal: Plan of Care Review  Recent Flowsheet Documentation  Taken 3/14/2025 1145 by Angela Paul, OT  Progress: improving  Outcome Evaluation: Pt. demonstrated increasing ADL & functional mobility independence, ambulating household distance with min A x 1 on RWx. Pt. completed LBD activity with SUP using sock aid. Pt. continues to present below functional baseline & continues to warrant OT skilled services to return to PLOF. Recommend IPRF upon d/c.   Goal Outcome Evaluation:  Plan of Care Reviewed With: patient        Progress: improving  Outcome Evaluation: Pt. demonstrated increasing ADL & functional mobility independence, ambulating household distance with min A x 1 on RWx. Pt. completed LBD activity with SUP using sock aid. Pt. continues to present below functional baseline & continues to warrant OT skilled services to return to PLOF. Recommend IPRF upon d/c.    Anticipated Discharge Disposition (OT): inpatient rehabilitation facility

## 2025-03-14 NOTE — PLAN OF CARE
Goal Outcome Evaluation:  Plan of Care Reviewed With: patient        Progress: improving            Problem: Adult Inpatient Plan of Care  Goal: Absence of Hospital-Acquired Illness or Injury  Intervention: Identify and Manage Fall Risk  Description: Perform standard risk assessment on admission using a validated tool or comprehensive approach appropriate to the patient; reassess fall risk frequently, with change in status or transfer to another level of care.Communicate risk to interprofessional healthcare team; ensure fall risk visible cue.Determine need for increased observation, equipment and environmental modification, as well as use of supportive, nonskid footwear.Adjust safety measures to individual needs and identified risk factors.Reinforce the importance of active participation with fall risk prevention, safety, and physical activity with the patient and family.Perform regular intentional rounding to assess need for position change, pain assessment and personal needs, including assistance with toileting.  Recent Flowsheet Documentation  Taken 3/13/2025 2000 by Pawan Biswas RN  Safety Promotion/Fall Prevention: activity supervised  Intervention: Prevent Skin Injury  Description: Perform a screening for skin injury risk, such as pressure or moisture-associated skin damage on admission and at regular intervals throughout hospital stay.Keep all areas of skin (especially folds) clean and dry.Maintain adequate skin hydration.Relieve and redistribute pressure and protect bony prominences and skin at risk for injury; implement measures based on patient-specific risk factors.Match turning and repositioning schedule to clinical condition.Encourage weight shift frequently; assist with reposition if unable to complete independently.Float heels off bed; avoid pressure on the Achilles tendon.Keep skin free from extended contact with medical devices.Optimize nutrition and hydration.Encourage functional activity and  mobility, as early as tolerated.Use aids (e.g., slide boards, mechanical lift) during transfer.  Recent Flowsheet Documentation  Taken 3/13/2025 2000 by Pawan Biswas RN  Body Position: (up in chair) position changed independently     Problem: Adult Inpatient Plan of Care  Goal: Absence of Hospital-Acquired Illness or Injury  Intervention: Prevent Skin Injury  Description: Perform a screening for skin injury risk, such as pressure or moisture-associated skin damage on admission and at regular intervals throughout hospital stay.Keep all areas of skin (especially folds) clean and dry.Maintain adequate skin hydration.Relieve and redistribute pressure and protect bony prominences and skin at risk for injury; implement measures based on patient-specific risk factors.Match turning and repositioning schedule to clinical condition.Encourage weight shift frequently; assist with reposition if unable to complete independently.Float heels off bed; avoid pressure on the Achilles tendon.Keep skin free from extended contact with medical devices.Optimize nutrition and hydration.Encourage functional activity and mobility, as early as tolerated.Use aids (e.g., slide boards, mechanical lift) during transfer.  Recent Flowsheet Documentation  Taken 3/13/2025 2000 by Pawan Biswas RN  Body Position: (up in chair) position changed independently

## 2025-03-14 NOTE — DISCHARGE PLACEMENT REQUEST
"Romel Morales (75 y.o. Female)       Date of Birth   1950    Social Security Number       Address   [address]   [place] [address]    Home Phone   [phone]    MRN   [id number]       [facility]    Marital Status   Single                            Admission Date   3/9/2025    Admission Type   Emergency    Admitting Provider   Katherine Bynum MD    Attending Provider   Katherine Bynum MD    Department, Room/Bed   [facility] 5H, S584/1       Discharge Date       Discharge Disposition       Discharge Destination                                 Attending Provider: Katherine Bynum MD    Allergies: No Known Allergies    Isolation: None   Infection: None   Code Status: No CPR    Ht: 165.1 cm (65\")   Wt: 146 kg (321 lb 14 oz)    Admission Cmt: None   Principal Problem: Cellulitis of left lower extremity [L03.116]                   Active Insurance as of 3/9/2025       Primary Coverage       Payor Plan Insurance Group Employer/Plan Group    HUMANA MEDICARE REPLACEMENT HUMANA MEDICARE REPLACEMENT [id number]       Payor Plan Address Payor Plan Phone Number Payor Plan Fax Number Effective Dates    [address] [address] [phone]  2018 - None Entered    [facility] [id number]         Subscriber Name Subscriber Birth Date Member ID       ROMEL MORALES 1950 [id number]                     Emergency Contacts        (Rel.) Home Phone Work Phone Mobile Phone    Kurt Alberto (Son) [phone] -- [phone]    YamilDayna (Friend) -- -- [phone]                 History & Physical        Lisandro Godoy MD at [address] [place] [address] Medicine Services  HISTORY AND PHYSICAL    Patient Name: Romel Morales  : 1950  MRN: [id number]  Primary Care Physician: Lena Bush MD  Date of admission: 3/9/2025    Subjective  Subjective     Chief Complaint: Lower extremity pain and swelling.    HPI:  Romel Morales is a 75 " y.o. female with past medical history of morbid obesity with BMI of 53, hypothyroidism, obstructive sleep apnea noncompliant with CPAP and chronic lower extremity lymphedema?.  Patient presented to our ED today with worsening left lower extremity pain and swelling.  Started December 24.  Getting worse.  Associated with skin discoloration.  She was seen by her PCP last month.  Bumex was doubled.  Several lower extremity testing including ultrasound were done with ankle and foot specialist and cardiology.  Reportedly where no acute DVTs.  In ED patient was hemodynamically stable.  On room air.  Labs showed mild leukocytosis with white blood count of 11.2 normal Pro-Bruno, lactate, and proBNP.  CRP 1.3.  Duplex today ordered and pending.  Chest x-ray negative.  Patient received dose of ceftriaxone and vancomycin in the ED for possible left lower extremity cellulitis.  [facility] was asked to admit the patient for further eval.        Review of Systems   Constitutional:  Positive for fatigue. Negative for fever.   Respiratory:  Negative for shortness of breath.    Cardiovascular:  Positive for leg swelling.   Gastrointestinal:  Positive for abdominal distention. Negative for anal bleeding.   Musculoskeletal:  Positive for arthralgias.          Personal History     Past Medical History:   Diagnosis Date    Arthritis     Disease of thyroid gland     Gout              Past Surgical History:   Procedure Laterality Date    HYSTERECTOMY      THYROIDECTOMY         Family History:  family history includes Hypertension in her mother.     Social History:  reports that she has been smoking cigarettes. She has never used smokeless tobacco. She reports that she does not drink alcohol and does not use drugs.  Social History     Social History Narrative    Not on file       Medications:  bumetanide, cholecalciferol, hydrOXYzine pamoate, levothyroxine, potassium chloride, rosuvastatin, vitamin B-12, and vitamin C    No Known  Allergies    Objective  Objective     Vital Signs:   Temp:  [98.8 °F (37.1 °C)] 98.8 °F (37.1 °C)  Heart Rate:  [61-75] 69  Resp:  [20] 20  BP: ()/(53-96) 129/56    Physical Exam  Vitals and nursing note reviewed.   Constitutional:       Appearance: She is obese.   HENT:      Head: Normocephalic.      Mouth/Throat:      Mouth: Mucous membranes are moist.   Cardiovascular:      Rate and Rhythm: Normal rate.   Pulmonary:      Effort: Pulmonary effort is normal.   Abdominal:      General: There is distension.      Tenderness: There is no abdominal tenderness.   Musculoskeletal:         General: Swelling and tenderness present.      Left lower leg: Edema present.   Neurological:      General: No focal deficit present.      Mental Status: She is alert.   Psychiatric:         Mood and Affect: Mood normal.            Result Review:  I have personally reviewed the results from the time of this admission to 3/9/2025 15:11 EDT and agree with these findings:  [x]  Laboratory list / accordion  []  Microbiology  [x]  Radiology  []  EKG/Telemetry   []  Cardiology/Vascular   []  Pathology  [x]  Old records  []  Other:  Most notable findings include:     LAB RESULTS:      Lab 03/09/25  1014   WBC 11.22*   HEMOGLOBIN 12.2   HEMATOCRIT 38.7   PLATELETS 285   NEUTROS ABS 7.85*   IMMATURE GRANS (ABS) 0.04   LYMPHS ABS 2.25   MONOS ABS 0.93*   EOS ABS 0.13   MCV 92.1   CRP 1.30*   PROCALCITONIN 0.04   LACTATE 1.3         Lab 03/09/25  1014   SODIUM 140   POTASSIUM 4.1   CHLORIDE 101   CO2 28.0   ANION GAP 11.0   BUN 10   CREATININE 1.05*   EGFR 55.5*   GLUCOSE 98   CALCIUM 9.0         Lab 03/09/25  1014   TOTAL PROTEIN 6.9   ALBUMIN 3.5   GLOBULIN 3.4   ALT (SGPT) 9   AST (SGOT) 17   BILIRUBIN 0.5   ALK PHOS 94         Lab 03/09/25  1254 03/09/25  1122 03/09/25  1014   PROBNP  --   --  132.9   HSTROP T 13 17* 12                 Brief Urine Lab Results       None          Microbiology Results (last 10 days)       ** No results  found for the last 240 hours. **            XR Chest 1 View  Result Date: 3/9/2025  XR CHEST 1 VW Date of Exam: 3/9/2025 10:30 AM EDT Indication: cough Comparison: Low-dose chest CT [address] 23 Findings: Examination limited by body habitus. Cardiomediastinal silhouette is unremarkable. There is been a sternotomy. There is generalized interstitial prominence, similar to previous CT given differences in modality. No airspace disease, pneumothorax, nor pleural effusion. No acute osseous abnormality identified.     Impression: Impression: 1.No acute process identified Electronically Signed: Dwayne Shaw MD  3/9/2025 10:38 AM EDT  Workstation ID: [id number]          Assessment & Plan  Assessment & Plan       Cellulitis of left lower extremity    Obstructive sleep apnea, adult    Hypothyroidism (acquired)    Romel Wilson is a 75 y.o. female with past medical history of morbid obesity with BMI of 53, hypothyroidism, obstructive sleep apnea noncompliant with CPAP and chronic lower extremity lymphedema?. Patient presented to our ED today with worsening left lower extremity pain and swelling.     ## Acute on chronic lower extremity edema.  Failed p.o. diuretics  ## Chronic venous stasis with concern for left lower extremity purulent cellulitis  ## Morbid obesity complicating all aspects of care  - She was seen by her PCP last month.  Bumex was doubled with no improvement.    - Several lower extremity testing including ultrasound were done with ankle and foot specialist and cardiology.   - Reportedly where no acute DVTs.    - In ED patient was hemodynamically stable.  On room air.    - Labs showed mild leukocytosis with white blood count of 11.2 normal Pro-Bruno, lactate, and proBNP.  CRP 1.3.    - Duplex today ordered and pending. Chest x-ray negative.    - Patient received dose of ceftriaxone and vancomycin in the ED for possible left lower extremity cellulitis.    -Continue ceftriaxone 2 g IV daily with the plan to switch to  oral antibiotic on discharge.  -No previous echo in the chart.  Ordered  -Bumex 2 g IV x 1 in the ED.  Since patient failed Bumex will try Lasix 40 mg IV twice daily.  -SCDs.  Edema wear.  Leg elevation. PT/OT.  CM to facilitate discharge.  -UA without cultures and microalbumin/creatinine ratio ordered to rule out nephrotic syndrome      ## Obstructive sleep apnea.  Resume home CPAP.    ## Hypothyroidism.  Resume home levothyroxine.  TSH added to the morning labs.    DVT prophylaxis: Heparin subcu    CODE STATUS: Full code       Expected Discharge  Expected Discharge Date: 3/11/2025; Expected Discharge Time:       This note has been completed as part of a split-shared workflow.     Signature: Electronically signed by Lisandro Godoy MD, 25, 3:16 PM EDT               Electronically signed by Lisandro Godoy MD at 25 1802          Physician Progress Notes (most recent note)        Katherine Bynum MD at 25 1034              [facility] Medicine Services  PROGRESS NOTE    Patient Name: Romel Wilsno  : 1950  MRN: [id number]    Date of Admission: 3/9/2025  Primary Care Physician: Hafsa Plummer MD    Subjective   Subjective     CC: LLE cellulitis f/u    HPI:  Doing well  Wraps were too tight overnight so were removed this morning and PT wound updated  Hopeful for rehab today      Objective   Objective     Vital Signs:   Temp:  [97.8 °F (36.6 °C)-98.5 °F (36.9 °C)] 98.4 °F (36.9 °C)  Heart Rate:  [65-70] 70  Resp:  [18] 18  BP: ()/(43-56) 98/43     Physical Exam:  Constitutional: No acute distress, awake, alert female sitting in recliner w legs propped up  Respiratory: Clear to auscultation bilaterally, respiratory effort normal   Cardiovascular: RRR, no murmurs, rubs, or gallops, no LE edema  Gastrointestinal: Soft, nontender, nondistended  Musculoskeletal: Muscle tone within normal limits, no joint effusions appreciated  Psychiatric: Appropriate affect,  cooperative  Neurologic: Alert and oriented, speech clear  Lower extremity exam: mild skin thickening of left shin appreciated most c/w chronic venous dermatitis changes, no erythema, no significant edema    Results Reviewed:  LAB RESULTS:      Lab 03/11/25  0545 03/10/25  0430 03/09/25  1535 03/09/25  1254 03/09/25  1122 03/09/25  1014   WBC 9.72 11.25*  --   --   --  11.22*   HEMOGLOBIN 12.6 12.1  --   --   --  12.2   HEMATOCRIT 39.8 39.0  --   --   --  38.7   PLATELETS 275 273  --   --   --  285   NEUTROS ABS  --  7.74*  --   --   --  7.85*   IMMATURE GRANS (ABS)  --  0.05  --   --   --  0.04   LYMPHS ABS  --  2.42  --   --   --  2.25   MONOS ABS  --  0.87  --   --   --  0.93*   EOS ABS  --  0.14  --   --   --  0.13   MCV 93.2 93.5  --   --   --  92.1   CRP  --   --   --   --   --  1.30*   PROCALCITONIN  --   --   --   --   --  0.04   LACTATE  --   --  1.9  --   --  1.3   HSTROP T  --   --   --  13 17* 12         Lab 03/12/25  1706 03/12/25  0408 03/11/25  0545 03/10/25  1447 03/10/25  0430 03/09/25  1014   SODIUM  --  137 140  --  142 140   POTASSIUM 4.2 3.6 3.9 4.2 3.6 4.1   CHLORIDE  --  98 96*  --  100 101   CO2  --  30.0* 31.0*  --  30.0* 28.0   ANION GAP  --  9.0 13.0  --  12.0 11.0   BUN  --  14 12  --  10 10   CREATININE  --  0.88 1.16*  --  1.09* 1.05*   EGFR  --  68.6 49.3*  --  53.1* 55.5*   GLUCOSE  --  85 101*  --  93 98   CALCIUM  --  8.8 9.2  --  8.7 9.0   MAGNESIUM  --   --   --   --  1.9  --    TSH  --   --   --   --  4.510*  --          Lab 03/10/25  0430 03/09/25  1014   TOTAL PROTEIN 6.2 6.9   ALBUMIN 3.5 3.5   GLOBULIN 2.7 3.4   ALT (SGPT) 9 9   AST (SGOT) 16 17   BILIRUBIN 0.4 0.5   ALK PHOS 96 94         Lab 03/09/25  1254 03/09/25  1122 03/09/25  1014   PROBNP  --   --  132.9   HSTROP T 13 17* 12                 Brief Urine Lab Results  (Last result in the past 365 days)        Color   Clarity   Blood   Leuk Est   Nitrite   Protein   CREAT   Urine HCG        03/10/25 0051              16.6         03/10/25 0051 Yellow   Clear   Negative   Negative   Negative   Negative                   Microbiology Results Abnormal       None            No radiology results from the last 24 hrs    Results for orders placed during the hospital encounter of 03/09/25    Adult Transthoracic Echo Complete W/ Cont if Necessary Per Protocol    Interpretation Summary    Very poor acoustic windows.  All cardiac structures poorly visualized.    Left ventricular systolic function is normal. Calculated left ventricular EF = 66.7% Left ventricular ejection fraction appears to be 66 - 70%.    The right ventricular cavity is mildly dilated with normal systolic function by visual estimation.    Mild aortic regurgitation is present.      Current medications:  Scheduled Meds:allopurinol, 200 mg, Oral, Daily  bumetanide, 1 mg, Oral, Daily  cholecalciferol, 1,000 Units, Oral, Daily  enoxaparin sodium, 60 mg, Subcutaneous, Q12H  guaifenesin-dextromethorphan 600-30 mg, 1 tablet, Oral, Q12H  levothyroxine, 100 mcg, Oral, Q AM  senna-docusate sodium, 2 tablet, Oral, BID   And  polyethylene glycol, 17 g, Oral, Daily  revefenacin, 175 mcg, Nebulization, Daily - RT  rosuvastatin, 20 mg, Oral, Nightly  sodium chloride, 10 mL, Intravenous, Q12H      Continuous Infusions:   PRN Meds:.  acetaminophen **OR** acetaminophen **OR** acetaminophen    senna-docusate sodium **AND** polyethylene glycol **AND** bisacodyl **AND** bisacodyl    Calcium Replacement - Follow Nurse / BPA Driven Protocol    ipratropium-albuterol    Magnesium Low Dose Replacement - Follow Nurse / BPA Driven Protocol    ondansetron ODT **OR** ondansetron    Phosphorus Replacement - Follow Nurse / BPA Driven Protocol    Potassium Replacement - Follow Nurse / BPA Driven Protocol    [COMPLETED] Insert Peripheral IV **AND** sodium chloride    sodium chloride    sodium chloride    Assessment & Plan   Assessment & Plan     Active Hospital Problems    Diagnosis  POA    **Cellulitis of  left lower extremity [L03.116]  Yes    Hypothyroidism (acquired) [E03.9]  Unknown    Obstructive sleep apnea, adult [G47.33]  Yes      Resolved Hospital Problems   No resolved problems to display.        Brief Hospital Course to date:  Romel Wilson is a 75 y.o. female w BMI>50, chronic lymphedema using home ACE wraps who presented with LLE redness and swelling. LLE duplex negative. Found to have LLE cellulitis      LLE cellulitis - improved  Chronic lymphedema  -s/p 5 days of rocephin w resolution of cellulitis  -PT wound while here, patient does wraps at home and declines OP PT wound f/u  -continue home bumex dosing for now, may need titration down in future. Repeat BMP within 7 days. ECHO w normal EF, defer to OP w/u    Acute on chronic debility  -prior baseline: indept for in-home activities w rollator  -requiring rehab, pending plcmt + precert    Chronic hypotension - patient reports baseline SBP 90's, asymptomatic so will not pursue trx at this time    NANCY - home cpap  Hypothyroidism - levothyroxine  HLD - crestor  Gout- allopurinol  BMI 53    Expected Discharge Location and Transportation: rehab placemen  Expected Discharge medically ready pending plcmt/precert  Expected Discharge Date: 3/14/2025; Expected Discharge Time:      VTE Prophylaxis:  Pharmacologic & mechanical VTE prophylaxis orders are present.         AM-PAC 6 Clicks Score (PT): 13 (03/13/25 2000)    CODE STATUS:   Code Status and Medical Interventions: No CPR (Do Not Attempt to Resuscitate); Limited Support; No intubation (DNI)   Ordered at: 03/10/25 1203     Code Status (Patient has no pulse and is not breathing):    No CPR (Do Not Attempt to Resuscitate)     Medical Interventions (Patient has pulse or is breathing):    Limited Support     Medical Intervention Limits:    No intubation (DNI)     Level Of Support Discussed With:    Patient       Katherine Bynum MD  03/14/25        Electronically signed by Katherine Bynum MD at 03/14/25 6569           Physical Therapy Notes (most recent note)        Onesimo Torres, PT at 25 1146  Version 1 of 1         Acute Care - Wound/Debridement Treatment Note   Jay     Patient Name: Romel Wilson  : 1950  MRN: [id number]  Today's Date: 3/13/2025                Admit Date: 3/9/2025    Visit Dx:    ICD-10-CM ICD-9-CM   1. Pain of left lower extremity  M79.605 729.5   2. Cellulitis of left lower extremity  L03.116 682.6       Patient Active Problem List   Diagnosis    Obstructive sleep apnea, adult    Morbid obesity due to excess calories    Cellulitis of left lower extremity    Hypothyroidism (acquired)        Past Medical History:   Diagnosis Date    Arthritis     Disease of thyroid gland     Gout         Past Surgical History:   Procedure Laterality Date    HYSTERECTOMY      THYROIDECTOMY                 Lymphedema       Row Name 25 2223             Lymphedema Edema Assessment    Ptting Edema Category By severity  -      Pitting Edema Mild  -         Skin Changes/Observations    Location/Assessment Lower Extremity  -      Lower Extremity Conditions scaly;crust;left:;right:;intact;clean;dry  -         Compression/Skin Care    Compression/Skin Care skin care;wrapping location;bandaging  -      Skin Care washed/dried;lotion applied;moisturizing lotion applied  -MF      Wrapping Location lower extremity  -MF      Wrapping Location LE bilateral:;foot to knee  -      Wrapping Comments size 5/6 compressogrip doubled and overlapping for gradient compression.  -                User Key  (r) = Recorded By, (t) = Taken By, (c) = Cosigned By      Initials Name Provider Type    MF Onesimo Torres, PT Physical Therapist                    WOUND DEBRIDEMENT                     PT Assessment (Last 12 Hours)       PT Evaluation and Treatment       Row Name 25 3013          Physical Therapy Time and Intention    Subjective Information complains of;weakness;fatigue  -     Document  Type therapy note (daily note);wound care  -MF     Mode of Treatment individual therapy;physical therapy  -       Row Name 03/13/25 0930          Pain    Pretreatment Pain Rating 2/10  -MF     Posttreatment Pain Rating 2/10  -     Pain Location extremity  -     Pain Side/Orientation left;lower  -       Row Name 03/13/25 0930          Coping    Observed Emotional State calm;cooperative  -     Verbalized Emotional State acceptance  -     Trust Relationship/Rapport care explained  -       Row Name 03/13/25 0930          Plan of Care Review    Plan of Care Reviewed With patient  -     Outcome Evaluation Pt cont to have mild BLE edema with dry flaking skin to LLE. PT reapplied MLW to help further reduce LE edema and improve skin integrity and mobiltiy.  -       Row Name 03/13/25 0930          Positioning and Restraints    Pre-Treatment Position sitting in chair/recliner  -     Post Treatment Position chair  -     In Chair reclined;call light within reach  -               User Key  (r) = Recorded By, (t) = Taken By, (c) = Cosigned By      Initials Name Provider Type     Onesimo Torres, ANNEMARIE Physical Therapist                  Physical Therapy Education       Title: PT OT SLP Therapies (In Progress)       Topic: Physical Therapy (In Progress)       Point: Mobility training (In Progress)       Learning Progress Summary            Patient Acceptance, E, NR by AE at 3/13/2025 1018                      Point: Home exercise program (Not Started)       Learner Progress:  Not documented in this visit.              Point: Body mechanics (In Progress)       Learning Progress Summary            Patient Acceptance, E, NR by AE at 3/13/2025 1018                      Point: Precautions (In Progress)       Learning Progress Summary            Patient Acceptance, E, NR by AE at 3/13/2025 1018                                      User Key       Initials Effective Dates Name Provider Type Discipline    AE  21 -  Calderon Beard, PT Physical Therapist PT                    Recommendation and Plan  Anticipated Discharge Disposition (PT): inpatient rehabilitation facility  Planned Therapy Interventions (PT): wound care  Therapy Frequency (PT): daily  Plan of Care Reviewed With: patient           Outcome Evaluation: Pt cont to have mild BLE edema with dry flaking skin to LLE. PT reapplied MLW to help further reduce LE edema and improve skin integrity and mobiltiy.  Plan of Care Reviewed With: patient            Time Calculation   PT Charges       Row Name 25 1108 25 0930          Time Calculation    Start Time 1018  -AE 0930  -MF     PT Received On 25  -AE --     PT Goal Re-Cert Due Date 25  -AE 25  -MF        Timed Charges    28243 - PT Therapeutic Activity Minutes 27  -AE --        Untimed Charges    54773-Stntormurq comp below knee -- 25  -MF        Total Minutes    Timed Charges Total Minutes 27  -AE --     Untimed Charges Total Minutes -- 25  -MF      Total Minutes 27  -AE 25  -MF               User Key  (r) = Recorded By, (t) = Taken By, (c) = Cosigned By      Initials Name Provider Type    Onesimo Coleman, PT Physical Therapist    AE Calderon Beard, PT Physical Therapist                            PT G-Codes  Outcome Measure Options: AM-PAC 6 Clicks Basic Mobility (PT)  AM-PAC 6 Clicks Score (PT): 15  AM-PAC 6 Clicks Score (OT): 16       Onesimo Torres, PT  3/13/2025      Electronically signed by Onesimo Torres, PT at 25 1147          Occupational Therapy Notes (most recent note)        Angela Paul, OT at 25 1149          Patient Name: Romel Wilson  : 1950    MRN: [id number]                              Today's Date: 3/14/2025       Admit Date: 3/9/2025    Visit Dx:     ICD-10-CM ICD-9-CM   1. Pain of left lower extremity  M79.605 729.5   2. Cellulitis of left lower extremity  L03.116 682.6     Patient Active Problem List    Diagnosis    Obstructive sleep apnea, adult    Morbid obesity due to excess calories    Cellulitis of left lower extremity    Hypothyroidism (acquired)     Past Medical History:   Diagnosis Date    Arthritis     Disease of thyroid gland     Gout      Past Surgical History:   Procedure Laterality Date    HYSTERECTOMY      THYROIDECTOMY        General Information       Row Name 03/14/25 1113          OT Time and Intention    Document Type therapy note (daily note)  -SD     Mode of Treatment occupational therapy  -SD     Patient Effort good  -SD     Symptoms Noted During/After Treatment increased pain  -SD       Row Name 03/14/25 1113          General Information    Patient Profile Reviewed yes  -SD     Existing Precautions/Restrictions fall  -SD     Barriers to Rehab medically complex;previous functional deficit  -SD       Row Name 03/14/25 1113          Cognition    Orientation Status (Cognition) oriented x 4  -SD       Row Name 03/14/25 1113          Safety Issues/Impairments Affecting Functional Mobility    Safety Issues Affecting Function (Mobility) insight into deficits/self-awareness;safety precaution awareness;safety precautions follow-through/compliance;sequencing abilities  -SD     Impairments Affecting Function (Mobility) balance;endurance/activity tolerance;shortness of breath;strength;pain;postural/trunk control  -SD               User Key  (r) = Recorded By, (t) = Taken By, (c) = Cosigned By      Initials Name Provider Type    SD Angela Paul, OT Occupational Therapist                   Lymphedema       Row Name 03/13/25 0930             Lymphedema Edema Assessment    Ptting Edema Category By severity  -      Pitting Edema Mild  -MF      Recorded by [MF] Onesimo Torres, PT              Skin Changes/Observations    Location/Assessment Lower Extremity  -      Lower Extremity Conditions scaly;crust;left:;right:;intact;clean;dry  -MF      Recorded by [MF] Onesimo Torres, PT               Compression/Skin Care    Compression/Skin Care skin care;wrapping location;bandaging  -MF      Skin Care washed/dried;lotion applied;moisturizing lotion applied  -MF      Wrapping Location lower extremity  -MF      Wrapping Location LE bilateral:;foot to knee  -MF      Wrapping Comments size 5/6 compressogrip doubled and overlapping for gradient compression.  -MF      Recorded by [MF] Onesimo Torres, PT                User Key  (r) = Recorded By, (t) = Taken By, (c) = Cosigned By      Initials Name Effective Dates     Onesimo Torres, PT 02/03/23 -                    Mobility/ADL's       Row Name 03/14/25 1142          Bed Mobility    Comment, (Bed Mobility) pt. up in chair  -SD       Row Name 03/14/25 1142          Transfers    Transfers sit-stand transfer;stand-sit transfer;toilet transfer  -SD       Row Name 03/14/25 1142          Sit-Stand Transfer    Sit-Stand Bland (Transfers) contact guard;1 person assist;verbal cues  -SD     Assistive Device (Sit-Stand Transfers) walker, front-wheeled  -SD       Row Name 03/14/25 1142          Stand-Sit Transfer    Stand-Sit Bland (Transfers) contact guard;1 person assist;verbal cues  -SD     Assistive Device (Stand-Sit Transfers) walker, front-wheeled  -SD       Row Name 03/14/25 1142          Toilet Transfer    Type (Toilet Transfer) sit-stand;stand-sit  -SD     Bland Level (Toilet Transfer) minimum assist (75% patient effort);1 person assist;verbal cues  -SD     Assistive Device (Toilet Transfer) commode, bedside without drop arms  -SD       Row Name 03/14/25 1142          Functional Mobility    Functional Mobility- Ind. Level 1 person;verbal cues required;minimum assist (75% patient effort)  -SD     Functional Mobility- Device walker, front-wheeled  -SD     Functional Mobility-Distance (Feet) 50  -SD     Functional Mobility- Safety Issues step length decreased;balance decreased during turns  -SD     Patient was able to Ambulate yes  -SD        Row Name 03/14/25 1142          Activities of Daily Living    BADL Assessment/Intervention lower body dressing;upper body dressing;grooming  -SD       Row Name 03/14/25 1142          Lower Body Dressing Assessment/Training    [place] Level (Lower Body Dressing) don;socks;supervision;verbal cues  -SD     Assistive Devices (Lower Body Dressing) reacher;sock-aid  -SD     Position (Lower Body Dressing) supported sitting  -SD       Row Name 03/14/25 1142          Upper Body Dressing Assessment/Training    [place] Level (Upper Body Dressing) don;pajama/robe;minimum assist (75% patient effort)  -SD     Position (Upper Body Dressing) supported sitting  -SD       Row Name 03/14/25 1142          Bathing Assessment/Intervention    Comment, (Bathing) reviewed use of LH bath sponge  -SD       [facility] Name 03/14/25 1142          Grooming Assessment/Training    [place] Level (Grooming) wash face, hands;set up  -SD     Position (Grooming) supported sitting  -SD               User Key  (r) = Recorded By, (t) = Taken By, (c) = Cosigned By      Initials Name Provider Type    Angela King OT Occupational Therapist                   Obj/Interventions       [facility] Name 03/14/25 1145          Balance    Static Sitting Balance standby assist  -SD     Dynamic Sitting Balance supervision  -SD     Position, Sitting Balance unsupported;sitting in chair  -SD     Sit to Stand Dynamic Balance minimal assist;1-person assist;verbal cues  -SD     Static Standing Balance contact guard;verbal cues;1-person assist  -SD     Dynamic Standing Balance minimal assist;verbal cues;1-person assist  -SD     Position/Device Used, Standing Balance supported;walker, front-wheeled  -SD     Balance Interventions sitting;dynamic reaching;occupation based/functional task;UE activity with balance activity  -SD               User Key  (r) = Recorded By, (t) = Taken By, (c) = Cosigned By      Initials Name Provider Type    Angela King  LEVI Marcelo Occupational Therapist                   Goals/Plan    No documentation.                  Clinical Impression       Row Name 03/14/25 1145          Pain Assessment    Pretreatment Pain Rating 0/10 - no pain  -SD     Posttreatment Pain Rating 0/10 - no pain  -SD       Row Name 03/14/25 1145          Plan of Care Review    Plan of Care Reviewed With patient  -SD     Progress improving  -SD     Outcome Evaluation Pt. demonstrated increasing ADL & functional mobility independence, ambulating household distance with min A x 1 on RWx. Pt. completed LBD activity with SUP using sock aid. Pt. continues to present below functional baseline & continues to warrant OT skilled services to return to PLOF. Recommend IPRF upon d/c.  -SD       Row Name 03/14/25 1145          Therapy Assessment/Plan (OT)    Rehab Potential (OT) good  -SD     Criteria for Skilled Therapeutic Interventions Met (OT) yes;meets criteria;skilled treatment is necessary  -SD     Therapy Frequency (OT) daily  -SD       Row Name 03/14/25 1145          Therapy Plan Review/Discharge Plan (OT)    Anticipated Discharge Disposition (OT) inpatient rehabilitation facility  -SD       Row Name 03/14/25 1145          Vital Signs    O2 Delivery Pre Treatment room air  -SD     O2 Delivery Intra Treatment room air  -SD     O2 Delivery Post Treatment room air  -SD     Pre Patient Position Sitting  -SD     Intra Patient Position Standing  -SD     Post Patient Position Sitting  -SD       Row Name 03/14/25 1145          Positioning and Restraints    Pre-Treatment Position sitting in chair/recliner  -SD     Post Treatment Position chair  -SD     In Chair notified nsg;reclined;call light within reach;encouraged to call for assist;with nsg;legs elevated  -SD               User Key  (r) = Recorded By, (t) = Taken By, (c) = Cosigned By      Initials Name Provider Type    Angela King, OT Occupational Therapist                   Outcome Measures       Row  Name 03/14/25 1113          How much help from another is currently needed...    Putting on and taking off regular lower body clothing? 3  -SD     Bathing (including washing, rinsing, and drying) 2  -SD     Toileting (which includes using toilet bed pan or urinal) 3  -SD     Putting on and taking off regular upper body clothing 3  -SD     Taking care of personal grooming (such as brushing teeth) 3  -SD     Eating meals 4  -SD     AM-PAC 6 Clicks Score (OT) 18  -SD       Row Name 03/14/25 1113          Functional Assessment    Outcome Measure Options AM-PAC 6 Clicks Daily Activity (OT)  -SD               User Key  (r) = Recorded By, (t) = Taken By, (c) = Cosigned By      Initials Name Provider Type    Angela King, LEVI Occupational Therapist                    Occupational Therapy Education       Title: PT OT SLP Therapies (In Progress)       Topic: Occupational Therapy (Done)       Point: ADL training (Done)       Learning Progress Summary            Patient Eager, E,TB,D, VU,NR by AR at 3/12/2025 1641                      Point: Precautions (Done)       Learning Progress Summary            Patient Eager, E,TB,D, VU,NR by AR at 3/12/2025 1641                      Point: Body mechanics (Done)       Learning Progress Summary            Patient Eager, E,TB,D, VU,NR by AR at 3/12/2025 1641                                      User Key       Initials Effective Dates Name Provider Type [facility] 07/11/23 -  Gissell Galindo OT Occupational Therapist OT                  OT Recommendation and Plan  Therapy Frequency (OT): daily  Plan of Care Review  Plan of Care Reviewed With: patient  Progress: improving  Outcome Evaluation: Pt. demonstrated increasing ADL & functional mobility independence, ambulating household distance with min A x 1 on RWx. Pt. completed LBD activity with SUP using sock aid. Pt. continues to present below functional baseline & continues to warrant OT skilled services to return to  PLOF. Recommend IPRF upon d/c.     Time Calculation:         Time Calculation- OT       Row Name 03/14/25 1149             Time Calculation- OT    OT Received On 03/14/25  -SD      OT Goal Re-Cert Due Date 03/20/25  -SD         Timed Charges    59852 - OT Therapeutic Activity Minutes 13  -SD      30863 - OT Self Care/Mgmt Minutes 15  -SD         Total Minutes    Timed Charges Total Minutes 28  -SD       Total Minutes 28  -SD                User Key  (r) = Recorded By, (t) = Taken By, (c) = Cosigned By      Initials Name Provider Type    Angela King OT Occupational Therapist                  Therapy Charges for Today       Code Description Service Date Service Provider Modifiers Qty    70546699472 HC OT THERAPEUTIC ACT EA 15 MIN 3/14/2025 Angela Paul OT GO 1    28108314209 HC OT SELF CARE/MGMT/TRAIN EA 15 MIN 3/14/2025 Angela Paul OT GO 1                 Angela Paul OT  3/14/2025    Electronically signed by Angela Paul OT at 03/14/25 1149

## 2025-03-14 NOTE — CASE MANAGEMENT/SOCIAL WORK
Continued Stay Note  [facility]     Patient Name: Romel Wilson  MRN: [id number]  Today's Date: 3/14/2025    Admit Date: 3/9/2025    Plan: SNF   Discharge Plan       Row Name 03/14/25 1220       Plan    Plan SNF    Patient/Family in Agreement with Plan yes    Plan Comments As of yesterday [facility] had no female SNF beds, referral thru Roseanne @ [facility]. OT consulted to see this am for updated notes. Per MDR, medically ready, I sent to initiate insurance pre cert. [facility] liaison w/ Carmen rodrigues gave me numbers for weekend if insurance pre cert returns. Report number [phone]. Weekend fax  number [phone]. I met w/patient she is agreeable to d/c to [facility] once insurance pre cert is approved. Will await to plan transport per advice from Roseanne. CM will continue to follow.Updated son Kurt via phone call.  called for EMS DNR/DNI form.    Final Discharge Disposition Code 03 - skilled nursing facility (SNF)                   Discharge Codes    No documentation.                 Expected Discharge Date and Time       Expected Discharge Date Expected Discharge Time    Mar 14, 2025               Evan Lyle RN

## 2025-03-14 NOTE — CASE MANAGEMENT/SOCIAL WORK
Case Management Discharge Note      Final Note: ON SATURDAY 3/14/25, patient has insurance approval for SNF rehab @ [facility]. On Saturday RN to call report to [phone] and send d/c packet w/patient. W/E CM will fax dc summary to [phone]. I have arranged wheelchair transport for Saturday 3/14/25 @ 1300PM, they will come to room for pickup, thru [employer]. Has EMS DNR/DNI form on chartlet. No other d/c needs expressed.         Selected Continued Care - Admitted Since 3/9/2025       Destination Coordination complete.      Service Provider Services Address Phone Fax Patient Preferred    [facility] [address], [facility] [address] [phone] --              Durable Medical Equipment    No services have been selected for the patient.                Dialysis/Infusion    No services have been selected for the patient.                Home Medical Care    No services have been selected for the patient.                Therapy    No services have been selected for the patient.                Community Resources    No services have been selected for the patient.                Community & DME    No services have been selected for the patient.                         Final Discharge Disposition Code: 03 - skilled nursing facility (SNF)

## 2025-03-14 NOTE — PROGRESS NOTES
[facility] Medicine Services  PROGRESS NOTE    Patient Name: Romel Wilson  : 1950  MRN: [id number]    Date of Admission: 3/9/2025  Primary Care Physician: Hafsa Plummer MD    Subjective   Subjective     CC: LLE cellulitis f/u    HPI:  Doing well  Wraps were too tight overnight so were removed this morning and PT wound updated  Hopeful for rehab today      Objective   Objective     Vital Signs:   Temp:  [97.8 °F (36.6 °C)-98.5 °F (36.9 °C)] 98.4 °F (36.9 °C)  Heart Rate:  [65-70] 70  Resp:  [18] 18  BP: ()/(43-56) 98/43     Physical Exam:  Constitutional: No acute distress, awake, alert female sitting in recliner w legs propped up  Respiratory: Clear to auscultation bilaterally, respiratory effort normal   Cardiovascular: RRR, no murmurs, rubs, or gallops, no LE edema  Gastrointestinal: Soft, nontender, nondistended  Musculoskeletal: Muscle tone within normal limits, no joint effusions appreciated  Psychiatric: Appropriate affect, cooperative  Neurologic: Alert and oriented, speech clear  Lower extremity exam: mild skin thickening of left shin appreciated most c/w chronic venous dermatitis changes, no erythema, no significant edema    Results Reviewed:  LAB RESULTS:      Lab 25  0545 03/10/25  0430 25  1535 25  1254 25  1122 25  1014   WBC 9.72 11.25*  --   --   --  11.22*   HEMOGLOBIN 12.6 12.1  --   --   --  12.2   HEMATOCRIT 39.8 39.0  --   --   --  38.7   PLATELETS 275 273  --   --   --  285   NEUTROS ABS  --  7.74*  --   --   --  7.85*   IMMATURE GRANS (ABS)  --  0.05  --   --   --  0.04   LYMPHS ABS  --  2.42  --   --   --  2.25   MONOS ABS  --  0.87  --   --   --  0.93*   EOS ABS  --  0.14  --   --   --  0.13   MCV 93.2 93.5  --   --   --  92.1   CRP  --   --   --   --   --  1.30*   PROCALCITONIN  --   --   --   --   --  0.04   LACTATE  --   --  1.9  --   --  1.3   HSTROP T  --   --   --  13 17* 12         Lab 25  1706  03/12/25  0408 03/11/25  0545 03/10/25  1447 03/10/25  0430 03/09/25  1014   SODIUM  --  137 140  --  142 140   POTASSIUM 4.2 3.6 3.9 4.2 3.6 4.1   CHLORIDE  --  98 96*  --  100 101   CO2  --  30.0* 31.0*  --  30.0* 28.0   ANION GAP  --  9.0 13.0  --  12.0 11.0   BUN  --  14 12  --  10 10   CREATININE  --  0.88 1.16*  --  1.09* 1.05*   EGFR  --  68.6 49.3*  --  53.1* 55.5*   GLUCOSE  --  85 101*  --  93 98   CALCIUM  --  8.8 9.2  --  8.7 9.0   MAGNESIUM  --   --   --   --  1.9  --    TSH  --   --   --   --  4.510*  --          Lab 03/10/25  0430 03/09/25  1014   TOTAL PROTEIN 6.2 6.9   ALBUMIN 3.5 3.5   GLOBULIN 2.7 3.4   ALT (SGPT) 9 9   AST (SGOT) 16 17   BILIRUBIN 0.4 0.5   ALK PHOS 96 94         Lab 03/09/25  1254 03/09/25  1122 03/09/25  1014   PROBNP  --   --  132.9   HSTROP T 13 17* 12                 Brief Urine Lab Results  (Last result in the past 365 days)        Color   Clarity   Blood   Leuk Est   Nitrite   Protein   CREAT   Urine HCG        03/10/25 0051             16.6         03/10/25 0051 Yellow   Clear   Negative   Negative   Negative   Negative                   Microbiology Results Abnormal       None            No radiology results from the last 24 hrs    Results for orders placed during the hospital encounter of 03/09/25    Adult Transthoracic Echo Complete W/ Cont if Necessary Per Protocol    Interpretation Summary    Very poor acoustic windows.  All cardiac structures poorly visualized.    Left ventricular systolic function is normal. Calculated left ventricular EF = 66.7% Left ventricular ejection fraction appears to be 66 - 70%.    The right ventricular cavity is mildly dilated with normal systolic function by visual estimation.    Mild aortic regurgitation is present.      Current medications:  Scheduled Meds:allopurinol, 200 mg, Oral, Daily  bumetanide, 1 mg, Oral, Daily  cholecalciferol, 1,000 Units, Oral, Daily  enoxaparin sodium, 60 mg, Subcutaneous, Q12H  guaifenesin-dextromethorphan  600-30 mg, 1 tablet, Oral, Q12H  levothyroxine, 100 mcg, Oral, Q AM  senna-docusate sodium, 2 tablet, Oral, BID   And  polyethylene glycol, 17 g, Oral, Daily  revefenacin, 175 mcg, Nebulization, Daily - RT  rosuvastatin, 20 mg, Oral, Nightly  sodium chloride, 10 mL, Intravenous, Q12H      Continuous Infusions:   PRN Meds:.  acetaminophen **OR** acetaminophen **OR** acetaminophen    senna-docusate sodium **AND** polyethylene glycol **AND** bisacodyl **AND** bisacodyl    Calcium Replacement - Follow Nurse / BPA Driven Protocol    ipratropium-albuterol    Magnesium Low Dose Replacement - Follow Nurse / BPA Driven Protocol    ondansetron ODT **OR** ondansetron    Phosphorus Replacement - Follow Nurse / BPA Driven Protocol    Potassium Replacement - Follow Nurse / BPA Driven Protocol    [COMPLETED] Insert Peripheral IV **AND** sodium chloride    sodium chloride    sodium chloride    Assessment & Plan   Assessment & Plan     Active Hospital Problems    Diagnosis  POA    **Cellulitis of left lower extremity [L03.116]  Yes    Hypothyroidism (acquired) [E03.9]  Unknown    Obstructive sleep apnea, adult [G47.33]  Yes      Resolved Hospital Problems   No resolved problems to display.        Brief Hospital Course to date:  Romel Wilson is a 75 y.o. female w BMI>50, chronic lymphedema using home ACE wraps who presented with LLE redness and swelling. LLE duplex negative. Found to have LLE cellulitis      LLE cellulitis - improved  Chronic lymphedema  -s/p 5 days of rocephin w resolution of cellulitis  -PT wound while here, patient does wraps at home and declines OP PT wound f/u  -continue home bumex dosing for now, may need titration down in future. Repeat BMP within 7 days. ECHO w normal EF, defer to OP w/u    Acute on chronic debility  -prior baseline: indept for in-home activities w rollator  -requiring rehab, pending plcmt + precert    Chronic hypotension - patient reports baseline SBP 90's, asymptomatic so will not pursue  trx at this time    NANCY - home cpap  Hypothyroidism - levothyroxine  HLD - crestor  Gout- allopurinol  BMI 53    Expected Discharge Location and Transportation: rehab placemen  Expected Discharge medically ready pending plcmt/precert  Expected Discharge Date: 3/14/2025; Expected Discharge Time:      VTE Prophylaxis:  Pharmacologic & mechanical VTE prophylaxis orders are present.         AM-PAC 6 Clicks Score (PT): 13 (03/13/25 2000)    CODE STATUS:   Code Status and Medical Interventions: No CPR (Do Not Attempt to Resuscitate); Limited Support; No intubation (DNI)   Ordered at: 03/10/25 1203     Code Status (Patient has no pulse and is not breathing):    No CPR (Do Not Attempt to Resuscitate)     Medical Interventions (Patient has pulse or is breathing):    Limited Support     Medical Intervention Limits:    No intubation (DNI)     Level Of Support Discussed With:    Patient       Katherine Bynum MD  03/14/25

## 2025-03-14 NOTE — THERAPY TREATMENT NOTE
Patient Name: Romel Wilson  : 1950    MRN: [id number]                              Today's Date: 3/14/2025       Admit Date: 3/9/2025    Visit Dx:     ICD-10-CM ICD-9-CM   1. Pain of left lower extremity  M79.605 729.5   2. Cellulitis of left lower extremity  L03.116 682.6     Patient Active Problem List   Diagnosis    Obstructive sleep apnea, adult    Morbid obesity due to excess calories    Cellulitis of left lower extremity    Hypothyroidism (acquired)     Past Medical History:   Diagnosis Date    Arthritis     Disease of thyroid gland     Gout      Past Surgical History:   Procedure Laterality Date    HYSTERECTOMY      THYROIDECTOMY        General Information       Row Name 25 1113          OT Time and Intention    Document Type therapy note (daily note)  -SD     Mode of Treatment occupational therapy  -SD     Patient Effort good  -SD     Symptoms Noted During/After Treatment increased pain  -SD       Row Name 25 1113          General Information    Patient Profile Reviewed yes  -SD     Existing Precautions/Restrictions fall  -SD     Barriers to Rehab medically complex;previous functional deficit  -SD       Row Name 25 1113          Cognition    Orientation Status (Cognition) oriented x 4  -SD       Row Name 25 1113          Safety Issues/Impairments Affecting Functional Mobility    Safety Issues Affecting Function (Mobility) insight into deficits/self-awareness;safety precaution awareness;safety precautions follow-through/compliance;sequencing abilities  -SD     Impairments Affecting Function (Mobility) balance;endurance/activity tolerance;shortness of breath;strength;pain;postural/trunk control  -SD               User Key  (r) = Recorded By, (t) = Taken By, (c) = Cosigned By      Initials Name Provider Type    Angela King, OT Occupational Therapist                   Lymphedema       Row Name 25 1410             Lymphedema Edema Assessment    Ptting Edema  Category By severity  -      Pitting Edema Mild  -MF      Recorded by [MF] Onesimo Torres, PT              Skin Changes/Observations    Location/Assessment Lower Extremity  -      Lower Extremity Conditions scaly;crust;left:;right:;intact;clean;dry  -MF      Recorded by [MF] Onesimo Torres, PT              Compression/Skin Care    Compression/Skin Care skin care;wrapping location;bandaging  -MF      Skin Care washed/dried;lotion applied;moisturizing lotion applied  -MF      Wrapping Location lower extremity  -MF      Wrapping Location LE bilateral:;foot to knee  -      Wrapping Comments size 5/6 compressogrip doubled and overlapping for gradient compression.  -MF      Recorded by [MF] Onesimo Torres, PT                User Key  (r) = Recorded By, (t) = Taken By, (c) = Cosigned By      Initials Name Effective Dates    Onesimo Coleman, PT 02/03/23 -                    Mobility/ADL's       Row Name 03/14/25 1142          Bed Mobility    Comment, (Bed Mobility) pt. up in chair  -SD       Row Name 03/14/25 1142          Transfers    Transfers sit-stand transfer;stand-sit transfer;toilet transfer  -SD       Row Name 03/14/25 1142          Sit-Stand Transfer    Sit-Stand [place] (Transfers) contact guard;1 person assist;verbal cues  -SD     Assistive Device (Sit-Stand Transfers) walker, front-wheeled  -SD       Row Name 03/14/25 1142          Stand-Sit Transfer    Stand-Sit [place] (Transfers) contact guard;1 person assist;verbal cues  -SD     Assistive Device (Stand-Sit Transfers) walker, front-wheeled  -SD       Row Name 03/14/25 1142          Toilet Transfer    Type (Toilet Transfer) sit-stand;stand-sit  -SD     [place] Level (Toilet Transfer) minimum assist (75% patient effort);1 person assist;verbal cues  -SD     Assistive Device (Toilet Transfer) commode, bedside without drop arms  -SD       Row Name 03/14/25 1142          Functional Mobility    Functional Mobility- Ind. Level 1  person;verbal cues required;minimum assist (75% patient effort)  -SD     Functional Mobility- Device walker, front-wheeled  -SD     Functional Mobility-Distance (Feet) 50  -SD     Functional Mobility- Safety Issues step length decreased;balance decreased during turns  -SD     Patient was able to Ambulate yes  -SD       Row Name 03/14/25 1142          Activities of Daily Living    BADL Assessment/Intervention lower body dressing;upper body dressing;grooming  -SD       Row Name 03/14/25 1142          Lower Body Dressing Assessment/Training    [place] Level (Lower Body Dressing) don;socks;supervision;verbal cues  -SD     Assistive Devices (Lower Body Dressing) reacher;sock-aid  -SD     Position (Lower Body Dressing) supported sitting  -SD       Row Name 03/14/25 1142          Upper Body Dressing Assessment/Training    [place] Level (Upper Body Dressing) don;pajama/robe;minimum assist (75% patient effort)  -SD     Position (Upper Body Dressing) supported sitting  -SD       Row Name 03/14/25 1142          Bathing Assessment/Intervention    Comment, (Bathing) reviewed use of LH bath sponge  -SD       Row Name 03/14/25 1142          Grooming Assessment/Training    [place] Level (Grooming) wash face, hands;set up  -SD     Position (Grooming) supported sitting  -SD               User Key  (r) = Recorded By, (t) = Taken By, (c) = Cosigned By      Initials Name Provider Type    Angela King OT Occupational Therapist                   Obj/Interventions       Row Name 03/14/25 1145          Balance    Static Sitting Balance standby assist  -SD     Dynamic Sitting Balance supervision  -SD     Position, Sitting Balance unsupported;sitting in chair  -SD     Sit to Stand Dynamic Balance minimal assist;1-person assist;verbal cues  -SD     Static Standing Balance contact guard;verbal cues;1-person assist  -SD     Dynamic Standing Balance minimal assist;verbal cues;1-person assist  -SD     Position/Device  Used, Standing Balance supported;walker, front-wheeled  -SD     Balance Interventions sitting;dynamic reaching;occupation based/functional task;UE activity with balance activity  -SD               User Key  (r) = Recorded By, (t) = Taken By, (c) = Cosigned By      Initials Name Provider Type    Angela King, LEVI Occupational Therapist                   Goals/Plan    No documentation.                  Clinical Impression       Row Name 03/14/25 1145          Pain Assessment    Pretreatment Pain Rating 0/10 - no pain  -SD     Posttreatment Pain Rating 0/10 - no pain  -SD       Row Name 03/14/25 1145          Plan of Care Review    Plan of Care Reviewed With patient  -SD     Progress improving  -SD     Outcome Evaluation Pt. demonstrated increasing ADL & functional mobility independence, ambulating household distance with min A x 1 on RWx. Pt. completed LBD activity with SUP using sock aid. Pt. continues to present below functional baseline & continues to warrant OT skilled services to return to PLOF. Recommend IPRF upon d/c.  -SD       Row Name 03/14/25 1145          Therapy Assessment/Plan (OT)    Rehab Potential (OT) good  -SD     Criteria for Skilled Therapeutic Interventions Met (OT) yes;meets criteria;skilled treatment is necessary  -SD     Therapy Frequency (OT) daily  -SD       Row Name 03/14/25 1145          Therapy Plan Review/Discharge Plan (OT)    Anticipated Discharge Disposition (OT) inpatient rehabilitation facility  -SD       Row Name 03/14/25 1145          Vital Signs    O2 Delivery Pre Treatment room air  -SD     O2 Delivery Intra Treatment room air  -SD     O2 Delivery Post Treatment room air  -SD     Pre Patient Position Sitting  -SD     Intra Patient Position Standing  -SD     Post Patient Position Sitting  -SD       Row Name 03/14/25 1145          Positioning and Restraints    Pre-Treatment Position sitting in chair/recliner  -SD     Post Treatment Position chair  -SD     In Chair  notified nsg;reclined;call light within reach;encouraged to call for assist;with nsg;legs elevated  -SD               User Key  (r) = Recorded By, (t) = Taken By, (c) = Cosigned By      Initials Name Provider Type    Angela King OT Occupational Therapist                   Outcome Measures       Row Name 03/14/25 1113          How much help from another is currently needed...    Putting on and taking off regular lower body clothing? 3  -SD     Bathing (including washing, rinsing, and drying) 2  -SD     Toileting (which includes using toilet bed pan or urinal) 3  -SD     Putting on and taking off regular upper body clothing 3  -SD     Taking care of personal grooming (such as brushing teeth) 3  -SD     Eating meals 4  -SD     AM-PAC 6 Clicks Score (OT) 18  -SD       Row Name 03/14/25 1113          Functional Assessment    Outcome Measure Options AM-PAC 6 Clicks Daily Activity (OT)  -SD               User Key  (r) = Recorded By, (t) = Taken By, (c) = Cosigned By      Initials Name Provider Type    Angela King OT Occupational Therapist                    Occupational Therapy Education       Title: PT OT SLP Therapies (In Progress)       Topic: Occupational Therapy (Done)       Point: ADL training (Done)       Learning Progress Summary            Patient Eager, E,TB,D, VU,NR by AR at 3/12/2025 1641                      Point: Precautions (Done)       Learning Progress Summary            Patient Eager, E,TB,D, VU,NR by AR at 3/12/2025 1641                      Point: Body mechanics (Done)       Learning Progress Summary            Patient Eager, E,TB,D, VU,NR by AR at 3/12/2025 1641                                      User Key       Initials Effective Dates Name Provider Type Discipline    AR 07/11/23 -  Gissell Galindo OT Occupational Therapist OT                  OT Recommendation and Plan  Therapy Frequency (OT): daily  Plan of Care Review  Plan of Care Reviewed With:  patient  Progress: improving  Outcome Evaluation: Pt. demonstrated increasing ADL & functional mobility independence, ambulating household distance with min A x 1 on RWx. Pt. completed LBD activity with SUP using sock aid. Pt. continues to present below functional baseline & continues to warrant OT skilled services to return to PLOF. Recommend IPRF upon d/c.     Time Calculation:         Time Calculation- OT       Row Name 03/14/25 1149             Time Calculation- OT    OT Received On 03/14/25  -SD      OT Goal Re-Cert Due Date 03/20/25  -SD         Timed Charges    23506 - OT Therapeutic Activity Minutes 13  -SD      58938 - OT Self Care/Mgmt Minutes 15  -SD         Total Minutes    Timed Charges Total Minutes 28  -SD       Total Minutes 28  -SD                User Key  (r) = Recorded By, (t) = Taken By, (c) = Cosigned By      Initials Name Provider Type    Angela King OT Occupational Therapist                  Therapy Charges for Today       Code Description Service Date Service Provider Modifiers Qty    44702700611 HC OT THERAPEUTIC ACT EA 15 MIN 3/14/2025 Angela Paul OT GO 1    92102417319 HC OT SELF CARE/MGMT/TRAIN EA 15 MIN 3/14/2025 Angela Paul OT GO 1                 Angela Paul OT  3/14/2025

## 2025-03-15 VITALS
BODY MASS INDEX: 48.82 KG/M2 | TEMPERATURE: 97.5 F | DIASTOLIC BLOOD PRESSURE: 63 MMHG | SYSTOLIC BLOOD PRESSURE: 119 MMHG | HEART RATE: 59 BPM | WEIGHT: 293 LBS | RESPIRATION RATE: 18 BRPM | HEIGHT: 65 IN | OXYGEN SATURATION: 94 %

## 2025-03-15 PROBLEM — I95.89 CHRONIC HYPOTENSION: Status: ACTIVE | Noted: 2025-03-15

## 2025-03-15 PROBLEM — L03.116 CELLULITIS OF LEFT LOWER EXTREMITY: Status: RESOLVED | Noted: 2025-03-09 | Resolved: 2025-03-15

## 2025-03-15 PROBLEM — I89.0 LYMPHEDEMA: Status: ACTIVE | Noted: 2025-03-15

## 2025-03-15 LAB
DEPRECATED RDW RBC AUTO: 53.4 FL (ref 37–54)
ERYTHROCYTE [DISTWIDTH] IN BLOOD BY AUTOMATED COUNT: 15.9 % (ref 12.3–15.4)
HCT VFR BLD AUTO: 35.4 % (ref 34–46.6)
HGB BLD-MCNC: 11.4 G/DL (ref 12–15.9)
MCH RBC QN AUTO: 29.2 PG (ref 26.6–33)
MCHC RBC AUTO-ENTMCNC: 32.2 G/DL (ref 31.5–35.7)
MCV RBC AUTO: 90.5 FL (ref 79–97)
PLATELET # BLD AUTO: 261 10*3/MM3 (ref 140–450)
PMV BLD AUTO: 9.5 FL (ref 6–12)
RBC # BLD AUTO: 3.91 10*6/MM3 (ref 3.77–5.28)
WBC NRBC COR # BLD AUTO: 9.01 10*3/MM3 (ref 3.4–10.8)

## 2025-03-15 PROCEDURE — 85027 COMPLETE CBC AUTOMATED: CPT

## 2025-03-15 PROCEDURE — 97535 SELF CARE MNGMENT TRAINING: CPT

## 2025-03-15 PROCEDURE — 94799 UNLISTED PULMONARY SVC/PX: CPT

## 2025-03-15 PROCEDURE — 94664 DEMO&/EVAL PT USE INHALER: CPT

## 2025-03-15 PROCEDURE — 99239 HOSP IP/OBS DSCHRG MGMT >30: CPT | Performed by: INTERNAL MEDICINE

## 2025-03-15 PROCEDURE — 63710000001 REVEFENACIN 175 MCG/3ML SOLUTION: Performed by: STUDENT IN AN ORGANIZED HEALTH CARE EDUCATION/TRAINING PROGRAM

## 2025-03-15 PROCEDURE — 25010000002 ENOXAPARIN PER 10 MG: Performed by: HOSPITALIST

## 2025-03-15 RX ORDER — BUMETANIDE 1 MG/1
1 TABLET ORAL DAILY PRN
Start: 2025-03-15

## 2025-03-15 RX ORDER — BUMETANIDE 1 MG/1
1 TABLET ORAL DAILY
Start: 2025-03-15

## 2025-03-15 RX ADMIN — LEVOTHYROXINE SODIUM 100 MCG: 0.1 TABLET ORAL at 06:52

## 2025-03-15 RX ADMIN — GUAIFENESIN AND DEXTROMETHORPHAN HYDROBROMIDE 1 TABLET: 600; 30 TABLET, EXTENDED RELEASE ORAL at 08:10

## 2025-03-15 RX ADMIN — REVEFENACIN 175 MCG: 175 SOLUTION RESPIRATORY (INHALATION) at 09:24

## 2025-03-15 RX ADMIN — ENOXAPARIN SODIUM 60 MG: 60 INJECTION SUBCUTANEOUS at 06:52

## 2025-03-15 RX ADMIN — ALLOPURINOL 200 MG: 100 TABLET ORAL at 08:10

## 2025-03-15 RX ADMIN — SENNOSIDES AND DOCUSATE SODIUM 2 TABLET: 50; 8.6 TABLET ORAL at 08:10

## 2025-03-15 RX ADMIN — POLYETHYLENE GLYCOL 3350 17 G: 17 POWDER, FOR SOLUTION ORAL at 08:10

## 2025-03-15 RX ADMIN — BUMETANIDE 1 MG: 1 TABLET ORAL at 08:10

## 2025-03-15 NOTE — PLAN OF CARE
Goal Outcome Evaluation:  Plan of Care Reviewed With: patient        Progress: improving            Problem: Adult Inpatient Plan of Care  Goal: Absence of Hospital-Acquired Illness or Injury  Intervention: Identify and Manage Fall Risk  Description: Perform standard risk assessment on admission using a validated tool or comprehensive approach appropriate to the patient; reassess fall risk frequently, with change in status or transfer to another level of care.Communicate risk to interprofessional healthcare team; ensure fall risk visible cue.Determine need for increased observation, equipment and environmental modification, as well as use of supportive, nonskid footwear.Adjust safety measures to individual needs and identified risk factors.Reinforce the importance of active participation with fall risk prevention, safety, and physical activity with the patient and family.Perform regular intentional rounding to assess need for position change, pain assessment and personal needs, including assistance with toileting.  Recent Flowsheet Documentation  Taken 3/14/2025 2000 by Pawan Biswas RN  Safety Promotion/Fall Prevention: activity supervised  Intervention: Prevent Skin Injury  Description: Perform a screening for skin injury risk, such as pressure or moisture-associated skin damage on admission and at regular intervals throughout hospital stay.Keep all areas of skin (especially folds) clean and dry.Maintain adequate skin hydration.Relieve and redistribute pressure and protect bony prominences and skin at risk for injury; implement measures based on patient-specific risk factors.Match turning and repositioning schedule to clinical condition.Encourage weight shift frequently; assist with reposition if unable to complete independently.Float heels off bed; avoid pressure on the Achilles tendon.Keep skin free from extended contact with medical devices.Optimize nutrition and hydration.Encourage functional activity and  mobility, as early as tolerated.Use aids (e.g., slide boards, mechanical lift) during transfer.  Recent Flowsheet Documentation  Taken 3/15/2025 0000 by Pawan Biswas RN  Body Position:   position changed independently   side-lying   right  Taken 3/14/2025 2000 by Pawan Biswas RN  Body Position: position changed independently  Skin Protection: silicone foam dressing in place  Intervention: Prevent Infection  Description: Maintain skin and mucous membrane integrity; promote hand, oral and pulmonary hygiene.Optimize fluid balance, nutrition, sleep and glycemic control to maximize infection resistance.Identify potential sources of infection early to prevent or mitigate progression of infection (e.g., wound, lines, devices).Evaluate ongoing need for invasive devices; remove promptly when no longer indicated.Review vaccination status.  Recent Flowsheet Documentation  Taken 3/14/2025 2000 by Pawan Biswas RN  Infection Prevention: environmental surveillance performed     Problem: Adult Inpatient Plan of Care  Goal: Absence of Hospital-Acquired Illness or Injury  Intervention: Prevent Skin Injury  Description: Perform a screening for skin injury risk, such as pressure or moisture-associated skin damage on admission and at regular intervals throughout hospital stay.Keep all areas of skin (especially folds) clean and dry.Maintain adequate skin hydration.Relieve and redistribute pressure and protect bony prominences and skin at risk for injury; implement measures based on patient-specific risk factors.Match turning and repositioning schedule to clinical condition.Encourage weight shift frequently; assist with reposition if unable to complete independently.Float heels off bed; avoid pressure on the Achilles tendon.Keep skin free from extended contact with medical devices.Optimize nutrition and hydration.Encourage functional activity and mobility, as early as tolerated.Use aids (e.g., slide boards, mechanical  lift) during transfer.  Recent Flowsheet Documentation  Taken 3/15/2025 0000 by Pawan Biswas RN  Body Position:   position changed independently   side-lying   right  Taken 3/14/2025 2000 by Pawan Biswas RN  Body Position: position changed independently  Skin Protection: silicone foam dressing in place     Problem: Adult Inpatient Plan of Care  Goal: Absence of Hospital-Acquired Illness or Injury  Intervention: Prevent Infection  Description: Maintain skin and mucous membrane integrity; promote hand, oral and pulmonary hygiene.Optimize fluid balance, nutrition, sleep and glycemic control to maximize infection resistance.Identify potential sources of infection early to prevent or mitigate progression of infection (e.g., wound, lines, devices).Evaluate ongoing need for invasive devices; remove promptly when no longer indicated.Review vaccination status.  Recent Flowsheet Documentation  Taken 3/14/2025 2000 by Pawan Biswas RN  Infection Prevention: environmental surveillance performed     Problem: Skin Injury Risk Increased  Goal: Skin Health and Integrity  Intervention: Optimize Skin Protection  Description: Perform a full pressure injury risk assessment, as indicated by screening, upon admission to care unit.Reassess skin (full inspection and injury risk, including skin temperature, consistency and color) frequently (e.g., scheduled interval, with change in condition) to provide optimal early detection and prevention.Maintain adequate tissue perfusion (e.g., encourage fluid balance; avoid crossing legs, constrictive clothing or devices) to promote tissue oxygenation.Maintain head of bed at lowest degree of elevation tolerated, considering medical condition and other restrictions. Use positioning supports to prevent sliding and friction. Consider low friction textiles.Avoid positioning onto an area that remains reddened or on bony prominences.Minimize incontinence and moisture (e.g., toileting  schedule; moisture-wicking pad, diaper or incontinence collection device; skin moisture barrier).Cleanse skin promptly and gently, when soiled, utilizing a pH-balanced cleanser.Relieve and redistribute pressure (e.g., scheduled position changes, weight shifts, use of support surface, medical device repositioning, protective dressing application, use of positioning device, microclimate control, use of pressure-injury-monitorEncourage increased activity, such as sitting in a chair at the bedside or early mobilization, when able to tolerate. Avoid prolonged sitting.  Recent Flowsheet Documentation  Taken 3/15/2025 0000 by Pawan Biswas, RN  Head of Bed (HOB) Positioning: HOB elevated  Taken 3/14/2025 2000 by Pawan Biswas RN  Activity Management: up in chair  Pressure Reduction Techniques: heels elevated off bed  Pressure Reduction Devices: chair cushion utilized  Skin Protection: silicone foam dressing in place

## 2025-03-15 NOTE — DISCHARGE PLACEMENT REQUEST
"Romel Morales (75 y.o. Female) From Evan Lyle RN  [id number]      Date of Birth   1950    Social Security Number       Address   [address]   [place] [id number]    Home Phone   [phone]    MRN   [id number]       [facility]    Marital Status   Single                            Admission Date   3/9/2025    Admission Type   Emergency    Admitting Provider   Katherine Bynum MD    Attending Provider   Katherine Bynum MD    Department, Room/Bed   [address], S584/1       Discharge Date       Discharge Disposition   Skilled Nursing Facility (DC - External)    Discharge Destination                                 Attending Provider: Katherine Bynum MD    Allergies: No Known Allergies    Isolation: None   Infection: None   Code Status: No CPR    Ht: 165.1 cm (65\")   Wt: 146 kg (321 lb 14 oz)    Admission Cmt: None   Principal Problem: Cellulitis of left lower extremity [L03.116]                   Active Insurance as of 3/9/2025       Primary Coverage       Payor Plan Insurance Group Employer/Plan Group    HUMANA MEDICARE REPLACEMENT HUMANA MEDICARE REPLACEMENT [id number]       Payor Plan Address Payor Plan Phone Number Payor Plan Fax Number Effective Dates    [address] [address] [phone]  2018 - None Entered    Former[facility] [id number]         Subscriber Name Subscriber Birth Date Member ID       ROMEL MORALES 1950 [id number]                     Emergency Contacts        (Rel.) Home Phone Work Phone Mobile Phone    Kurt Alberto (Son) [phone] -- [phone]    Dayna Lobo (Friend) -- -- [phone]                   Discharge Summary        Katherine Bynum MD at 03/15/25 0906              [facility] Medicine Services  DISCHARGE SUMMARY    Patient Name: Romel Morales  : 1950  MRN: [id number]    Date of Admission: 3/9/2025  9:20 AM  Date of Discharge:  3/15/2025  Primary Care Physician: Hafsa Plummer, " MD    Consults       No orders found from 2/8/2025 to 3/10/2025.            Hospital Course     Presenting Problem: lymphedema c/b cellulitis    Active Hospital Problems    Diagnosis  POA    Lymphedema [I89.0]  Yes    Chronic hypotension [I95.89]  Yes    Hypothyroidism (acquired) [E03.9]  Yes    Obstructive sleep apnea, adult [G47.33]  Yes    Morbid obesity due to excess calories [E66.01]  Yes      Resolved Hospital Problems    Diagnosis Date Resolved POA    **Cellulitis of left lower extremity [L03.116] 03/15/2025 Yes          Hospital Course:  Romel Wilson is a 75 y.o. female w BMI>50, chronic tobacco use, chronic lymphedema using home ACE wraps who presented with LLE redness and swelling. LLE duplex negative. Found to have LLE cellulitis     LLE cellulitis improved quickly w 5 days of rocephin. Lymphedema improved w PT lymhedema wrapping and bumex.    Will need continued lymphedema care at facility. Will discharge at 317 lbs with bumex 1 mg daily and additional PRN dose for weight gain. Recommend daily weights there. Repeat BMP within 7 days at facility. Has some lower BP's (SBP 90's) which is chronic and asx.    Discharged to SNF 3/15    Discharge Follow Up Recommendations for outpatient labs/diagnostics:   BMP within 7 days at facility to follow-up bumex/K+ dosing   PCP 1 week from rehab dispo    Day of Discharge     HPI:   Doing wellt nehemiah  Baseline smokers cough  Feels this is her dry weight      Vital Signs:   Temp:  [97.4 °F (36.3 °C)-98.1 °F (36.7 °C)] 97.5 °F (36.4 °C)  Heart Rate:  [59-78] 59  Resp:  [18] 18  BP: ()/(58-72) 119/63      Physical Exam:  Constitutional: No acute distress, awake, alert female sitting in recliner w legs propped up  Respiratory: Clear to auscultation bilaterally, respiratory effort normal   Cardiovascular: RRR, no murmurs, rubs, or gallops, no pitting of bilateral LE, no LE redness  Gastrointestinal: Soft, nontender, nondistended  Musculoskeletal: Muscle tone within  normal limits, no joint effusions appreciated  Psychiatric: Appropriate affect, cooperative  Neurologic: Alert and oriented, speech clear    Pertinent  and/or Most Recent Results     LAB RESULTS:      Lab 03/15/25  0459 03/11/25  0545 03/10/25  0430 03/09/25  1535 03/09/25  1014   WBC 9.01 9.72 11.25*  --  11.22*   HEMOGLOBIN 11.4* 12.6 12.1  --  12.2   HEMATOCRIT 35.4 39.8 39.0  --  38.7   PLATELETS 261 275 273  --  285   NEUTROS ABS  --   --  7.74*  --  7.85*   IMMATURE GRANS (ABS)  --   --  0.05  --  0.04   LYMPHS ABS  --   --  2.42  --  2.25   MONOS ABS  --   --  0.87  --  0.93*   EOS ABS  --   --  0.14  --  0.13   MCV 90.5 93.2 93.5  --  92.1   CRP  --   --   --   --  1.30*   PROCALCITONIN  --   --   --   --  0.04   LACTATE  --   --   --  1.9 1.3         Lab 03/12/25  1706 03/12/25  0408 03/11/25  0545 03/10/25  1447 03/10/25  0430 03/09/25  1014   SODIUM  --  137 140  --  142 140   POTASSIUM 4.2 3.6 3.9 4.2 3.6 4.1   CHLORIDE  --  98 96*  --  100 101   CO2  --  30.0* 31.0*  --  30.0* 28.0   ANION GAP  --  9.0 13.0  --  12.0 11.0   BUN  --  14 12  --  10 10   CREATININE  --  0.88 1.16*  --  1.09* 1.05*   EGFR  --  68.6 49.3*  --  53.1* 55.5*   GLUCOSE  --  85 101*  --  93 98   CALCIUM  --  8.8 9.2  --  8.7 9.0   MAGNESIUM  --   --   --   --  1.9  --    TSH  --   --   --   --  4.510*  --          Lab 03/10/25  0430 03/09/25  1014   TOTAL PROTEIN 6.2 6.9   ALBUMIN 3.5 3.5   GLOBULIN 2.7 3.4   ALT (SGPT) 9 9   AST (SGOT) 16 17   BILIRUBIN 0.4 0.5   ALK PHOS 96 94         Lab 03/09/25  1254 03/09/25  1122 03/09/25  1014   PROBNP  --   --  132.9   HSTROP T 13 17* 12                 Brief Urine Lab Results  (Last result in the past 365 days)        Color   Clarity   Blood   Leuk Est   Nitrite   Protein   CREAT   Urine HCG        03/10/25 0051             16.6         03/10/25 0051 Yellow   Clear   Negative   Negative   Negative   Negative                 Microbiology Results (last 10 days)       Procedure Component  Value - Date/Time    MRSA Screen, PCR (Inpatient) - Swab, Nares [[id number]]  (Normal) Collected: 03/10/25 0052    Lab Status: Final result Specimen: Swab from Nares Updated: 03/10/25 0753     MRSA PCR Negative    Narrative:      The negative predictive value of this diagnostic test is high and should only be used to consider de-escalating anti-MRSA therapy. A positive result may indicate colonization with MRSA and must be correlated clinically.  MRSA Negative    Blood Culture - Blood, Arm, Right [[id number]]  (Normal) Collected: 03/09/25 1535    Lab Status: Final result Specimen: Blood from Arm, Right Updated: 03/14/25 1700     Blood Culture No growth at 5 days    Narrative:      Less than seven (7) mL's of blood was collected.  Insufficient quantity may yield false negative results.    Blood Culture - Blood, Arm, Left [[id number]]  (Normal) Collected: 03/09/25 1535    Lab Status: Final result Specimen: Blood from Arm, Left Updated: 03/14/25 1700     Blood Culture No growth at 5 days    Narrative:      Less than seven (7) mL's of blood was collected.  Insufficient quantity may yield false negative results.            Duplex Venous Lower Extremity - Left  Result Date: 3/9/2025    No evidence of deep or superficial venous thrombus in the left lower extremity.  The calf veins were difficult to visualize but no apparent thrombus.     XR Chest 1 View  Result Date: 3/9/2025  XR CHEST 1 VW Date of Exam: 3/9/2025 10:30 AM EDT Indication: cough Comparison: Low-dose chest CT [address] 23 Findings: Examination limited by body habitus. Cardiomediastinal silhouette is unremarkable. There is been a sternotomy. There is generalized interstitial prominence, similar to previous CT given differences in modality. No airspace disease, pneumothorax, nor pleural effusion. No acute osseous abnormality identified.     Impression: 1.No acute process identified Electronically Signed: Dwayne Shaw MD  3/9/2025 10:38 AM EDT  Workstation ID:  [id number]      Results for orders placed during the hospital encounter of 03/09/25    Duplex Venous Lower Extremity - Left    Interpretation Summary    No evidence of deep or superficial venous thrombus in the left lower extremity.  The calf veins were difficult to visualize but no apparent thrombus.      Results for orders placed during the hospital encounter of 03/09/25    Duplex Venous Lower Extremity - Left    Interpretation Summary    No evidence of deep or superficial venous thrombus in the left lower extremity.  The calf veins were difficult to visualize but no apparent thrombus.      Results for orders placed during the hospital encounter of 03/09/25    Adult Transthoracic Echo Complete W/ Cont if Necessary Per Protocol    Interpretation Summary    Very poor acoustic windows.  All cardiac structures poorly visualized.    Left ventricular systolic function is normal. Calculated left ventricular EF = 66.7% Left ventricular ejection fraction appears to be 66 - 70%.    The right ventricular cavity is mildly dilated with normal systolic function by visual estimation.    Mild aortic regurgitation is present.      Plan for Follow-up of Pending Labs/Results:     Discharge Details        Discharge Medications        Changes to Medications        Instructions Start Date   bumetanide 1 MG tablet  Commonly known as: BUMEX  What changed:   how much to take  how to take this  when to take this  additional instructions   1 mg, Oral, Daily      bumetanide 1 MG tablet  Commonly known as: BUMEX  What changed: You were already taking a medication with the same name, and this prescription was added. Make sure you understand how and when to take each.   1 mg, Oral, Daily PRN             Continue These Medications        Instructions Start Date   albuterol sulfate  (90 Base) MCG/ACT inhaler  Commonly known as: PROVENTIL HFA;VENTOLIN HFA;PROAIR HFA   2 puffs, Every 4 Hours PRN      allopurinol 100 MG tablet  Commonly known  as: ZYLOPRIM   200 mg, Oral, Daily      cholecalciferol 25 MCG (1000 UT) tablet  Commonly known as: VITAMIN D3   1,000 Units, Daily      hydrOXYzine pamoate 25 MG capsule  Commonly known as: VISTARIL   TAKE 1 TO 2 CAPSULES NIGHTLY AS NEEDED      levocetirizine 5 MG tablet  Commonly known as: XYZAL   5 mg, Oral, Every Evening      levothyroxine 100 MCG tablet  Commonly known as: SYNTHROID, LEVOTHROID   100 mcg, Oral, Daily      magnesium oxide 400 MG tablet  Commonly known as: MAG-OX   400 mg, Oral, Daily      potassium chloride ER 20 MEQ tablet controlled-release ER tablet  Commonly known as: K-TAB   20 mEq, Oral, Daily      rosuvastatin 20 MG tablet  Commonly known as: CRESTOR   20 mg, Oral, Daily      Spiriva Respimat 2.5 MCG/ACT aerosol solution inhaler  Generic drug: tiotropium bromide monohydrate   2 puffs, Inhalation, Daily - RT      vitamin B-12 100 MCG tablet  Commonly known as: CYANOCOBALAMIN   100 mcg, Oral, 2 Times Weekly      vitamin C 250 MG tablet  Commonly known as: ASCORBIC ACID   250 mg, Daily               No Known Allergies      Discharge Disposition:  Skilled Nursing Facility (DC - External)    Diet:  Hospital:  Diet Order   Procedures    Diet: Cardiac; Healthy Heart (2-3 Na+); Fluid Consistency: Thin (IDDSI 0)       Diet Instructions       Diet: Cardiac Diets; Low Sodium (2g); Regular (IDDSI 7); Thin (IDDSI 0)      Discharge Diet: Cardiac Diets    Cardiac Diet: Low Sodium (2g)    Texture: Regular (IDDSI 7)    Fluid Consistency: Thin (IDDSI 0)             Activity:      Restrictions or Other Recommendations:         CODE STATUS:    Code Status and Medical Interventions: No CPR (Do Not Attempt to Resuscitate); Limited Support; No intubation (DNI)   Ordered at: 03/10/25 1203     Code Status (Patient has no pulse and is not breathing):    No CPR (Do Not Attempt to Resuscitate)     Medical Interventions (Patient has pulse or is breathing):    Limited Support     Medical Intervention Limits:    No  intubation (DNI)     Level Of Support Discussed With:    Patient       Future Appointments   Date Time Provider Department Center   4/18/2025  9:00 AM ALEX [facility] ECH/VAS CRT8 BH ALEX  ALEX   4/22/2025  9:30 AM Monisha Mandel APRN [facility] CTS ALEX ALEX   2/12/2026  9:15 AM Ethel Denise APRN [facility] ALEX                 Katherine yBnum MD  03/15/25      Time Spent on Discharge:  I spent  40  minutes on this discharge activity which included: face-to-face encounter with the patient, reviewing the data in the system, coordination of the care with the nursing staff as well as consultants, documentation, and entering orders.            Electronically signed by Katherine Bynum MD at 03/15/25 0943

## 2025-03-15 NOTE — DISCHARGE SUMMARY
[facility] Medicine Services  DISCHARGE SUMMARY    Patient Name: Romel Wilson  : 1950  MRN: [id number]    Date of Admission: 3/9/2025  9:20 AM  Date of Discharge:  3/15/2025  Primary Care Physician: Hafsa Plummer MD    Consults       No orders found from 2025 to 3/10/2025.            Hospital Course     Presenting Problem: lymphedema c/b cellulitis    Active Hospital Problems    Diagnosis  POA    Lymphedema [I89.0]  Yes    Chronic hypotension [I95.89]  Yes    Hypothyroidism (acquired) [E03.9]  Yes    Obstructive sleep apnea, adult [G47.33]  Yes    Morbid obesity due to excess calories [E66.01]  Yes      Resolved Hospital Problems    Diagnosis Date Resolved POA    **Cellulitis of left lower extremity [L03.116] 03/15/2025 Yes          Hospital Course:  Romel Wilson is a 75 y.o. female w BMI>50, chronic tobacco use, chronic lymphedema using home ACE wraps who presented with LLE redness and swelling. LLE duplex negative. Found to have LLE cellulitis     LLE cellulitis improved quickly w 5 days of rocephin. Lymphedema improved w PT lymhedema wrapping and bumex.    Will need continued lymphedema care at facility. Will discharge at 317 lbs with bumex 1 mg daily and additional PRN dose for weight gain. Recommend daily weights there. Repeat BMP within 7 days at facility. Has some lower BP's (SBP 90's) which is chronic and asx.    Discharged to SNF 3/15    Discharge Follow Up Recommendations for outpatient labs/diagnostics:   BMP within 7 days at facility to follow-up bumex/K+ dosing   PCP 1 week from rehab dispo    Day of Discharge     HPI:   Doing wellt nehemiah  Baseline smokers cough  Feels this is her dry weight      Vital Signs:   Temp:  [97.4 °F (36.3 °C)-98.1 °F (36.7 °C)] 97.5 °F (36.4 °C)  Heart Rate:  [59-78] 59  Resp:  [18] 18  BP: ()/(58-72) 119/63      Physical Exam:  Constitutional: No acute distress, awake, alert female sitting in recliner w legs propped  up  Respiratory: Clear to auscultation bilaterally, respiratory effort normal   Cardiovascular: RRR, no murmurs, rubs, or gallops, no pitting of bilateral LE, no LE redness  Gastrointestinal: Soft, nontender, nondistended  Musculoskeletal: Muscle tone within normal limits, no joint effusions appreciated  Psychiatric: Appropriate affect, cooperative  Neurologic: Alert and oriented, speech clear    Pertinent  and/or Most Recent Results     LAB RESULTS:      Lab 03/15/25  0459 03/11/25  0545 03/10/25  0430 03/09/25  1535 03/09/25  1014   WBC 9.01 9.72 11.25*  --  11.22*   HEMOGLOBIN 11.4* 12.6 12.1  --  12.2   HEMATOCRIT 35.4 39.8 39.0  --  38.7   PLATELETS 261 275 273  --  285   NEUTROS ABS  --   --  7.74*  --  7.85*   IMMATURE GRANS (ABS)  --   --  0.05  --  0.04   LYMPHS ABS  --   --  2.42  --  2.25   MONOS ABS  --   --  0.87  --  0.93*   EOS ABS  --   --  0.14  --  0.13   MCV 90.5 93.2 93.5  --  92.1   CRP  --   --   --   --  1.30*   PROCALCITONIN  --   --   --   --  0.04   LACTATE  --   --   --  1.9 1.3         Lab 03/12/25  1706 03/12/25  0408 03/11/25  0545 03/10/25  1447 03/10/25  0430 03/09/25  1014   SODIUM  --  137 140  --  142 140   POTASSIUM 4.2 3.6 3.9 4.2 3.6 4.1   CHLORIDE  --  98 96*  --  100 101   CO2  --  30.0* 31.0*  --  30.0* 28.0   ANION GAP  --  9.0 13.0  --  12.0 11.0   BUN  --  14 12  --  10 10   CREATININE  --  0.88 1.16*  --  1.09* 1.05*   EGFR  --  68.6 49.3*  --  53.1* 55.5*   GLUCOSE  --  85 101*  --  93 98   CALCIUM  --  8.8 9.2  --  8.7 9.0   MAGNESIUM  --   --   --   --  1.9  --    TSH  --   --   --   --  4.510*  --          Lab 03/10/25  0430 03/09/25  1014   TOTAL PROTEIN 6.2 6.9   ALBUMIN 3.5 3.5   GLOBULIN 2.7 3.4   ALT (SGPT) 9 9   AST (SGOT) 16 17   BILIRUBIN 0.4 0.5   ALK PHOS 96 94         Lab 03/09/25  1254 03/09/25  1122 03/09/25  1014   PROBNP  --   --  132.9   HSTROP T 13 17* 12                 Brief Urine Lab Results  (Last result in the past 365 days)        Color    Clarity   Blood   Leuk Est   Nitrite   Protein   CREAT   Urine HCG        03/10/25 0051             16.6         03/10/25 0051 Yellow   Clear   Negative   Negative   Negative   Negative                 Microbiology Results (last 10 days)       Procedure Component Value - Date/Time    MRSA Screen, PCR (Inpatient) - Swab, Nares [[id number]]  (Normal) Collected: 03/10/25 0052    Lab Status: Final result Specimen: Swab from Nares Updated: 03/10/25 0753     MRSA PCR Negative    Narrative:      The negative predictive value of this diagnostic test is high and should only be used to consider de-escalating anti-MRSA therapy. A positive result may indicate colonization with MRSA and must be correlated clinically.  MRSA Negative    Blood Culture - Blood, Arm, Right [[id number]]  (Normal) Collected: 03/09/25 1535    Lab Status: Final result Specimen: Blood from Arm, Right Updated: 03/14/25 1700     Blood Culture No growth at 5 days    Narrative:      Less than seven (7) mL's of blood was collected.  Insufficient quantity may yield false negative results.    Blood Culture - Blood, Arm, Left [[id number]]  (Normal) Collected: 03/09/25 1535    Lab Status: Final result Specimen: Blood from Arm, Left Updated: 03/14/25 1700     Blood Culture No growth at 5 days    Narrative:      Less than seven (7) mL's of blood was collected.  Insufficient quantity may yield false negative results.            Duplex Venous Lower Extremity - Left  Result Date: 3/9/2025    No evidence of deep or superficial venous thrombus in the left lower extremity.  The calf veins were difficult to visualize but no apparent thrombus.     XR Chest 1 View  Result Date: 3/9/2025  XR CHEST 1 VW Date of Exam: 3/9/2025 10:30 AM EDT Indication: cough Comparison: Low-dose chest CT [address] 23 Findings: Examination limited by body habitus. Cardiomediastinal silhouette is unremarkable. There is been a sternotomy. There is generalized interstitial prominence, similar to previous  CT given differences in modality. No airspace disease, pneumothorax, nor pleural effusion. No acute osseous abnormality identified.     Impression: 1.No acute process identified Electronically Signed: Dwayne Shaw MD  3/9/2025 10:38 AM EDT  Workstation ID: [id number]      Results for orders placed during the hospital encounter of 03/09/25    Duplex Venous Lower Extremity - Left    Interpretation Summary    No evidence of deep or superficial venous thrombus in the left lower extremity.  The calf veins were difficult to visualize but no apparent thrombus.      Results for orders placed during the hospital encounter of 03/09/25    Duplex Venous Lower Extremity - Left    Interpretation Summary    No evidence of deep or superficial venous thrombus in the left lower extremity.  The calf veins were difficult to visualize but no apparent thrombus.      Results for orders placed during the hospital encounter of 03/09/25    Adult Transthoracic Echo Complete W/ Cont if Necessary Per Protocol    Interpretation Summary    Very poor acoustic windows.  All cardiac structures poorly visualized.    Left ventricular systolic function is normal. Calculated left ventricular EF = 66.7% Left ventricular ejection fraction appears to be 66 - 70%.    The right ventricular cavity is mildly dilated with normal systolic function by visual estimation.    Mild aortic regurgitation is present.      Plan for Follow-up of Pending Labs/Results:     Discharge Details        Discharge Medications        Changes to Medications        Instructions Start Date   bumetanide 1 MG tablet  Commonly known as: BUMEX  What changed:   how much to take  how to take this  when to take this  additional instructions   1 mg, Oral, Daily      bumetanide 1 MG tablet  Commonly known as: BUMEX  What changed: You were already taking a medication with the same name, and this prescription was added. Make sure you understand how and when to take each.   1 mg, Oral, Daily PRN              Continue These Medications        Instructions Start Date   albuterol sulfate  (90 Base) MCG/ACT inhaler  Commonly known as: PROVENTIL HFA;VENTOLIN HFA;PROAIR HFA   2 puffs, Every 4 Hours PRN      allopurinol 100 MG tablet  Commonly known as: ZYLOPRIM   200 mg, Oral, Daily      cholecalciferol 25 MCG (1000 UT) tablet  Commonly known as: VITAMIN D3   1,000 Units, Daily      hydrOXYzine pamoate 25 MG capsule  Commonly known as: VISTARIL   TAKE 1 TO 2 CAPSULES NIGHTLY AS NEEDED      levocetirizine 5 MG tablet  Commonly known as: XYZAL   5 mg, Oral, Every Evening      levothyroxine 100 MCG tablet  Commonly known as: SYNTHROID, LEVOTHROID   100 mcg, Oral, Daily      magnesium oxide 400 MG tablet  Commonly known as: MAG-OX   400 mg, Oral, Daily      potassium chloride ER 20 MEQ tablet controlled-release ER tablet  Commonly known as: K-TAB   20 mEq, Oral, Daily      rosuvastatin 20 MG tablet  Commonly known as: CRESTOR   20 mg, Oral, Daily      Spiriva Respimat 2.5 MCG/ACT aerosol solution inhaler  Generic drug: tiotropium bromide monohydrate   2 puffs, Inhalation, Daily - RT      vitamin B-12 100 MCG tablet  Commonly known as: CYANOCOBALAMIN   100 mcg, Oral, 2 Times Weekly      vitamin C 250 MG tablet  Commonly known as: ASCORBIC ACID   250 mg, Daily               No Known Allergies      Discharge Disposition:  Skilled Nursing Facility (DC - External)    Diet:  Hospital:  Diet Order   Procedures    Diet: Cardiac; Healthy Heart (2-3 Na+); Fluid Consistency: Thin (IDDSI 0)       Diet Instructions       Diet: Cardiac Diets; Low Sodium (2g); Regular (IDDSI 7); Thin (IDDSI 0)      Discharge Diet: Cardiac Diets    Cardiac Diet: Low Sodium (2g)    Texture: Regular (IDDSI 7)    Fluid Consistency: Thin (IDDSI 0)             Activity:      Restrictions or Other Recommendations:         CODE STATUS:    Code Status and Medical Interventions: No CPR (Do Not Attempt to Resuscitate); Limited Support; No intubation (DNI)    Ordered at: 03/10/25 1203     Code Status (Patient has no pulse and is not breathing):    No CPR (Do Not Attempt to Resuscitate)     Medical Interventions (Patient has pulse or is breathing):    Limited Support     Medical Intervention Limits:    No intubation (DNI)     Level Of Support Discussed With:    Patient       Future Appointments   Date Time Provider Department Center   4/18/2025  9:00 AM ALEX [facility] ECH/VAS CRT8 BH ALEX  ALEX   4/22/2025  9:30 AM Monisha Mandel APRN [facility] CTS ALEX ALEX   2/12/2026  9:15 AM Ethel Denise APRN [facility] ALEX                 Katherine Bynum MD  03/15/25      Time Spent on Discharge:  I spent  40  minutes on this discharge activity which included: face-to-face encounter with the patient, reviewing the data in the system, coordination of the care with the nursing staff as well as consultants, documentation, and entering orders.

## 2025-03-15 NOTE — PLAN OF CARE
Goal Outcome Evaluation:        Problem: Adult Inpatient Plan of Care  Goal: Plan of Care Review  Outcome: Progressing  Goal: Patient-Specific Goal (Individualized)  Outcome: Progressing  Goal: Absence of Hospital-Acquired Illness or Injury  Outcome: Progressing  Intervention: Identify and Manage Fall Risk  Recent Flowsheet Documentation  Taken 3/15/2025 1000 by Debora Leos RN  Safety Promotion/Fall Prevention:   activity supervised   assistive device/personal items within reach   clutter free environment maintained   fall prevention program maintained   nonskid shoes/slippers when out of bed   safety round/check completed   room organization consistent   toileting scheduled  Intervention: Prevent Skin Injury  Recent Flowsheet Documentation  Taken 3/15/2025 1000 by Debora Leos RN  Body Position:   weight shifting   position changed independently  Intervention: Prevent and Manage VTE (Venous Thromboembolism) Risk  Recent Flowsheet Documentation  Taken 3/15/2025 1000 by Debora Leos RN  VTE Prevention/Management: (see Emar) other (see comments)  Goal: Optimal Comfort and Wellbeing  Outcome: Progressing  Intervention: Monitor Pain and Promote Comfort  Recent Flowsheet Documentation  Taken 3/15/2025 1000 by Debora Leos RN  Pain Management Interventions:   care clustered   quiet environment facilitated  Intervention: Provide Person-Centered Care  Recent Flowsheet Documentation  Taken 3/15/2025 1000 by Debora Leos RN  Trust Relationship/Rapport:   care explained   choices provided  Goal: Readiness for Transition of Care  Outcome: Progressing

## 2025-03-15 NOTE — THERAPY WOUND CARE TREATMENT
Acute Care - Wound/Debridement Treatment Note  [facility]     Patient Name: Romel Wilson  : 1950  MRN: [id number]  Today's Date: 3/15/2025                Admit Date: 3/9/2025    Visit Dx:    ICD-10-CM ICD-9-CM   1. Pain of left lower extremity  M79.605 729.5   2. Cellulitis of left lower extremity  L03.116 682.6       Patient Active Problem List   Diagnosis    Obstructive sleep apnea, adult    Morbid obesity due to excess calories    Hypothyroidism (acquired)    Lymphedema    Chronic hypotension        Past Medical History:   Diagnosis Date    Arthritis     Disease of thyroid gland     Gout         Past Surgical History:   Procedure Laterality Date    HYSTERECTOMY      THYROIDECTOMY                 Lymphedema       Row Name 03/15/25 1100             Lymphedema Edema Assessment    Ptting Edema Category By severity  -      Pitting Edema Mild  -         Skin Changes/Observations    Location/Assessment Lower Extremity  -      Lower Extremity Conditions bilateral:;dry;clean  -LH      Lower Extremity Color/Pigment bilateral:;hyperpigmented  -         Lymphedema Pulses/Capillary Refill    Lymphedema Pulses/Capillary Refill lower extremity pulses;capillary refill  -      Dorsalis Pedis Pulse right:;left:;+1 diminished  -      Capillary Refill lower extremity capillary refill  -      Lower Extremity Capillary Refill left:;right:;less than 3 seconds  -         Lymphedema Measurements    Measurement Type(s) Quick Girth  -LH      Quick Girth Areas Lower extremities  -         LLE Quick Girth (cm)    Mid foot 28 cm  -LH      Smallest ankle 31.8 cm  -LH      Largest calf 54 cm  -LH         RLE Quick Girth (cm)    Mid foot 28 cm  -LH      Smallest ankle 29.8 cm  -LH      Largest calf 53 cm  -LH      RLE Quick Girth Total 110.8  -LH         Compression/Skin Care    Compression/Skin Care skin care;wrapping location;bandaging  -      Skin Care washed/dried;lotion applied;moisturizing lotion applied   -      Wrapping Location lower extremity  -      Wrapping Location LE bilateral:;foot to knee  -      Wrapping Comments BLE size short, XL Circaid Juxtalite velcro closure compression system.  -                User Key  (r) = Recorded By, (t) = Taken By, (c) = Cosigned By      Initials Name Provider Type     Vinod Moore, PT Physical Therapist                    WOUND DEBRIDEMENT                     PT Assessment (Last 12 Hours)       PT Evaluation and Treatment       Row Name 03/15/25 1109          Physical Therapy Time and Intention    Subjective Information complains of;weakness;swelling  -     Document Type therapy note (daily note);wound care  -     Mode of Treatment physical therapy;individual therapy  -       Row Name 03/15/25 1109          General Information    Patient Observations alert;cooperative;agree to therapy  -       Row Name 03/15/25 1109          Pain    Pretreatment Pain Rating 0/10 - no pain  -     Posttreatment Pain Rating 0/10 - no pain  -       Row Name 03/15/25 1109          Cognition    Affect/Mental Status (Cognition) WFL  -     Orientation Status (Cognition) oriented x 4  -       Row Name 03/15/25 1109          Coping    Observed Emotional State calm;cooperative;pleasant  -     Verbalized Emotional State acceptance  -     Trust Relationship/Rapport care explained;questions answered  -     Family/Support Persons son  -     Involvement in Care at bedside;interacting with patient  -       Row Name 03/15/25 1109          Plan of Care Review    Plan of Care Reviewed With patient;son  -     Progress no change  -     Outcome Evaluation Pt with no compression applied to BLEs upon entering room as pt reports her BLEs became numb with previous use of compressogrips, so she had them removed. PT educated pt on proper donning/doffing of compression as well as proper wear schedule for optimal benefits. PT fitted and applied pt with size short, XL Circaid  Juxtalite velcro closure compression system to help further promote venous return, maintain limb girth, and improve skin integrity. Pt due for next compression garment change in 2-3 days, pt may remove compression garments PRN based on comfort.  -       Row Name 03/15/25 1109          Positioning and Restraints    Pre-Treatment Position sitting in chair/recliner  -     Post Treatment Position chair  -LH     In Chair sitting;call light within reach;encouraged to call for assist  -               User Key  (r) = Recorded By, (t) = Taken By, (c) = Cosigned By      Initials Name Provider Type     Vinod Moore, PT Physical Therapist                  Physical Therapy Education       Title: PT OT SLP Therapies (Resolved)       Topic: Physical Therapy (Resolved)       Point: Mobility training (Resolved)       Learning Progress Summary            Patient Acceptance, E, NR by AE at 3/13/2025 1018                      Point: Home exercise program (Resolved)       Learner Progress:  Not documented in this visit.              Point: Body mechanics (Resolved)       Learning Progress Summary            Patient Acceptance, E, NR by AE at 3/13/2025 1018                      Point: Precautions (Resolved)       Learning Progress Summary            Patient Acceptance, E, NR by AE at 3/13/2025 1018                                      User Key       Initials Effective Dates Name Provider Type Discipline    AE 09/21/21 -  Calderon Beard, ANNEMARIE Physical Therapist PT                    Recommendation and Plan  Anticipated Discharge Disposition (PT): inpatient rehabilitation facility  Planned Therapy Interventions (PT): wound care  Therapy Frequency (PT): daily  Plan of Care Reviewed With: patient, son   Progress: no change       Progress: no change  Outcome Evaluation: Pt with no compression applied to BLEs upon entering room as pt reports her BLEs became numb with previous use of compressogrips, so she had them removed. PT  educated pt on proper donning/doffing of compression as well as proper wear schedule for optimal benefits. PT fitted and applied pt with size short, XL Circaid Juxtalite velcro closure compression system to help further promote venous return, maintain limb girth, and improve skin integrity. Pt due for next compression garment change in 2-3 days, pt may remove compression garments PRN based on comfort.  Plan of Care Reviewed With: patient, son            Time Calculation   PT Charges       Row Name 03/15/25 1201             Time Calculation    Start Time 1109  -LH      PT Goal Re-Cert Due Date 03/20/25  -         Timed Charges    24264 - PT Self Care/Mgmt Minutes 35  -LH         Total Minutes    Timed Charges Total Minutes 35  -LH       Total Minutes 35  -LH                User Key  (r) = Recorded By, (t) = Taken By, (c) = Cosigned By      Initials Name Provider Type     Vinod Moore, PT Physical Therapist                      Therapy Charges for Today       Code Description Service Date Service Provider Modifiers Qty    [id number] HC PT SELF CARE/MGMT/TRAIN EA 15 MIN 3/15/2025 Vinod Moore, PT GP 2              PT G-Codes  Outcome Measure Options: AM-PAC 6 Clicks Daily Activity (OT)  AM-PAC 6 Clicks Score (PT): 13  AM-PAC 6 Clicks Score (OT): 18       Vinod Moore PT  3/15/2025

## 2025-03-15 NOTE — PLAN OF CARE
Goal Outcome Evaluation:  Plan of Care Reviewed With: patient, son        Progress: no change  Outcome Evaluation: Pt with no compression applied to BLEs upon entering room as pt reports her BLEs became numb with previous use of compressogrips, so she had them removed. PT educated pt on proper donning/doffing of compression as well as proper wear schedule for optimal benefits. PT fitted and applied pt with size short, XL Circaid Juxtalite velcro closure compression system to help further promote venous return, maintain limb girth, and improve skin integrity. Pt due for next compression garment change in 2-3 days, pt may remove compression garments PRN based on comfort.

## 2025-04-18 ENCOUNTER — HOSPITAL ENCOUNTER (OUTPATIENT)
Dept: CARDIOLOGY | Facility: HOSPITAL | Age: 75
Discharge: HOME | End: 2025-04-18
Payer: MEDICARE

## 2025-04-18 VITALS — BODY MASS INDEX: 48.82 KG/M2 | WEIGHT: 293 LBS | HEIGHT: 65 IN

## 2025-04-18 DIAGNOSIS — I87.2 CHRONIC VENOUS INSUFFICIENCY: ICD-10-CM

## 2025-04-18 DIAGNOSIS — I73.9 PVD (PERIPHERAL VASCULAR DISEASE): ICD-10-CM

## 2025-04-18 DIAGNOSIS — R60.0 PERIPHERAL EDEMA: ICD-10-CM

## 2025-04-18 LAB
BH CV LOWER VASCULAR LEFT COMMON FEMORAL AUGMENT: NORMAL
BH CV LOWER VASCULAR LEFT COMMON FEMORAL COMPRESS: NORMAL
BH CV LOWER VASCULAR LEFT COMMON FEMORAL PHASIC: NORMAL
BH CV LOWER VASCULAR LEFT COMMON FEMORAL SPONT: NORMAL
BH CV LOWER VASCULAR LEFT DISTAL FEMORAL COMPRESS: NORMAL
BH CV LOWER VASCULAR LEFT GASTRONEMIUS COMPRESS: NORMAL
BH CV LOWER VASCULAR LEFT GREATER SAPH AK COMPRESS: NORMAL
BH CV LOWER VASCULAR LEFT GREATER SAPH BK COMPRESS: NORMAL
BH CV LOWER VASCULAR LEFT LESSER SAPH COMPRESS: NORMAL
BH CV LOWER VASCULAR LEFT MID FEMORAL AUGMENT: NORMAL
BH CV LOWER VASCULAR LEFT MID FEMORAL COMPRESS: NORMAL
BH CV LOWER VASCULAR LEFT MID FEMORAL PHASIC: NORMAL
BH CV LOWER VASCULAR LEFT MID FEMORAL SPONT: NORMAL
BH CV LOWER VASCULAR LEFT POPLITEAL AUGMENT: NORMAL
BH CV LOWER VASCULAR LEFT POPLITEAL COMPETENT: NORMAL
BH CV LOWER VASCULAR LEFT POPLITEAL COMPRESS: NORMAL
BH CV LOWER VASCULAR LEFT POPLITEAL PHASIC: NORMAL
BH CV LOWER VASCULAR LEFT POPLITEAL SPONT: NORMAL
BH CV LOWER VASCULAR LEFT POSTERIOR TIBIAL COMPRESS: NORMAL
BH CV LOWER VASCULAR LEFT PROFUNDA FEMORAL COMPRESS: NORMAL
BH CV LOWER VASCULAR LEFT PROXIMAL FEMORAL COMPRESS: NORMAL
BH CV LOWER VASCULAR LEFT SAPHENOFEMORAL JUNCTION COMPRESS: NORMAL
BH CV LOWER VASCULAR RIGHT COMMON FEMORAL AUGMENT: NORMAL
BH CV LOWER VASCULAR RIGHT COMMON FEMORAL COMPETENT: NORMAL
BH CV LOWER VASCULAR RIGHT COMMON FEMORAL PHASIC: NORMAL
BH CV LOWER VASCULAR RIGHT COMMON FEMORAL SPONT: NORMAL

## 2025-04-18 PROCEDURE — 93971 EXTREMITY STUDY: CPT

## 2025-04-21 NOTE — PROGRESS NOTES
"     [facility] Cardiothoracic Surgery New Patient Office Note     Date of Encounter: 2025     Name: Romel Wilson  : 1950     Referred By: Hafsa Plummer MD  PCP: Hafsa Plummer MD    Chief Complaint:    Chief Complaint   Patient presents with    Peripheral Vascular Disease    Edema     Np referred for peripheral vascular disease and edema,complains of legs swelling.       Subjective      History of Present Illness:    Romel Wilson is a 75 y.o. female referred to Dr. Ruelas per PCP, Dr. Plummer, for PVD/venous insufficiency.  PMH:  active tobacco use, NANCY, Gout, thyroid disease, morbid obesity (BMI 53.63 kg/m2), and chronic lower extremity edema with recent cellulitis requiring hospitalization 3/9/2025.  DC'd to Skilled Nursing Facility 3/15/25.  Currently having home health nursing and home PT.  Has tried compression stockings and ACE wraps.  Cellulitis 3/2025 has been the only incident of soft tissue infection thus far.  Reports \"my legs are just big\". Reports chronic LE edema beginning approximately 25 years ago.  Limited ambulation except inside her home for basic independent ADL's.  Has not been out to [employer] or other outings since 2024.  When she does walk any distance, she reports pain in BLE proximal posterior lower legs.  No hx of injury or DVT's.      Review of Systems:  Review of Systems   Constitutional: Positive for malaise/fatigue and weight loss. Negative for chills, decreased appetite, diaphoresis, fever and night sweats.   HENT:  Positive for hoarse voice. Negative for congestion and sore throat.    Cardiovascular:  Positive for dyspnea on exertion and leg swelling. Negative for chest pain, irregular heartbeat, near-syncope, orthopnea, palpitations, paroxysmal nocturnal dyspnea and syncope.   Respiratory:  Positive for cough and sleep disturbances due to breathing. Negative for hemoptysis, snoring, sputum production and wheezing.    Hematologic/Lymphatic: Negative.  " Negative for adenopathy and bleeding problem. Does not bruise/bleed easily.   Skin:  Positive for color change. Negative for dry skin, itching, poor wound healing and rash.   Musculoskeletal: Negative.  Negative for falls and muscle weakness.   Gastrointestinal: Negative.  Negative for abdominal pain, anorexia, constipation, diarrhea, nausea and vomiting.   Genitourinary: Negative.  Negative for dysuria and frequency.   Neurological:  Positive for headaches. Negative for difficulty with concentration, dizziness, numbness, paresthesias, seizures and weakness.   Psychiatric/Behavioral: Negative.  Negative for altered mental status, depression and memory loss. The patient does not have insomnia and is not nervous/anxious.    Allergic/Immunologic: Negative.  Negative for persistent infections.       I have reviewed the following portions of the patient's history: problem list, current medications, allergies, past surgical history, past medical history, past social history, past family history, and ROS and confirm it's accurate.    Allergies:  No Known Allergies    Medications:      Current Outpatient Medications:     albuterol sulfate  (90 Base) MCG/ACT inhaler, Inhale 2 puffs Every 4 (Four) Hours As Needed for Wheezing., Disp: , Rfl:     allopurinol (ZYLOPRIM) 100 MG tablet, Take 2 tablets by mouth Daily., Disp: , Rfl:     bumetanide (BUMEX) 1 MG tablet, Take 1 tablet by mouth Daily., Disp: , Rfl:     bumetanide (BUMEX) 1 MG tablet, Take 1 tablet by mouth Daily As Needed (3 lb weight gain over 1 day of 5 lb off baseline weight)., Disp: , Rfl:     cholecalciferol (VITAMIN D3) 25 MCG (1000 UT) tablet, Take 1 tablet by mouth Daily., Disp: , Rfl:     hydrOXYzine pamoate (VISTARIL) 25 MG capsule, TAKE 1 TO 2 CAPSULES NIGHTLY AS NEEDED, Disp: 60 capsule, Rfl: 11    levocetirizine (XYZAL) 5 MG tablet, Take 1 tablet by mouth Every Evening., Disp: , Rfl:     levothyroxine (SYNTHROID, LEVOTHROID) 100 MCG tablet, Take 1  "tablet by mouth Daily., Disp: , Rfl:     magnesium oxide (MAG-OX) 400 MG tablet, Take 1 tablet by mouth Daily., Disp: , Rfl:     potassium chloride ER (K-TAB) 20 MEQ tablet controlled-release ER tablet, Take 1 tablet by mouth Daily., Disp: , Rfl:     rosuvastatin (CRESTOR) 20 MG tablet, Take 1 tablet by mouth Daily., Disp: , Rfl:     tiotropium bromide monohydrate (Spiriva Respimat) 2.5 MCG/ACT aerosol solution inhaler, Inhale 2 puffs Daily., Disp: , Rfl:     vitamin B-12 (CYANOCOBALAMIN) 100 MCG tablet, Take 1 tablet by mouth 2 (Two) Times a Week., Disp: , Rfl:     vitamin C (ASCORBIC ACID) 250 MG tablet, Take 1 tablet by mouth Daily., Disp: , Rfl:     History:   Past Medical History:   Diagnosis Date    Arthritis     Disease of thyroid gland     GERD (gastroesophageal reflux disease)     Gout     Hyperlipidemia     Sleep apnea        Past Surgical History:   Procedure Laterality Date    HYSTERECTOMY      THYROIDECTOMY         Social History     Socioeconomic History    Marital status: Single    Number of children: 2   Tobacco Use    Smoking status: Every Day     Current packs/day: 0.50     Types: Cigarettes    Smokeless tobacco: Never   Vaping Use    Vaping status: Never Used   Substance and Sexual Activity    Alcohol use: No     Comment: once a year    Drug use: No        Family History   Problem Relation Age of Onset    Hypertension Mother        Objective   Physical Exam:  Vitals:    04/22/25 0913   BP: 99/67   BP Location: Right arm   Patient Position: Sitting   Pulse: 66   Temp: 97.9 °F (36.6 °C)   SpO2: 94%   Weight: (!) 142 kg (314 lb)   Height: 165.1 cm (65\")  Comment: patient reports      Body mass index is 52.25 kg/m².    Physical Exam  Vitals reviewed.   Constitutional:       General: She is not in acute distress.     Appearance: She is not toxic-appearing.   HENT:      Head: Normocephalic and atraumatic.   Eyes:      General: Lids are normal.      Conjunctiva/sclera: Conjunctivae normal.      Pupils: " Pupils are equal, round, and reactive to light.   Neck:      Vascular: No carotid bruit.   Cardiovascular:      Rate and Rhythm: Normal rate and regular rhythm.      Heart sounds: S1 normal and S2 normal. No murmur heard.  Pulmonary:      Effort: Pulmonary effort is normal. No respiratory distress.      Breath sounds: No decreased breath sounds, wheezing, rhonchi or rales.   Musculoskeletal:         General: Normal range of motion.      Cervical back: Normal range of motion and neck supple.      Right lower leg: No edema.      Left lower leg: No edema.   Lymphadenopathy:      Cervical: No cervical adenopathy.      Upper Body:      Right upper body: No supraclavicular adenopathy.      Left upper body: No supraclavicular adenopathy.   Skin:     General: Skin is warm and dry.      Capillary Refill: Capillary refill takes less than 2 seconds.   Neurological:      General: No focal deficit present.      Mental Status: She is alert and oriented to person, place, and time.   Psychiatric:         Attention and Perception: Attention normal.         Mood and Affect: Mood normal.         Speech: Speech normal.         Behavior: Behavior normal. Behavior is cooperative.         Imaging/Labs:  Venous Duplex 4/18/25 Interpretation Summary     Normal left lower extremity venous duplex scan.     Venous Duplex 3/9/25 Interpretation Summary     No evidence of deep or superficial venous thrombus in the left lower extremity.  The calf veins were difficult to visualize but no apparent thrombus.    Assessment / Plan      Assessment / Plan:  1. Lymphedema (Primary)  2. Claudication of both lower extremities  - 75 y.o. female referred to Dr. Ruelas per PCP, Dr. Plummer, for PVD/venous insufficiency.  PMH:  active tobacco use, NANCY, gout, thyroid disease, morbid obesity (BMI 53.63 kg/m2), and chronic lower extremity edema with recent cellulitis requiring hospitalization 3/9/2025.   - DC'd to Skilled Nursing Facility 3/15/25.   - Current tx:   home health nursing and home PT.    - Reports pain in BLE proximal posterior lower legs.   - Venous duplex 3/9/2025 and 4/18/2025 WNL  - No arterial studies.  Will check arterial duplex and have patient return to Vascular Surgery as she is at risk for arterial disease r/t tobacco use and dyslipidemia.      Patient Education: Romel Wilson  reports that she has been smoking cigarettes. She has never used smokeless tobacco. I have educated her on the risk of diseases from using tobacco products such as cancer, COPD, heart disease, and arterial disease. I advised her to quit and she is not willing to quit. I spent 3.5 minutes counseling the patient.       Follow Up:   Return in about 4 weeks (around 5/20/2025) for BLE arterial duplex.   Or sooner for any further concerns or worsening sign and symptoms. If unable to reach us in the office please dial 911 or go to the nearest emergency department.      KATELYNN Ewing  [facility] Cardiothoracic Surgery    Time Spent: I spent 30 minutes caring for Romel on this date of service. This time includes time spent by me in the following activities: preparing for the visit, reviewing tests, obtaining and/or reviewing a separately obtained history, performing a medically appropriate examination and/or evaluation, counseling and educating the patient/family/caregiver, ordering medications, tests, or procedures, documenting information in the medical record, and care coordination.

## 2025-04-22 ENCOUNTER — OFFICE VISIT (OUTPATIENT)
Dept: CARDIAC SURGERY | Facility: CLINIC | Age: 75
End: 2025-04-22
Payer: MEDICARE

## 2025-04-22 VITALS
DIASTOLIC BLOOD PRESSURE: 67 MMHG | SYSTOLIC BLOOD PRESSURE: 99 MMHG | BODY MASS INDEX: 48.82 KG/M2 | TEMPERATURE: 97.9 F | OXYGEN SATURATION: 94 % | HEART RATE: 66 BPM | HEIGHT: 65 IN | WEIGHT: 293 LBS

## 2025-04-22 DIAGNOSIS — I89.0 LYMPHEDEMA: Primary | ICD-10-CM

## 2025-04-22 DIAGNOSIS — I73.9 CLAUDICATION OF BOTH LOWER EXTREMITIES: Primary | ICD-10-CM

## 2025-04-22 DIAGNOSIS — I73.9 CLAUDICATION OF BOTH LOWER EXTREMITIES: ICD-10-CM

## 2025-04-22 PROCEDURE — 99203 OFFICE O/P NEW LOW 30 MIN: CPT | Performed by: NURSE PRACTITIONER

## 2025-04-25 ENCOUNTER — PATIENT ROUNDING (BHMG ONLY) (OUTPATIENT)
Dept: CARDIAC SURGERY | Facility: CLINIC | Age: 75
End: 2025-04-25

## 2025-04-25 NOTE — PROGRESS NOTES
April 25, 2025    Hello, may I speak with Romel Wilson?    My name is Christina      I am  with [facility] CARDIOTHORACIC SURGERY  [address]  [facility] [address] [phone].    Before we get started may I verify your date of birth? 1950    I am calling to officially welcome you to our practice and ask about your recent visit. Is this a good time to talk? YES    Tell me about your visit with us. What things went well?  WENT GOOD        We're always looking for ways to make our patients' experiences even better. Do you have recommendations on ways we may improve?  NO    Overall were you satisfied with your first visit to our practice? YES       I appreciate you taking the time to speak with me today. Is there anything else I can do for you? NO      Thank you, and have a great day.

## 2025-05-16 ENCOUNTER — HOSPITAL ENCOUNTER (OUTPATIENT)
Dept: CARDIOLOGY | Facility: HOSPITAL | Age: 75
Discharge: HOME | End: 2025-05-16
Payer: MEDICARE

## 2025-05-16 VITALS — BODY MASS INDEX: 48.82 KG/M2 | HEIGHT: 65 IN | WEIGHT: 293 LBS

## 2025-05-16 DIAGNOSIS — I73.9 CLAUDICATION OF BOTH LOWER EXTREMITIES: ICD-10-CM

## 2025-05-16 LAB
BH CV GRAFT BRACHIAL PRESSURE LEFT: 124 MMHG
BH CV GRAFT BRACHIAL PRESSURE RIGHT: 101 MMHG
BH CV LEA LEFT ANT TIBIAL A DISTAL PSV: 66.26 CM/S
BH CV LEA LEFT CFA DISTAL EDV: 10.58 CM/S
BH CV LEA LEFT CFA DISTAL PSV: 66.64 CM/S
BH CV LEA LEFT DFA PROX PSV: 51.51 CM/S
BH CV LEA LEFT DPA PRESSURE: 140 MMHG
BH CV LEA LEFT POPITEAL A  DISTAL PSV: 84.11 CM/S
BH CV LEA LEFT POPITEAL A  PROX PSV: 63.78 CM/S
BH CV LEA LEFT PTA DISTAL PSV: 126.85 CM/S
BH CV LEA LEFT PTA MID PSV: 112.06 CM/S
BH CV LEA LEFT PTA PRESSURE: 144 MMHG
BH CV LEA LEFT PTA PROX PSV: 98.4 CM/S
BH CV LEA LEFT SFA DISTAL PSV: 58.96 CM/S
BH CV LEA LEFT SFA MID PSV: 73.31 CM/S
BH CV LEA LEFT SFA PROX PSV: 85.68 CM/S
BH CV LEA LEFT TIBEOPERONEAL PSV: 110.79 CM/S
BH CV LEA RIGHT ANT TIBIAL A DISTAL PSV: 54.08 CM/S
BH CV LEA RIGHT CFA DISTAL EDV: 18.28 CM/S
BH CV LEA RIGHT CFA DISTAL PSV: 109.67 CM/S
BH CV LEA RIGHT DFA PROX PSV: 78.26 CM/S
BH CV LEA RIGHT DPA PRESSURE: 122 MMHG
BH CV LEA RIGHT POPITEAL A  DISTAL PSV: 85.57 CM/S
BH CV LEA RIGHT POPITEAL A  PROX PSV: 67.97 CM/S
BH CV LEA RIGHT PTA DISTAL PSV: 81.11 CM/S
BH CV LEA RIGHT PTA PRESSURE: 142 MMHG
BH CV LEA RIGHT SFA DISTAL PSV: 82.26 CM/S
BH CV LEA RIGHT SFA MID PSV: 92.54 CM/S
BH CV LEA RIGHT SFA PROX PSV: 103.96 CM/S
BH CV LEA RIGHT TIBEOPERONEAL PSV: 64.99 CM/S
BH CV LOWER ARTERIAL LEFT ABI RATIO: 1.16
BH CV LOWER ARTERIAL RIGHT ABI RATIO: 1.14

## 2025-05-16 PROCEDURE — 93925 LOWER EXTREMITY STUDY: CPT

## 2025-06-06 NOTE — PROGRESS NOTES
"     [facility] Cardiothoracic Surgery Office Follow Up Note     Date of Encounter: 2025     Name: Romel Wilson  : 1950     Referred By: No ref. provider found  PCP: Hafsa Plummer MD    Chief Complaint:    Chief Complaint   Patient presents with    Follow-up     Follow up with arterial duplex. Pt states that she is very faitgued, SOA with very little exertion, some swelling in the left lower leg and foot.        Subjective      History of Present Illness:    Romel Wilson is a 75 y.o. female followed by Vascular Surgery/Dr. Ruelas for PVD/venous insufficiency.  PMH:  active tobacco use, NANCY, Gout, thyroid disease, morbid obesity (BMI 53.63 kg/m2), and chronic lower extremity edema with recent cellulitis requiring hospitalization 3/9/2025.  [facility].  Recently completed home PT/OT.   Has tried compression stockings and ACE wraps.  Cellulitis 3/2025 has been the only incident of soft tissue infection thus far.  Reports \"my legs are just big\". Reports chronic LE edema beginning approximately 25 years ago.  Limited ambulation except inside her home for basic independent ADL's.  Has not been out to [employer] or other outings since 2024.  When she does walk any distance, she reports pain in BLE proximal posterior lower legs.  No hx of injury or DVT's.  Last seen in clinic for initial consultation 2025.  Returns w/ results of BLE arterial duplex/BONI testing to assess for peripheral arterial disease.    Review of Systems:  Review of Systems   Constitutional: Positive for malaise/fatigue. Negative for chills, decreased appetite and fever.   HENT:  Positive for congestion.    Cardiovascular:  Positive for dyspnea on exertion. Negative for chest pain, claudication, irregular heartbeat, leg swelling, near-syncope, orthopnea, palpitations and syncope.   Respiratory:  Positive for cough. Negative for hemoptysis, shortness of breath, sputum production and wheezing.  "   Hematologic/Lymphatic: Negative for bleeding problem. Does not bruise/bleed easily.   Skin:  Negative for color change, poor wound healing and rash.   Musculoskeletal:  Positive for arthritis (OA), back pain, joint pain and joint swelling. Negative for falls.   Gastrointestinal:  Positive for diarrhea (some). Negative for abdominal pain, constipation, nausea and vomiting.   Neurological:  Positive for numbness (at night in toes and finger tips). Negative for focal weakness and paresthesias.   Psychiatric/Behavioral:  Negative for depression. The patient does not have insomnia.    Allergic/Immunologic: Positive for environmental allergies.       I have reviewed the following portions of the patient's history: problem list, current medications, allergies, past surgical history, past medical history, past social history, past family history, and ROS and confirm it's accurate.    Allergies:  No Known Allergies    Medications:      Current Outpatient Medications:     albuterol sulfate  (90 Base) MCG/ACT inhaler, Inhale 2 puffs Every 4 (Four) Hours As Needed for Wheezing., Disp: , Rfl:     allopurinol (ZYLOPRIM) 100 MG tablet, Take 2 tablets by mouth Daily., Disp: , Rfl:     bumetanide (BUMEX) 1 MG tablet, Take 1 tablet by mouth Daily., Disp: , Rfl:     bumetanide (BUMEX) 1 MG tablet, Take 1 tablet by mouth Daily As Needed (3 lb weight gain over 1 day of 5 lb off baseline weight)., Disp: , Rfl:     cholecalciferol (VITAMIN D3) 25 MCG (1000 UT) tablet, Take 1 tablet by mouth Daily., Disp: , Rfl:     hydrOXYzine pamoate (VISTARIL) 25 MG capsule, TAKE 1 TO 2 CAPSULES NIGHTLY AS NEEDED, Disp: 60 capsule, Rfl: 11    levocetirizine (XYZAL) 5 MG tablet, Take 1 tablet by mouth Every Evening., Disp: , Rfl:     levothyroxine (SYNTHROID, LEVOTHROID) 100 MCG tablet, Take 1 tablet by mouth Daily., Disp: , Rfl:     magnesium oxide (MAG-OX) 400 MG tablet, Take 1 tablet by mouth Daily., Disp: , Rfl:     potassium chloride ER  "(K-TAB) 20 MEQ tablet controlled-release ER tablet, Take 1 tablet by mouth Daily., Disp: , Rfl:     rosuvastatin (CRESTOR) 20 MG tablet, Take 1 tablet by mouth Daily., Disp: , Rfl:     tiotropium bromide monohydrate (Spiriva Respimat) 2.5 MCG/ACT aerosol solution inhaler, Inhale 2 puffs Daily., Disp: , Rfl:     vitamin B-12 (CYANOCOBALAMIN) 100 MCG tablet, Take 1 tablet by mouth 2 (Two) Times a Week., Disp: , Rfl:     vitamin C (ASCORBIC ACID) 250 MG tablet, Take 1 tablet by mouth Daily., Disp: , Rfl:     History:   Past Medical History:   Diagnosis Date    Arthritis     Disease of thyroid gland     GERD (gastroesophageal reflux disease)     Gout     Hyperlipidemia     Sleep apnea        Past Surgical History:   Procedure Laterality Date    HYSTERECTOMY      THYROIDECTOMY         Social History     Socioeconomic History    Marital status: Single    Number of children: 2   Tobacco Use    Smoking status: Every Day     Current packs/day: 0.50     Types: Cigarettes    Smokeless tobacco: Never   Vaping Use    Vaping status: Never Used   Substance and Sexual Activity    Alcohol use: No     Comment: once a year    Drug use: No        Family History   Problem Relation Age of Onset    Hypertension Mother        Objective   Physical Exam:  Vitals:    06/09/25 1027 06/09/25 1031   BP: 108/72  Comment: left arm 104/68  Comment: rigth arm   Pulse: 67    Temp: 97.8 °F (36.6 °C)    SpO2: 94%    Weight: (!) 138 kg (304 lb)    Height: 165.1 cm (65\")  Comment: per pt       Body mass index is 50.59 kg/m².    Physical Exam  Vitals reviewed.   Constitutional:       General: She is not in acute distress.     Appearance: She is obese. She is not toxic-appearing.   HENT:      Head: Normocephalic and atraumatic.   Eyes:      General: Lids are normal.      Conjunctiva/sclera: Conjunctivae normal.      Pupils: Pupils are equal, round, and reactive to light.   Cardiovascular:      Rate and Rhythm: Normal rate and regular rhythm.      Pulses:   "         Dorsalis pedis pulses are 1+ on the right side and 1+ on the left side.        Posterior tibial pulses are 1+ on the right side.      Heart sounds: S1 normal and S2 normal. No murmur heard.     Comments: No LE wounds or ulcerations  Pulmonary:      Effort: Pulmonary effort is normal. No respiratory distress.      Breath sounds: Normal breath sounds.   Musculoskeletal:         General: Normal range of motion.      Cervical back: Normal range of motion and neck supple.      Right lower le+ Edema present.      Left lower le+ Edema present.   Skin:     General: Skin is warm and dry.      Capillary Refill: Capillary refill takes less than 2 seconds.   Neurological:      General: No focal deficit present.      Mental Status: She is alert and oriented to person, place, and time.   Psychiatric:         Attention and Perception: Attention normal.         Mood and Affect: Mood normal.         Speech: Speech normal.         Behavior: Behavior is cooperative.         Imaging/Labs:  Duplex Lower Extremity Art / Grafts - Bilateral 2025  Interpretation Summary    Right lower extremity arteries are patent and without significant stenosis. Left lower extremity arteries are patent and without significant stenosis.    No evidence of flow-limiting stenosis and normal bilateral ABIs at 1.1    Venous Duplex 25 Interpretation Summary     Normal left lower extremity venous duplex scan.     Venous Duplex 3/9/25 Interpretation Summary     No evidence of deep or superficial venous thrombus in the left lower extremity.  The calf veins were difficult to visualize but no apparent thrombus.    Assessment / Plan      Assessment / Plan:  1. Claudication of both lower extremities   2. Lymphedema  - 75 y.o. female followed by Vascular Surgery/Dr. Ruelas for PVD/venous insufficiency.    - PMH:  active tobacco use, NANCY, Gout, thyroid disease, morbid obesity (BMI 53.63 kg/m2), and chronic lower extremity edema w/ recent  "cellulitis requiring hospitalization 3/9/2025.   - Recently completed inpatient Rehab followed by home PT/OT  - Reports hx trial compression stockings and ACE wraps.    - Sx: \"my legs are just big\". Reports pain in BLE proximal posterior lower legs.  No hx of injury or DVT's.    - BLE arterial duplex/BONI study 5/16/2025:  normal BONI 1.1 bilaterally and no evidence of flow limiting arterial disease bilaterally.   - No need for follow up @ Vascular Surgery   - Refer patient back to PCP, Dr. Plummer, for consideration of lymphedema clinic referral or other treatment plan for chronic BLE edema.      Patient Education: Romel Wilson  reports that she has been smoking cigarettes. She has never used smokeless tobacco. I have educated her on the risk of diseases from using tobacco products such as cancer, COPD, heart disease, and arterial disease. I advised her to quit and she is not willing to quit. I spent 3  minutes counseling the patient.            Follow Up:   Return PRN per PCP or specialty request for new problem.   Or sooner for any further concerns or worsening sign and symptoms. If unable to reach us in the office please dial 911 or go to the nearest emergency department.      KATELYNN Ewing  [facility] Cardiothoracic Surgery    Time Spent: I spent 25 minutes caring for Romel on this date of service. This time includes time spent by me in the following activities: preparing for the visit, reviewing tests, obtaining and/or reviewing a separately obtained history, performing a medically appropriate examination and/or evaluation, counseling and educating the patient/family/caregiver, documenting information in the medical record, and care coordination.    "

## 2025-06-09 ENCOUNTER — OFFICE VISIT (OUTPATIENT)
Dept: CARDIAC SURGERY | Facility: CLINIC | Age: 75
End: 2025-06-09
Payer: MEDICARE

## 2025-06-09 VITALS
HEART RATE: 67 BPM | OXYGEN SATURATION: 94 % | SYSTOLIC BLOOD PRESSURE: 104 MMHG | TEMPERATURE: 97.8 F | HEIGHT: 65 IN | BODY MASS INDEX: 48.82 KG/M2 | DIASTOLIC BLOOD PRESSURE: 68 MMHG | WEIGHT: 293 LBS

## 2025-06-09 DIAGNOSIS — I89.0 LYMPHEDEMA: ICD-10-CM

## 2025-06-09 DIAGNOSIS — I73.9 CLAUDICATION OF BOTH LOWER EXTREMITIES: Primary | ICD-10-CM

## 2025-06-09 PROCEDURE — 99213 OFFICE O/P EST LOW 20 MIN: CPT | Performed by: NURSE PRACTITIONER

## 2025-06-09 PROCEDURE — 1160F RVW MEDS BY RX/DR IN RCRD: CPT | Performed by: NURSE PRACTITIONER

## 2025-06-09 PROCEDURE — 1159F MED LIST DOCD IN RCRD: CPT | Performed by: NURSE PRACTITIONER
